# Patient Record
Sex: FEMALE | Race: WHITE | Employment: UNEMPLOYED | URBAN - METROPOLITAN AREA
[De-identification: names, ages, dates, MRNs, and addresses within clinical notes are randomized per-mention and may not be internally consistent; named-entity substitution may affect disease eponyms.]

---

## 2017-03-13 ENCOUNTER — OFFICE VISIT (OUTPATIENT)
Dept: PRIMARY CARE CLINIC | Age: 67
End: 2017-03-13
Payer: MEDICARE

## 2017-03-13 VITALS
HEIGHT: 63 IN | DIASTOLIC BLOOD PRESSURE: 76 MMHG | RESPIRATION RATE: 16 BRPM | WEIGHT: 185 LBS | SYSTOLIC BLOOD PRESSURE: 124 MMHG | BODY MASS INDEX: 32.78 KG/M2 | HEART RATE: 76 BPM

## 2017-03-13 DIAGNOSIS — I10 ESSENTIAL HYPERTENSION: ICD-10-CM

## 2017-03-13 DIAGNOSIS — K21.9 GASTROESOPHAGEAL REFLUX DISEASE WITHOUT ESOPHAGITIS: ICD-10-CM

## 2017-03-13 DIAGNOSIS — Z00.00 ENCOUNTER FOR GENERAL ADULT MEDICAL EXAMINATION W/O ABNORMAL FINDINGS: Primary | ICD-10-CM

## 2017-03-13 DIAGNOSIS — Z51.81 SUBTHERAPEUTIC ANTICOAGULATION: ICD-10-CM

## 2017-03-13 DIAGNOSIS — F41.9 ANXIETY: ICD-10-CM

## 2017-03-13 DIAGNOSIS — G25.81 RESTLESS LEG SYNDROME: ICD-10-CM

## 2017-03-13 DIAGNOSIS — E78.2 MIXED HYPERLIPIDEMIA: ICD-10-CM

## 2017-03-13 DIAGNOSIS — Z79.01 SUBTHERAPEUTIC ANTICOAGULATION: ICD-10-CM

## 2017-03-13 DIAGNOSIS — I26.99 OTHER PULMONARY EMBOLISM WITHOUT ACUTE COR PULMONALE, UNSPECIFIED CHRONICITY (HCC): ICD-10-CM

## 2017-03-13 LAB
INR BLD: 2.7
PROTIME: 31 SECONDS

## 2017-03-13 PROCEDURE — 99215 OFFICE O/P EST HI 40 MIN: CPT | Performed by: NURSE PRACTITIONER

## 2017-03-13 RX ORDER — WARFARIN SODIUM 1 MG/1
TABLET ORAL
COMMUNITY
Start: 2017-01-24 | End: 2017-06-20 | Stop reason: ALTCHOICE

## 2017-03-13 RX ORDER — LISINOPRIL AND HYDROCHLOROTHIAZIDE 25; 20 MG/1; MG/1
1 TABLET ORAL DAILY
COMMUNITY
Start: 2017-02-22 | End: 2017-06-20 | Stop reason: SDUPTHER

## 2017-03-13 RX ORDER — OMEPRAZOLE 20 MG/1
20 CAPSULE, DELAYED RELEASE ORAL DAILY
COMMUNITY
Start: 2016-12-26 | End: 2017-04-11 | Stop reason: SDUPTHER

## 2017-03-13 RX ORDER — LORAZEPAM 0.5 MG/1
0.5 TABLET ORAL EVERY 6 HOURS PRN
COMMUNITY
Start: 2017-02-22 | End: 2017-03-29 | Stop reason: SDUPTHER

## 2017-03-13 RX ORDER — WARFARIN SODIUM 3 MG/1
TABLET ORAL
COMMUNITY
Start: 2017-01-22 | End: 2017-07-17 | Stop reason: SDUPTHER

## 2017-03-13 RX ORDER — ACETAMINOPHEN 500 MG
1000 TABLET ORAL EVERY 6 HOURS PRN
Status: ON HOLD | COMMUNITY
End: 2018-02-19 | Stop reason: HOSPADM

## 2017-03-13 RX ORDER — ATORVASTATIN CALCIUM 20 MG/1
20 TABLET, FILM COATED ORAL DAILY
COMMUNITY
Start: 2017-02-27 | End: 2017-04-28 | Stop reason: SDUPTHER

## 2017-03-13 RX ORDER — ESOMEPRAZOLE MAGNESIUM 20 MG/1
20 FOR SUSPENSION ORAL 2 TIMES DAILY
COMMUNITY
End: 2017-06-20 | Stop reason: ALTCHOICE

## 2017-03-13 RX ORDER — DIPHENHYDRAMINE HCL 25 MG
25 TABLET ORAL EVERY 6 HOURS PRN
COMMUNITY
End: 2018-03-12 | Stop reason: ALTCHOICE

## 2017-03-13 ASSESSMENT — ENCOUNTER SYMPTOMS
BLURRED VISION: 0
COUGH: 0
ABDOMINAL PAIN: 0
SHORTNESS OF BREATH: 0
BACK PAIN: 0
ORTHOPNEA: 0

## 2017-03-13 ASSESSMENT — PATIENT HEALTH QUESTIONNAIRE - PHQ9
1. LITTLE INTEREST OR PLEASURE IN DOING THINGS: 0
SUM OF ALL RESPONSES TO PHQ9 QUESTIONS 1 & 2: 0
2. FEELING DOWN, DEPRESSED OR HOPELESS: 0
SUM OF ALL RESPONSES TO PHQ QUESTIONS 1-9: 0

## 2017-03-15 ENCOUNTER — TELEPHONE (OUTPATIENT)
Dept: PRIMARY CARE CLINIC | Age: 67
End: 2017-03-15

## 2017-03-15 ENCOUNTER — ANTI-COAG VISIT (OUTPATIENT)
Dept: PRIMARY CARE CLINIC | Age: 67
End: 2017-03-15

## 2017-03-15 DIAGNOSIS — I26.99 OTHER PULMONARY EMBOLISM WITHOUT ACUTE COR PULMONALE, UNSPECIFIED CHRONICITY (HCC): Primary | ICD-10-CM

## 2017-03-20 DIAGNOSIS — I26.99 OTHER PULMONARY EMBOLISM WITHOUT ACUTE COR PULMONALE, UNSPECIFIED CHRONICITY (HCC): Primary | ICD-10-CM

## 2017-03-21 ENCOUNTER — ANTI-COAG VISIT (OUTPATIENT)
Dept: PRIMARY CARE CLINIC | Age: 67
End: 2017-03-21

## 2017-03-29 RX ORDER — LORAZEPAM 0.5 MG/1
0.5 TABLET ORAL EVERY 12 HOURS PRN
Qty: 60 TABLET | Refills: 0 | Status: SHIPPED | OUTPATIENT
Start: 2017-03-29 | End: 2017-04-26 | Stop reason: SDUPTHER

## 2017-04-03 ENCOUNTER — TELEPHONE (OUTPATIENT)
Dept: PRIMARY CARE CLINIC | Age: 67
End: 2017-04-03

## 2017-04-03 LAB
INR BLD: 3.2
INR BLD: 3.2
PROTIME: 35 SECONDS

## 2017-04-10 ENCOUNTER — TELEPHONE (OUTPATIENT)
Dept: PRIMARY CARE CLINIC | Age: 67
End: 2017-04-10

## 2017-04-10 LAB
INR BLD: 3.4
PROTIME: 37 SECONDS

## 2017-04-11 ENCOUNTER — ANTI-COAG VISIT (OUTPATIENT)
Dept: PRIMARY CARE CLINIC | Age: 67
End: 2017-04-11

## 2017-04-12 RX ORDER — OMEPRAZOLE 20 MG/1
20 CAPSULE, DELAYED RELEASE ORAL DAILY
Qty: 90 CAPSULE | Refills: 3 | Status: SHIPPED | OUTPATIENT
Start: 2017-04-12 | End: 2018-03-12 | Stop reason: ALTCHOICE

## 2017-04-24 ENCOUNTER — TELEPHONE (OUTPATIENT)
Dept: PRIMARY CARE CLINIC | Age: 67
End: 2017-04-24

## 2017-04-24 LAB
INR BLD: 2.9
PROTIME: 31.8 SECONDS

## 2017-04-26 RX ORDER — LORAZEPAM 0.5 MG/1
TABLET ORAL
Qty: 60 TABLET | Refills: 2 | Status: SHIPPED | OUTPATIENT
Start: 2017-04-26 | End: 2017-07-21 | Stop reason: SDUPTHER

## 2017-04-27 ENCOUNTER — ANTI-COAG VISIT (OUTPATIENT)
Dept: PRIMARY CARE CLINIC | Age: 67
End: 2017-04-27

## 2017-05-01 RX ORDER — ATORVASTATIN CALCIUM 20 MG/1
20 TABLET, FILM COATED ORAL DAILY
Qty: 90 TABLET | Refills: 1 | Status: SHIPPED | OUTPATIENT
Start: 2017-05-01 | End: 2017-08-07 | Stop reason: SDUPTHER

## 2017-05-08 ENCOUNTER — ANTI-COAG VISIT (OUTPATIENT)
Dept: PRIMARY CARE CLINIC | Age: 67
End: 2017-05-08

## 2017-05-08 LAB
INR BLD: 3.6
PROTIME: 39 SECONDS

## 2017-05-15 LAB — INR BLD: 2.9

## 2017-05-16 ENCOUNTER — TELEPHONE (OUTPATIENT)
Dept: PRIMARY CARE CLINIC | Age: 67
End: 2017-05-16

## 2017-05-16 ENCOUNTER — ANTI-COAG VISIT (OUTPATIENT)
Dept: PRIMARY CARE CLINIC | Age: 67
End: 2017-05-16

## 2017-05-16 DIAGNOSIS — I26.99 OTHER PULMONARY EMBOLISM WITHOUT ACUTE COR PULMONALE, UNSPECIFIED CHRONICITY (HCC): ICD-10-CM

## 2017-05-30 LAB
INR BLD: 2.9
PROTIME: 32 SECONDS

## 2017-05-31 ENCOUNTER — ANTI-COAG VISIT (OUTPATIENT)
Dept: PRIMARY CARE CLINIC | Age: 67
End: 2017-05-31

## 2017-06-01 ENCOUNTER — ANTI-COAG VISIT (OUTPATIENT)
Dept: PRIMARY CARE CLINIC | Age: 67
End: 2017-06-01

## 2017-06-20 ENCOUNTER — HOSPITAL ENCOUNTER (OUTPATIENT)
Age: 67
Setting detail: SPECIMEN
Discharge: HOME | End: 2017-06-20
Payer: MEDICARE

## 2017-06-20 ENCOUNTER — TELEPHONE (OUTPATIENT)
Dept: PRIMARY CARE CLINIC | Age: 67
End: 2017-06-20

## 2017-06-20 ENCOUNTER — OFFICE VISIT (OUTPATIENT)
Dept: PRIMARY CARE CLINIC | Age: 67
End: 2017-06-20
Payer: MEDICARE

## 2017-06-20 VITALS
WEIGHT: 173 LBS | BODY MASS INDEX: 30.65 KG/M2 | HEART RATE: 100 BPM | HEIGHT: 63 IN | DIASTOLIC BLOOD PRESSURE: 78 MMHG | SYSTOLIC BLOOD PRESSURE: 122 MMHG | RESPIRATION RATE: 18 BRPM

## 2017-06-20 DIAGNOSIS — I10 ESSENTIAL HYPERTENSION: ICD-10-CM

## 2017-06-20 DIAGNOSIS — Z79.01 SUBTHERAPEUTIC ANTICOAGULATION: ICD-10-CM

## 2017-06-20 DIAGNOSIS — E11.65 TYPE 2 DIABETES MELLITUS WITH HYPERGLYCEMIA, WITHOUT LONG-TERM CURRENT USE OF INSULIN (HCC): ICD-10-CM

## 2017-06-20 DIAGNOSIS — R73.9 HYPERGLYCEMIA: ICD-10-CM

## 2017-06-20 DIAGNOSIS — E11.22 CKD STAGE 3 DUE TO TYPE 2 DIABETES MELLITUS (HCC): Primary | ICD-10-CM

## 2017-06-20 DIAGNOSIS — N18.30 CKD STAGE 3 DUE TO TYPE 2 DIABETES MELLITUS (HCC): Primary | ICD-10-CM

## 2017-06-20 DIAGNOSIS — Z51.81 SUBTHERAPEUTIC ANTICOAGULATION: ICD-10-CM

## 2017-06-20 DIAGNOSIS — R30.0 DYSURIA: Primary | ICD-10-CM

## 2017-06-20 LAB
-: ABNORMAL
ALBUMIN SERPL-MCNC: 4.8 G/DL
ALP BLD-CCNC: 75 U/L
ALT SERPL-CCNC: 35 U/L
AMORPHOUS: ABNORMAL
AST SERPL-CCNC: 23 U/L
BACTERIA: ABNORMAL
BASOPHILS ABSOLUTE: NORMAL /ΜL
BASOPHILS RELATIVE PERCENT: NORMAL %
BILIRUB SERPL-MCNC: 0.6 MG/DL (ref 0.1–1.4)
BILIRUBIN URINE: NEGATIVE
BILIRUBIN, POC: ABNORMAL
BLOOD URINE, POC: ABNORMAL
BUN BLDV-MCNC: 30 MG/DL
CALCIUM SERPL-MCNC: 10.4 MG/DL
CASTS UA: ABNORMAL /LPF (ref 0–2)
CHLORIDE BLD-SCNC: 93 MMOL/L
CHOLESTEROL, TOTAL: 264 MG/DL
CHOLESTEROL/HDL RATIO: 4.6
CLARITY, POC: ABNORMAL
CO2: 26 MMOL/L
COLOR, POC: YELLOW
COLOR: ABNORMAL
COMMENT UA: ABNORMAL
CREAT SERPL-MCNC: 1.45 MG/DL
CRYSTALS, UA: ABNORMAL /HPF
EOSINOPHILS ABSOLUTE: NORMAL /ΜL
EOSINOPHILS RELATIVE PERCENT: NORMAL %
EPITHELIAL CELLS UA: ABNORMAL /HPF (ref 0–5)
GFR CALCULATED: NORMAL
GLUCOSE BLD-MCNC: 572 MG/DL
GLUCOSE URINE, POC: ABNORMAL
GLUCOSE URINE: ABNORMAL
HBA1C MFR BLD: 14 %
HCT VFR BLD CALC: 45.8 % (ref 36–46)
HDLC SERPL-MCNC: 57 MG/DL (ref 35–70)
HEMOGLOBIN: 15.3 G/DL (ref 12–16)
INR BLD: 3.6
KETONES, POC: ABNORMAL
KETONES, URINE: NEGATIVE
LDL CHOLESTEROL CALCULATED: NORMAL MG/DL (ref 0–160)
LEUKOCYTE EST, POC: ABNORMAL
LEUKOCYTE ESTERASE, URINE: ABNORMAL
LYMPHOCYTES ABSOLUTE: NORMAL /ΜL
LYMPHOCYTES RELATIVE PERCENT: NORMAL %
MCH RBC QN AUTO: NORMAL PG
MCHC RBC AUTO-ENTMCNC: NORMAL G/DL
MCV RBC AUTO: NORMAL FL
MONOCYTES ABSOLUTE: NORMAL /ΜL
MONOCYTES RELATIVE PERCENT: NORMAL %
MUCUS: ABNORMAL
NEUTROPHILS ABSOLUTE: NORMAL /ΜL
NEUTROPHILS RELATIVE PERCENT: NORMAL %
NITRITE, POC: ABNORMAL
NITRITE, URINE: NEGATIVE
OTHER OBSERVATIONS UA: ABNORMAL
PDW BLD-RTO: NORMAL %
PH UA: 6.5 (ref 5–8)
PH, POC: 6
PLATELET # BLD: 297 K/ΜL
PMV BLD AUTO: NORMAL FL
POTASSIUM SERPL-SCNC: 4.5 MMOL/L
PROTEIN UA: NEGATIVE
PROTEIN, POC: ABNORMAL
PROTIME: 39.1 SECONDS
PROTIME: NORMAL SECONDS
RBC # BLD: 5.01 10^6/ΜL
RBC UA: ABNORMAL /HPF (ref 0–2)
RENAL EPITHELIAL, UA: ABNORMAL /HPF
SODIUM BLD-SCNC: 135 MMOL/L
SPECIFIC GRAVITY UA: 1.01 (ref 1–1.03)
SPECIFIC GRAVITY, POC: 1
TOTAL PROTEIN: 8.2
TRICHOMONAS: ABNORMAL
TRIGL SERPL-MCNC: 562 MG/DL
TURBIDITY: CLEAR
URINE HGB: ABNORMAL
UROBILINOGEN, POC: ABNORMAL
UROBILINOGEN, URINE: NORMAL
VLDLC SERPL CALC-MCNC: 112 MG/DL
WBC # BLD: 12.3 10^3/ML
WBC UA: ABNORMAL /HPF (ref 0–5)
YEAST: ABNORMAL

## 2017-06-20 PROCEDURE — G8400 PT W/DXA NO RESULTS DOC: HCPCS | Performed by: INTERNAL MEDICINE

## 2017-06-20 PROCEDURE — 1090F PRES/ABSN URINE INCON ASSESS: CPT | Performed by: INTERNAL MEDICINE

## 2017-06-20 PROCEDURE — 4040F PNEUMOC VAC/ADMIN/RCVD: CPT | Performed by: INTERNAL MEDICINE

## 2017-06-20 PROCEDURE — 99214 OFFICE O/P EST MOD 30 MIN: CPT | Performed by: INTERNAL MEDICINE

## 2017-06-20 PROCEDURE — 1123F ACP DISCUSS/DSCN MKR DOCD: CPT | Performed by: INTERNAL MEDICINE

## 2017-06-20 PROCEDURE — 3017F COLORECTAL CA SCREEN DOC REV: CPT | Performed by: INTERNAL MEDICINE

## 2017-06-20 PROCEDURE — 3046F HEMOGLOBIN A1C LEVEL >9.0%: CPT | Performed by: INTERNAL MEDICINE

## 2017-06-20 PROCEDURE — 83036 HEMOGLOBIN GLYCOSYLATED A1C: CPT | Performed by: INTERNAL MEDICINE

## 2017-06-20 PROCEDURE — G8427 DOCREV CUR MEDS BY ELIG CLIN: HCPCS | Performed by: INTERNAL MEDICINE

## 2017-06-20 PROCEDURE — 1036F TOBACCO NON-USER: CPT | Performed by: INTERNAL MEDICINE

## 2017-06-20 PROCEDURE — 81002 URINALYSIS NONAUTO W/O SCOPE: CPT | Performed by: INTERNAL MEDICINE

## 2017-06-20 PROCEDURE — 3014F SCREEN MAMMO DOC REV: CPT | Performed by: INTERNAL MEDICINE

## 2017-06-20 PROCEDURE — G8417 CALC BMI ABV UP PARAM F/U: HCPCS | Performed by: INTERNAL MEDICINE

## 2017-06-20 RX ORDER — GLIMEPIRIDE 4 MG/1
4 TABLET ORAL 2 TIMES DAILY
Qty: 60 TABLET | Refills: 3 | Status: SHIPPED | OUTPATIENT
Start: 2017-06-20 | End: 2017-08-14 | Stop reason: SDUPTHER

## 2017-06-20 RX ORDER — LISINOPRIL AND HYDROCHLOROTHIAZIDE 25; 20 MG/1; MG/1
1 TABLET ORAL DAILY
Qty: 30 TABLET | Refills: 5 | Status: SHIPPED | OUTPATIENT
Start: 2017-06-20 | End: 2017-08-07 | Stop reason: SDUPTHER

## 2017-06-20 RX ORDER — CIPROFLOXACIN 500 MG/1
500 TABLET, FILM COATED ORAL 2 TIMES DAILY
Qty: 10 TABLET | Refills: 0 | Status: SHIPPED | OUTPATIENT
Start: 2017-06-20 | End: 2017-06-20 | Stop reason: CLARIF

## 2017-06-20 ASSESSMENT — ENCOUNTER SYMPTOMS
EYE REDNESS: 0
VOMITING: 0
BACK PAIN: 0
ABDOMINAL PAIN: 0
SORE THROAT: 0
EYE DISCHARGE: 0
SHORTNESS OF BREATH: 0
TROUBLE SWALLOWING: 0
COUGH: 0
NAUSEA: 1

## 2017-06-21 ENCOUNTER — ANTI-COAG VISIT (OUTPATIENT)
Dept: PRIMARY CARE CLINIC | Age: 67
End: 2017-06-21

## 2017-06-21 ENCOUNTER — TELEPHONE (OUTPATIENT)
Dept: PRIMARY CARE CLINIC | Age: 67
End: 2017-06-21

## 2017-06-21 DIAGNOSIS — N18.30 TYPE 2 DIABETES MELLITUS WITH STAGE 3 CHRONIC KIDNEY DISEASE, WITHOUT LONG-TERM CURRENT USE OF INSULIN (HCC): Primary | ICD-10-CM

## 2017-06-21 DIAGNOSIS — E11.22 TYPE 2 DIABETES MELLITUS WITH STAGE 3 CHRONIC KIDNEY DISEASE, WITHOUT LONG-TERM CURRENT USE OF INSULIN (HCC): Primary | ICD-10-CM

## 2017-06-21 DIAGNOSIS — E11.65 UNCONTROLLED TYPE 2 DIABETES MELLITUS WITH HYPERGLYCEMIA, WITHOUT LONG-TERM CURRENT USE OF INSULIN (HCC): Primary | ICD-10-CM

## 2017-06-21 LAB
CULTURE: NORMAL
CULTURE: NORMAL
Lab: NORMAL
SPECIMEN DESCRIPTION: NORMAL
STATUS: NORMAL

## 2017-06-21 RX ORDER — SYRING-NEEDL,DISP,INSUL,0.3 ML 30 GX5/16"
1 SYRINGE, EMPTY DISPOSABLE MISCELLANEOUS 2 TIMES DAILY
Qty: 1 DEVICE | Refills: 0 | OUTPATIENT
Start: 2017-06-21

## 2017-06-21 RX ORDER — LANCETS 30 GAUGE
EACH MISCELLANEOUS
Qty: 200 EACH | Refills: 3 | OUTPATIENT
Start: 2017-06-21

## 2017-06-21 RX ORDER — GLUCOSAMINE HCL/CHONDROITIN SU 500-400 MG
CAPSULE ORAL
Qty: 100 STRIP | Refills: 11 | Status: SHIPPED | OUTPATIENT
Start: 2017-06-21 | End: 2018-07-21 | Stop reason: SDUPTHER

## 2017-06-21 RX ORDER — GLUCOSAMINE HCL/CHONDROITIN SU 500-400 MG
CAPSULE ORAL
Qty: 200 STRIP | Refills: 3 | OUTPATIENT
Start: 2017-06-21

## 2017-06-21 RX ORDER — BLOOD PRESSURE TEST KIT
KIT MISCELLANEOUS
Qty: 100 EACH | Refills: 3 | Status: SHIPPED | OUTPATIENT
Start: 2017-06-21

## 2017-06-21 RX ORDER — LANCETS
EACH MISCELLANEOUS
Qty: 100 EACH | Refills: 3 | Status: SHIPPED | OUTPATIENT
Start: 2017-06-21 | End: 2017-06-22 | Stop reason: CLARIF

## 2017-06-22 RX ORDER — LANCETS
EACH MISCELLANEOUS
Qty: 200 EACH | Refills: 3 | Status: SHIPPED | OUTPATIENT
Start: 2017-06-22 | End: 2018-07-21 | Stop reason: SDUPTHER

## 2017-06-22 RX ORDER — LANCETS
EACH MISCELLANEOUS 2 TIMES DAILY
COMMUNITY
End: 2017-06-22 | Stop reason: SDUPTHER

## 2017-06-26 ENCOUNTER — TELEPHONE (OUTPATIENT)
Dept: PRIMARY CARE CLINIC | Age: 67
End: 2017-06-26

## 2017-06-26 DIAGNOSIS — E11.22 CKD STAGE 3 DUE TO TYPE 2 DIABETES MELLITUS (HCC): ICD-10-CM

## 2017-06-26 DIAGNOSIS — N18.30 CKD STAGE 3 DUE TO TYPE 2 DIABETES MELLITUS (HCC): ICD-10-CM

## 2017-06-26 LAB — INR BLD: 3.2

## 2017-06-27 ENCOUNTER — TELEPHONE (OUTPATIENT)
Dept: PRIMARY CARE CLINIC | Age: 67
End: 2017-06-27

## 2017-06-27 ENCOUNTER — ANTI-COAG VISIT (OUTPATIENT)
Dept: PRIMARY CARE CLINIC | Age: 67
End: 2017-06-27

## 2017-06-27 DIAGNOSIS — E11.21 TYPE 2 DIABETES MELLITUS WITH DIABETIC NEPHROPATHY, UNSPECIFIED LONG TERM INSULIN USE STATUS: Primary | ICD-10-CM

## 2017-07-03 ENCOUNTER — ANTI-COAG VISIT (OUTPATIENT)
Dept: PRIMARY CARE CLINIC | Age: 67
End: 2017-07-03

## 2017-07-03 LAB
INR BLD: 3.4
INR BLD: 3.4
PROTIME: 37.4 SECONDS

## 2017-07-10 LAB — INR BLD: 2

## 2017-07-11 ENCOUNTER — ANTI-COAG VISIT (OUTPATIENT)
Dept: PRIMARY CARE CLINIC | Age: 67
End: 2017-07-11

## 2017-07-18 ENCOUNTER — ANTI-COAG VISIT (OUTPATIENT)
Dept: PRIMARY CARE CLINIC | Age: 67
End: 2017-07-18

## 2017-07-18 LAB — INR BLD: 1.7

## 2017-07-18 RX ORDER — WARFARIN SODIUM 3 MG/1
3 TABLET ORAL DAILY
Qty: 30 TABLET | Refills: 5 | Status: SHIPPED | OUTPATIENT
Start: 2017-07-18 | End: 2017-08-14 | Stop reason: SDUPTHER

## 2017-07-21 RX ORDER — LORAZEPAM 0.5 MG/1
TABLET ORAL
Qty: 60 TABLET | Refills: 0 | Status: SHIPPED | OUTPATIENT
Start: 2017-07-21 | End: 2017-08-21 | Stop reason: SDUPTHER

## 2017-07-25 LAB — INR BLD: 2.6

## 2017-07-27 ENCOUNTER — ANTI-COAG VISIT (OUTPATIENT)
Dept: PRIMARY CARE CLINIC | Age: 67
End: 2017-07-27

## 2017-08-07 ENCOUNTER — OFFICE VISIT (OUTPATIENT)
Dept: PRIMARY CARE CLINIC | Age: 67
End: 2017-08-07
Payer: MEDICARE

## 2017-08-07 VITALS
BODY MASS INDEX: 31.71 KG/M2 | SYSTOLIC BLOOD PRESSURE: 100 MMHG | DIASTOLIC BLOOD PRESSURE: 70 MMHG | WEIGHT: 179 LBS | HEIGHT: 63 IN | RESPIRATION RATE: 16 BRPM

## 2017-08-07 DIAGNOSIS — Z12.39 SCREENING FOR BREAST CANCER: ICD-10-CM

## 2017-08-07 DIAGNOSIS — R31.9 URINARY TRACT INFECTION WITH HEMATURIA, SITE UNSPECIFIED: ICD-10-CM

## 2017-08-07 DIAGNOSIS — Z12.31 ENCOUNTER FOR SCREENING MAMMOGRAM FOR MALIGNANT NEOPLASM OF BREAST: ICD-10-CM

## 2017-08-07 DIAGNOSIS — E11.65 TYPE 2 DIABETES MELLITUS WITH HYPERGLYCEMIA, WITHOUT LONG-TERM CURRENT USE OF INSULIN (HCC): Primary | ICD-10-CM

## 2017-08-07 DIAGNOSIS — N39.0 URINARY TRACT INFECTION WITH HEMATURIA, SITE UNSPECIFIED: ICD-10-CM

## 2017-08-07 LAB
BILIRUBIN, POC: ABNORMAL
BLOOD URINE, POC: ABNORMAL
CLARITY, POC: CLEAR
COLOR, POC: YELLOW
GLUCOSE URINE, POC: ABNORMAL
KETONES, POC: ABNORMAL
LEUKOCYTE EST, POC: ABNORMAL
NITRITE, POC: ABNORMAL
PH, POC: 6
PROTEIN, POC: ABNORMAL
SPECIFIC GRAVITY, POC: 1.02
UROBILINOGEN, POC: ABNORMAL

## 2017-08-07 PROCEDURE — G8427 DOCREV CUR MEDS BY ELIG CLIN: HCPCS | Performed by: NURSE PRACTITIONER

## 2017-08-07 PROCEDURE — 1036F TOBACCO NON-USER: CPT | Performed by: NURSE PRACTITIONER

## 2017-08-07 PROCEDURE — 81003 URINALYSIS AUTO W/O SCOPE: CPT | Performed by: NURSE PRACTITIONER

## 2017-08-07 PROCEDURE — 3017F COLORECTAL CA SCREEN DOC REV: CPT | Performed by: NURSE PRACTITIONER

## 2017-08-07 PROCEDURE — 3046F HEMOGLOBIN A1C LEVEL >9.0%: CPT | Performed by: NURSE PRACTITIONER

## 2017-08-07 PROCEDURE — G8417 CALC BMI ABV UP PARAM F/U: HCPCS | Performed by: NURSE PRACTITIONER

## 2017-08-07 PROCEDURE — 99214 OFFICE O/P EST MOD 30 MIN: CPT | Performed by: NURSE PRACTITIONER

## 2017-08-07 PROCEDURE — 1090F PRES/ABSN URINE INCON ASSESS: CPT | Performed by: NURSE PRACTITIONER

## 2017-08-07 PROCEDURE — G8400 PT W/DXA NO RESULTS DOC: HCPCS | Performed by: NURSE PRACTITIONER

## 2017-08-07 PROCEDURE — 1123F ACP DISCUSS/DSCN MKR DOCD: CPT | Performed by: NURSE PRACTITIONER

## 2017-08-07 PROCEDURE — 83036 HEMOGLOBIN GLYCOSYLATED A1C: CPT | Performed by: NURSE PRACTITIONER

## 2017-08-07 PROCEDURE — 4040F PNEUMOC VAC/ADMIN/RCVD: CPT | Performed by: NURSE PRACTITIONER

## 2017-08-07 PROCEDURE — 3014F SCREEN MAMMO DOC REV: CPT | Performed by: NURSE PRACTITIONER

## 2017-08-07 RX ORDER — ATORVASTATIN CALCIUM 20 MG/1
20 TABLET, FILM COATED ORAL DAILY
Qty: 90 TABLET | Refills: 3 | Status: SHIPPED | OUTPATIENT
Start: 2017-08-07 | End: 2018-08-14 | Stop reason: SDUPTHER

## 2017-08-07 RX ORDER — LISINOPRIL AND HYDROCHLOROTHIAZIDE 25; 20 MG/1; MG/1
1 TABLET ORAL DAILY
Qty: 90 TABLET | Refills: 3 | Status: ON HOLD | OUTPATIENT
Start: 2017-08-07 | End: 2018-03-02 | Stop reason: HOSPADM

## 2017-08-07 RX ORDER — NITROFURANTOIN 25; 75 MG/1; MG/1
100 CAPSULE ORAL 2 TIMES DAILY
Qty: 20 CAPSULE | Refills: 0 | Status: SHIPPED | OUTPATIENT
Start: 2017-08-07 | End: 2017-08-17

## 2017-08-07 ASSESSMENT — ENCOUNTER SYMPTOMS
VISUAL CHANGE: 1
ABDOMINAL PAIN: 0
BLURRED VISION: 1
COUGH: 0
BACK PAIN: 0
SHORTNESS OF BREATH: 0

## 2017-08-09 ENCOUNTER — TELEPHONE (OUTPATIENT)
Dept: PRIMARY CARE CLINIC | Age: 67
End: 2017-08-09

## 2017-08-09 DIAGNOSIS — E11.65 TYPE 2 DIABETES MELLITUS WITH HYPERGLYCEMIA, WITHOUT LONG-TERM CURRENT USE OF INSULIN (HCC): ICD-10-CM

## 2017-08-14 DIAGNOSIS — E11.65 TYPE 2 DIABETES MELLITUS WITH HYPERGLYCEMIA, WITHOUT LONG-TERM CURRENT USE OF INSULIN (HCC): ICD-10-CM

## 2017-08-14 RX ORDER — GLIMEPIRIDE 4 MG/1
4 TABLET ORAL 2 TIMES DAILY
Qty: 180 TABLET | Refills: 1 | Status: SHIPPED | OUTPATIENT
Start: 2017-08-14 | End: 2018-02-12 | Stop reason: SDUPTHER

## 2017-08-14 RX ORDER — WARFARIN SODIUM 3 MG/1
3 TABLET ORAL DAILY
Qty: 90 TABLET | Refills: 1 | Status: ON HOLD | OUTPATIENT
Start: 2017-08-14 | End: 2018-02-19

## 2017-08-21 ENCOUNTER — ANTI-COAG VISIT (OUTPATIENT)
Dept: PRIMARY CARE CLINIC | Age: 67
End: 2017-08-21

## 2017-08-21 LAB — INR BLD: 2

## 2017-08-21 RX ORDER — LORAZEPAM 0.5 MG/1
TABLET ORAL
Qty: 60 TABLET | Refills: 0 | Status: SHIPPED | OUTPATIENT
Start: 2017-08-21 | End: 2017-09-18 | Stop reason: SDUPTHER

## 2017-08-24 ENCOUNTER — TELEPHONE (OUTPATIENT)
Dept: PRIMARY CARE CLINIC | Age: 67
End: 2017-08-24

## 2017-08-29 ENCOUNTER — HOSPITAL ENCOUNTER (OUTPATIENT)
Dept: DIABETES SERVICES | Age: 67
Setting detail: THERAPIES SERIES
Discharge: HOME | End: 2017-08-29
Payer: MEDICARE

## 2017-08-29 PROCEDURE — G0108 DIAB MANAGE TRN  PER INDIV: HCPCS

## 2017-08-30 ENCOUNTER — TELEPHONE (OUTPATIENT)
Dept: PRIMARY CARE CLINIC | Age: 67
End: 2017-08-30

## 2017-08-30 RX ORDER — GUAIFENESIN AND CODEINE PHOSPHATE 100; 10 MG/5ML; MG/5ML
5 SOLUTION ORAL EVERY 4 HOURS PRN
Qty: 180 ML | Refills: 1 | Status: SHIPPED | OUTPATIENT
Start: 2017-08-30 | End: 2017-09-06

## 2017-09-11 ENCOUNTER — OFFICE VISIT (OUTPATIENT)
Dept: PODIATRY | Age: 67
End: 2017-09-11
Payer: MEDICARE

## 2017-09-11 VITALS
WEIGHT: 184 LBS | BODY MASS INDEX: 32.6 KG/M2 | DIASTOLIC BLOOD PRESSURE: 74 MMHG | HEART RATE: 96 BPM | HEIGHT: 63 IN | SYSTOLIC BLOOD PRESSURE: 120 MMHG

## 2017-09-11 DIAGNOSIS — B35.1 DERMATOPHYTOSIS OF NAIL: ICD-10-CM

## 2017-09-11 DIAGNOSIS — I73.9 PERIPHERAL VASCULAR DISEASE (HCC): ICD-10-CM

## 2017-09-11 DIAGNOSIS — E11.51 TYPE II DIABETES MELLITUS WITH PERIPHERAL CIRCULATORY DISORDER (HCC): ICD-10-CM

## 2017-09-11 DIAGNOSIS — E11.42 DIABETIC POLYNEUROPATHY ASSOCIATED WITH TYPE 2 DIABETES MELLITUS (HCC): Primary | ICD-10-CM

## 2017-09-11 PROCEDURE — G8400 PT W/DXA NO RESULTS DOC: HCPCS | Performed by: PODIATRIST

## 2017-09-11 PROCEDURE — 4040F PNEUMOC VAC/ADMIN/RCVD: CPT | Performed by: PODIATRIST

## 2017-09-11 PROCEDURE — 11721 DEBRIDE NAIL 6 OR MORE: CPT | Performed by: PODIATRIST

## 2017-09-11 PROCEDURE — 3046F HEMOGLOBIN A1C LEVEL >9.0%: CPT | Performed by: PODIATRIST

## 2017-09-11 PROCEDURE — 1090F PRES/ABSN URINE INCON ASSESS: CPT | Performed by: PODIATRIST

## 2017-09-11 PROCEDURE — G8417 CALC BMI ABV UP PARAM F/U: HCPCS | Performed by: PODIATRIST

## 2017-09-11 PROCEDURE — 99203 OFFICE O/P NEW LOW 30 MIN: CPT | Performed by: PODIATRIST

## 2017-09-11 PROCEDURE — 1123F ACP DISCUSS/DSCN MKR DOCD: CPT | Performed by: PODIATRIST

## 2017-09-11 PROCEDURE — 3017F COLORECTAL CA SCREEN DOC REV: CPT | Performed by: PODIATRIST

## 2017-09-11 PROCEDURE — G8427 DOCREV CUR MEDS BY ELIG CLIN: HCPCS | Performed by: PODIATRIST

## 2017-09-11 PROCEDURE — 1036F TOBACCO NON-USER: CPT | Performed by: PODIATRIST

## 2017-09-11 PROCEDURE — 3014F SCREEN MAMMO DOC REV: CPT | Performed by: PODIATRIST

## 2017-09-18 ENCOUNTER — PROCEDURE VISIT (OUTPATIENT)
Dept: PODIATRY | Age: 67
End: 2017-09-18

## 2017-09-18 ENCOUNTER — ANTI-COAG VISIT (OUTPATIENT)
Dept: PRIMARY CARE CLINIC | Age: 67
End: 2017-09-18

## 2017-09-18 DIAGNOSIS — I73.9 PERIPHERAL VASCULAR DISEASE (HCC): Primary | ICD-10-CM

## 2017-09-18 LAB — INR BLD: 2.3

## 2017-09-18 RX ORDER — LORAZEPAM 0.5 MG/1
TABLET ORAL
Qty: 60 TABLET | Refills: 0 | Status: SHIPPED | OUTPATIENT
Start: 2017-09-18 | End: 2017-10-22 | Stop reason: SDUPTHER

## 2017-09-25 ENCOUNTER — NURSE ONLY (OUTPATIENT)
Dept: PRIMARY CARE CLINIC | Age: 67
End: 2017-09-25
Payer: MEDICARE

## 2017-09-25 ENCOUNTER — HOSPITAL ENCOUNTER (OUTPATIENT)
Dept: DIABETES SERVICES | Age: 67
Setting detail: THERAPIES SERIES
Discharge: HOME | End: 2017-09-25
Payer: MEDICARE

## 2017-09-25 DIAGNOSIS — J06.9 UPPER RESPIRATORY TRACT INFECTION, UNSPECIFIED TYPE: ICD-10-CM

## 2017-09-25 DIAGNOSIS — Z23 NEED FOR INFLUENZA VACCINATION: Primary | ICD-10-CM

## 2017-09-25 DIAGNOSIS — E11.9 TYPE 2 DIABETES MELLITUS WITHOUT COMPLICATION, WITHOUT LONG-TERM CURRENT USE OF INSULIN (HCC): Primary | ICD-10-CM

## 2017-09-25 PROCEDURE — G0008 ADMIN INFLUENZA VIRUS VAC: HCPCS | Performed by: NURSE PRACTITIONER

## 2017-09-25 PROCEDURE — 90688 IIV4 VACCINE SPLT 0.5 ML IM: CPT | Performed by: NURSE PRACTITIONER

## 2017-09-25 PROCEDURE — G0108 DIAB MANAGE TRN  PER INDIV: HCPCS

## 2017-09-25 PROCEDURE — 97802 MEDICAL NUTRITION INDIV IN: CPT

## 2017-09-25 RX ORDER — GUAIFENESIN AND CODEINE PHOSPHATE 100; 10 MG/5ML; MG/5ML
5 SOLUTION ORAL EVERY 4 HOURS PRN
Qty: 118 ML | Refills: 2 | Status: SHIPPED | OUTPATIENT
Start: 2017-09-25 | End: 2017-10-02

## 2017-09-25 RX ORDER — METFORMIN HYDROCHLORIDE 500 MG/1
500 TABLET, EXTENDED RELEASE ORAL
Qty: 60 TABLET | Refills: 11 | Status: SHIPPED | OUTPATIENT
Start: 2017-09-25 | End: 2018-02-26 | Stop reason: SDUPTHER

## 2017-09-25 RX ORDER — DOXYCYCLINE 100 MG/1
100 TABLET ORAL 2 TIMES DAILY
Qty: 20 TABLET | Refills: 0 | Status: SHIPPED | OUTPATIENT
Start: 2017-09-25 | End: 2017-10-05

## 2017-10-16 LAB — INR BLD: 2.5

## 2017-10-17 ENCOUNTER — ANTI-COAG VISIT (OUTPATIENT)
Dept: PRIMARY CARE CLINIC | Age: 67
End: 2017-10-17

## 2017-10-17 NOTE — PROGRESS NOTES
Please advise of any new dosing and when to check, please complete anti coag track. INR 2.5. No change in dose. Repeat INR in one month.  thanks

## 2017-10-23 RX ORDER — LORAZEPAM 0.5 MG/1
TABLET ORAL
Qty: 60 TABLET | Refills: 0 | Status: SHIPPED | OUTPATIENT
Start: 2017-10-23 | End: 2017-11-18 | Stop reason: SDUPTHER

## 2017-10-23 NOTE — TELEPHONE ENCOUNTER
Last seen 08/07/17  Last fill 09/18/17 # 60    Health Maintenance   Topic Date Due    Hepatitis C screen  1950    Diabetic microalbuminuria test  09/28/1967    DTaP/Tdap/Td vaccine (1 - Tdap) 09/28/1968    Breast cancer screen  09/28/1999    Colon cancer screen colonoscopy  09/28/1999    Zostavax vaccine  09/28/2009    DEXA (modify frequency per FRAX score)  09/28/2014    Pneumococcal low/med risk (1 of 2 - PCV13) 09/28/2014    Diabetic hemoglobin A1C test  09/20/2017    Lipid screen  06/20/2018    Diabetic foot exam  09/11/2018    Diabetic retinal exam  09/28/2018    Flu vaccine  Completed             (applicable per patient's age: Cancer Screenings, Depression Screening, Fall Risk Screening, Immunizations)    Hemoglobin A1C (%)   Date Value   06/20/2017 14   03/23/2014 5.5   03/22/2014 5.4     AST (U/L)   Date Value   06/20/2017 23     ALT (U/L)   Date Value   06/20/2017 35     BUN (mg/dL)   Date Value   06/20/2017 30      (goal A1C is < 7)   (goal LDL is <100) need 30-50% reduction from baseline     BP Readings from Last 3 Encounters:   09/25/17 110/80   09/11/17 120/74   08/14/17 120/78    (goal /80)      All Future Testing planned in [facility]:  Lab Frequency Next Occurrence   Hemoglobin A1C Once 09/06/2017   RAUL DIGITAL SCREEN BILATERAL Once 24/14/2366   Basic Metabolic Panel Once 52/28/1118   CBC Auto Differential Once 08/14/2017   Vitamin D 25 Hydroxy Once 11/12/2017   Electrophoresis Protein, Serum without Reflex to Immunofixation Once 08/14/2017   C3 Complement Once 08/14/2017   C4 Complement Once 08/14/2017   ANNABELLA Once 08/14/2017   Anti-Neutrophilic Cytoplasmic Antibody Once 08/14/2017   Rheumatoid Factor Once 08/14/2017   Briny Breezes/Lambda Free Lt Chains, Serum Quant Once 08/14/2017   Creatinine, Random Urine Once 08/14/2017   Urinalysis Once 08/14/2017   Protein Electrophoresis, Urine Once 08/14/2017   Protein, urine, random Once 08/14/2017   US Renal Complete Once 08/14/2017   Bladder

## 2017-11-01 ENCOUNTER — HOSPITAL ENCOUNTER (OUTPATIENT)
Dept: DIABETES SERVICES | Age: 67
Setting detail: THERAPIES SERIES
Discharge: HOME | End: 2017-11-01
Payer: MEDICARE

## 2017-11-01 PROCEDURE — G0109 DIAB MANAGE TRN IND/GROUP: HCPCS

## 2017-11-02 NOTE — PROGRESS NOTES
Diabetes Self- Management Education Program     Diet History :  Diet Questionnaire and typical meal /portion sheet completed  [x]Yes  [] NO    Goals setting:  Goal work sheet completed  [x]Yes  [] NO  (SEE  EDUCATION AND GOALS FLOWSHEET)    MEDICAL HISTORY:  Past Medical History:   Diagnosis Date    Chronic kidney disease     [facility]     Hyperlipidemia     Hypertension     [facility]     Osteoarthritis     Pulmonary embolism ([facility]     2014    Type 2 diabetes mellitus without complication ([facility]      Family History   Problem Relation Age of Onset    Kidney Disease Mother     Cancer Father     Arthritis Brother      Review of patient's allergies indicates no known allergies. Immunization History   Administered Date(s) Administered    Influenza, Quadv, 3 Years and older, IM 09/25/2017     Current Medications  Current Outpatient Prescriptions   Medication Sig Dispense Refill    LORazepam (ATIVAN) 0.5 MG tablet TAKE ONE TABLET BY MOUTH EVERY 12 HOURS AS NEEDED FOR ANXIETY 60 tablet 0    metFORMIN (GLUCOPHAGE-XR) 500 MG extended release tablet Take 1 tablet by mouth 2 times daily (before meals) Take 500 mg a day for 2 weeks, then increase the dose to twice a day. 60 tablet 11    glimepiride (AMARYL) 4 MG tablet Take 1 tablet by mouth 2 times daily 180 tablet 1    warfarin (COUMADIN) 3 MG tablet Take 1 tablet by mouth daily 90 tablet 1    lisinopril-hydrochlorothiazide (PRINZIDE;ZESTORETIC) 20-25 MG per tablet Take 1 tablet by mouth daily 90 tablet 3    atorvastatin (LIPITOR) 20 MG tablet Take 1 tablet by mouth daily 90 tablet 3    Accu-Chek Softclix Lancets MISC Use 2 TIMES DAILY. Dx: E11.22, N18.3. Type II  each 3    Glucose Blood (BLOOD GLUCOSE TEST STRIPS) STRP Test_2__times daily    Diagnosis: 250.0   Diabetes Mellitus______Non-Insulin Dependent 100 strip 11    Alcohol Swabs PADS Need to use  twice a day 100 each 3    Blood Glucose Monitoring Suppl FLAVIA Glucometer Accu check. sugar intake - stopped all pop and \"sweets\" years ago. Incorporating nutritional management into lifestyle: Describe effect of type, amount & timing of food on blood glucose; Describe basic meal planning techniques & current nutrition guideline   1- generally healthy - reads food labes - tries to apply info - also watches nehemias K 9/25/17 JW   What to eat - Food groups, When to eat - timing of meals and snacks, and How much to eat - portions control. 1200 calories/ day   CHO choices/ meal  2,1,2,1,3,1   CHO choices/  day   grams of protein /day   gram of fat /day     Correctly read food labels & demonstrate CHO counting & portion control with personalized meal plan. Identify dining out strategies, & dietary sick day guidelines. 1 9/25/17 JW      Incorporating physical activity into lifestyle:   Verbalize effect of exercise on blood glucose levels; benefits of regular exercise; safety considerations; contraindications; maintenance of activity. 1 11/2/17 RS   Limited due to her arthrisits - has has both hips replaced and past lung clot - decreased lung capacity   Using medications safely:  Identify effects of diabetes medicines on blood glucose levels; List diabetes medication taken, action & side effects;    1    Now on 2 orals - januvia and glip   Insulin / Injectable - Appropriate injection sites; proper storage; supplies needed; proper technique; safe needle disposal guidelines. 1 11/2/17 RS   Did take  lanuts for few weeks when her BG was up in July - then stopped as BG lowered back down - 8/29/17   Monitoring blood glucose, interpreting and using results:  Identify recommended & personal blood glucose targets; importance of testing; testing supplies; HgbA1C target levels; Factors affecting blood glucose;  Importance of logging blood glucose levels for pattern recognition; ketone testing; safe lancet disposal.   1 11/2/17 RS   Lab Results   Component Value Date    LABA1C 14 06/20/2017    LABA1C 5.5 03/23/2014    LABA1C 5.4 03/22/2014   per chart review - A1C at 8.6% at follow -up in office in Aug 2017      Prevention, detection & treatment of acute complications:  Identify symptoms of hyper & hypoglycemia, and prevention & treatment strategies. 1 11/2/17 RS   No lows - had very high BG in summer - aware of symptoms of high and now is checking her BG regularly    Describe sick day guidelines & indications for  physician notification. Identify short term consequences of poor control. 1       Prevention, detection & treatment of chronic complications:  Define the natural course of diabetes & describe the relationship of blood glucose levels to long term complications of diabetes. Identify preventative measures & standards of care. 1    Needs to see eye MD - stated will call   needs to see foot MD - has an appt 9/2017  Has followed with kidney MD - also fears kidney damage - her mom was on dialysis  Hx of lung clots - on coumadin  On b/p and cholesterol meds    New symptom is foot pain ? Nerve damage    Developing strategies to address psychosocial issues:  Describe feelings about living with diabetes; Describe how stress, depression & anxiety affect blood glucose; Identify coping strategies; Identify support needed & support network available. 1 11/2/17 RS   Patient has anxiety - takes medications for this - has been coping better recently and now getting out more mobility scooter. Stated in may and June was not feeling good - more depressed and was eating lots of sugar and not caring about health as much    Developing strategies to promote health/change behavior: Identify 7 self-care behaviors; Personal health risk factors; Benefits, challenges & strategies for behavioral change;    1 11/2/17 RS     Wants to go to group class for both education and social support  11/2/17 - connected well with other group member    Individualized goal selection.     Desires weight loss - goal about 10 lbs ( start wt 183 sheet       []Self-Management  Gestational  RN class -Resource materials provided today include educational self care booklet - \" Gestational Diabetes - A Healthy pregnancy and beyond\"  with SMGB and 3 small meals and 3 small snacks diet plan. SMBG sheets to fax back to MFM weekly.  [facility] guidelines for SMGB and blood glucose log sheets, Never too early to prevent diabetes handout from [facility]      []Self-Management Gestational  RD class  My Food Plan for Gestational diabetes    []Glucose Meter     []Insulin Kit     []Other      Encounter Type Date Start Time End Time Comments No Show Dates   Assessment   8/29/17RS  11 30   12 30   [x]In Person  []Telephone  With  dave    Class 1 - Understanding diabetes 11/2/17 RS  1 30   3 30  x - at PB     Class 2- Nutrition and diabetes   9/25/17 JW 1:30 3:00 With spouse    Class 3  Preventing Complications         Class 4 -  In depth Nutrition and sick day care        Class 5  3 month follow up / goal reassessment        Gestational  RN         Gestational - RD        Individual MNT         Shared Med Appt         Yearly Follow-up        Meter Instrx        Insulin Instrx      []Pen  []Vial & Syringe      DSMS Support Plan:  Follow-up plan:     [] MNT referral request / Appointment     [] Annual update referral request and appointment  after      []ADA  Where do I Begin, Living with Type 2 diabetes ADA home support program     [] Healthy U - Diabetes workshops via [address] [place] [address]       [] Starting [employer] program /  [employer] [phone]    []  Support Group / Brigid Murcia [address] 8 week diabetes education support   classes [place] Sharma [phone]      []   [email or web address] application  / scholarship program     []ADA care 4 life denita      []  Internet web sites - ADA and D- life    Post Education Referrals:      [] [address] [place] [address] information sheet and [address] [facility] , 21       [] Dental care - Dental care of [place] or corner dental clinics    [] [facility]) link  phone number - for information and referral to [facility]  [facility]inically  [address] Reyes, WEIGHT MANAGEMENT        []Other  Emily Cortez, RN CDE

## 2017-11-08 ENCOUNTER — HOSPITAL ENCOUNTER (OUTPATIENT)
Dept: DIABETES SERVICES | Age: 67
Setting detail: THERAPIES SERIES
Discharge: HOME | End: 2017-11-08
Payer: MEDICARE

## 2017-11-08 PROCEDURE — G0108 DIAB MANAGE TRN  PER INDIV: HCPCS

## 2017-11-08 PROCEDURE — G0109 DIAB MANAGE TRN IND/GROUP: HCPCS

## 2017-11-09 NOTE — PROGRESS NOTES
Diabetes Self- Management Education Program     Diet History :  Diet Questionnaire and typical meal /portion sheet completed  [x]Yes  [] NO    Goals setting:  Goal work sheet completed  [x]Yes  [] NO  (SEE  EDUCATION AND GOALS FLOWSHEET)    MEDICAL HISTORY:  Past Medical History:   Diagnosis Date    Chronic kidney disease     Diabetes ([facility]     Hyperlipidemia     Hypertension     [facility]     Osteoarthritis     [facility]monary [facility]     2014    Type 2 diabetes mellitus without complication ([facility]      Family History   Problem Relation Age of Onset    Kidney Disease Mother     Cancer Father     Arthritis Brother      Review of patient's allergies indicates no known allergies. Immunization History   Administered Date(s) Administered    Influenza, Quadv, 3 Years and older, IM 09/25/2017     Current Medications  Current Outpatient Prescriptions   Medication Sig Dispense Refill    LORazepam (ATIVAN) 0.5 MG tablet TAKE ONE TABLET BY MOUTH EVERY 12 HOURS AS NEEDED FOR ANXIETY 60 tablet 0    metFORMIN (GLUCOPHAGE-XR) 500 MG extended release tablet Take 1 tablet by mouth 2 times daily (before meals) Take 500 mg a day for 2 weeks, then increase the dose to twice a day. 60 tablet 11    glimepiride (AMARYL) 4 MG tablet Take 1 tablet by mouth 2 times daily 180 tablet 1    warfarin (COUMADIN) 3 MG tablet Take 1 tablet by mouth daily 90 tablet 1    lisinopril-hydrochlorothiazide (PRINZIDE;ZESTORETIC) 20-25 MG per tablet Take 1 tablet by mouth daily 90 tablet 3    atorvastatin (LIPITOR) 20 MG tablet Take 1 tablet by mouth daily 90 tablet 3    Accu-Chek Softclix Lancets MISC Use 2 TIMES DAILY. Dx: E11.22, N18.3. Type II  each 3    Glucose Blood (BLOOD GLUCOSE TEST STRIPS) STRP Test_2__times daily    Diagnosis: 250.0   Diabetes Mellitus______Non-Insulin Dependent 100 strip 11    Alcohol Swabs PADS Need to use  twice a day 100 each 3    Blood Glucose Monitoring Suppl FLAVIA Glucometer Accu check. Test Blood Sugars 2 Times Daily. Dx: E 11.22, N 18.3 type II DM 1 Device 0    omeprazole (PRILOSEC) 20 MG delayed release capsule Take 1 capsule by mouth Daily 90 capsule 3    acetaminophen (TYLENOL) 500 MG tablet Take 1,000 mg by mouth every 6 hours as needed for Pain      diphenhydrAMINE (ALLERGY RELIEF) 25 MG tablet Take 25 mg by mouth every 6 hours as needed for Itching       No current facility-administered medications for this encounter.    :     Comments:  Allergies:  No Known Allergies  Diabetes 5  / Health Status    A1C blood level - at goal < 7%   Lab Results   Component Value Date    LABA1C 14 06/20/2017    LABA1C 5.5 03/23/2014    LABA1C 5.4 03/22/2014     Lab Results   Component Value Date    CREATININE 1.45 06/20/2017       Blood pressure ( 130/ 80)  Or less  BP Readings from Last 3 Encounters:   09/25/17 110/80   09/11/17 120/74   08/14/17 120/78        Cholesterol ( LDL under  100)   No results found for: LDLCALC, LDLCHOLESTEROL, LDLDIRECT    4 . Smoking ? []Yes   [x]No    5. Taking an Asprin daily? []Yes   [x]No          Diabetes Self- Management Education Record    Participant Name: Brandon Mena  Referring Provider: Ramon Duran CNP   Assessment/Evaluation Ratings:  1=Needs Instruction   4=Demonstrates Understanding/Competency  2=Needs Review   NC=Not Covered    3=Comprehends Key Points  N/A=Not Applicable  Topics/Learning Objectives Pre-session Assess Date:  8/29/17RS Instr. Date Reinforce Date Post- session Eval Comments   Diabetes disease process & Treatment process: Define diabetes & pre-diabetes; Identify own type of diabetes; role of the pancreas; signs/symptoms; diagnostic criteria; prevention & treatment options; contributing factors.  2 11/2/17 RS   Stated new DX with elevated BG - of over 500 in June 2017 ( she was very thirsty and was eating a lot of italian ice and did not think that was full of sugar)    but had known about pre DM and worked hard to lower her daily CHO / about 10 lbs ( start wt 183 lbs  - 8/28/17     Wt Readings from Last 3 Encounters:   09/25/17 186 lb 6.4 oz (84.6 kg)   09/11/17 184 lb (83.5 kg)   08/14/17 183 lb 3.2 oz (83.1 kg)                 My goal , to help me improve my health, I will: Healthy eating  1. Be consistent with meal planning and CHO counting      2. Plan  Follow-up Appointments planned in individual and group setting. Will start with DANTE tan with RD - then go through the group class sessions at [facility] in Nov 2017    Next Appointment in 1 weeks. Instruction Method: [x]Lecture/Discussion  []Power Point Presentation  [x]Handouts  []Return Demonstration      Education Materials/Equipment Provided:    [x]Self-Management  Initial assessment  Enrolment in to ADA  Where do I Begin, Living with Type 2 diabetes ADA home support program and handout for no concentrated sweets and diet meal planning basics, handout on diabetes education classes.  8/29/17RS    [x]Self-Management  Class 1  Ellie Living Well with Diabetes Booklet and Healthy i On the Road to better managing your diabetes map handouts--11/2/17 RS  [x] Self-Management  Class 2 - Meal Plan and handout for serving sizes, smarter snacking, Ready Set Carb Counting / Plate Method, Nutrient Conversion and International Diabetes [address] Eating for People with Diabetes and Nutrition in the [employer]  fast facts about fast food9/25/17 JW11?8/17 JW  [] Self-Management  Class 3   Diabetes ID card,  foot care tips sheet, Healthy I  Continuing Your Journey with Diabetes map handout, Individualized Diabetes report card    [] Self-Management Class 4  BD Booklet  Sick Day Rules and  Dinning Out Guide , recipe hand outs and tips, diabetes Cookbooks  ( when available)     []Self-Management  3 month follow  up  AADE booklet Side by Side a partnership approach to diabetes self- care, [place] Tobacco Quit line, [facility] Diabetes support program information sheet, [facility] management Center information sheet       []Self-Management  Gestational  RN class -Resource materials provided today include educational self care booklet - \" Gestational Diabetes - A Healthy pregnancy and beyond\"  with SMGB and 3 small meals and 3 small snacks diet plan. SMBG sheets to fax back to M weekly.  [facility] guidelines for SMGB and blood glucose log sheets, Never too early to prevent diabetes handout from [facility]      []Self-Management Gestational  RD class  My Food Plan for Gestational diabetes    []Glucose Meter     []Insulin Kit     []Other      Encounter Type Date Start Time End Time Comments No Show Dates   Assessment   8/29/17RS  11 30   12 30   [x]In Person  []Telephone  With  dave    Class 1 - Understanding diabetes 11/2/17 RS  1 30   3 30  x - at PB     Class 2- Nutrition and diabetes     11/8/17 JW   1:30   3:00   Alone PB    Class 3  Preventing Complications         Class 4 -  In depth Nutrition and sick day care        Class 5  3 month follow up / goal reassessment        Gestational  RN         Gestational - RD        Individual MNT 9/25/17 JW 1:30 3:00  w spouse    Shared Med Appt         Yearly Follow-up        Meter Instrx        Insulin Instrx      []Pen  []Vial & Syringe      DSMS Support Plan:  Follow-up plan:     [] MNT referral request / Appointment     [] Annual update referral request and appointment  after      []ADA  Where do I Begin, Living with Type 2 diabetes ADA home support program     [] Healthy U - Diabetes workshops via [address] [place] [address]       [] Starting [employer] program /  [employer] [phone]    []  Support Group / Agustín [address] 8 week diabetes education support   classes [place] [phone]      []   [employer] application  / scholarship program     []ADA care 4 life denita      []  Internet web sites - ADA and D- life    Post Education Referrals:      [] [address] information sheet and [address] [facility] , 21       [] Dental care - [facility] care of [facility] or [facility] dental clinics    [] [facility] link  phone number - for information and referral to [facility], WE[facility]NAGEMENT        []Other  Nikia Cowan RD, LD CDE

## 2017-11-13 ENCOUNTER — OFFICE VISIT (OUTPATIENT)
Dept: PODIATRY | Age: 67
End: 2017-11-13
Payer: MEDICARE

## 2017-11-13 ENCOUNTER — OFFICE VISIT (OUTPATIENT)
Dept: PRIMARY CARE CLINIC | Age: 67
End: 2017-11-13
Payer: MEDICARE

## 2017-11-13 VITALS
HEIGHT: 63 IN | WEIGHT: 183 LBS | HEART RATE: 89 BPM | BODY MASS INDEX: 32.43 KG/M2 | DIASTOLIC BLOOD PRESSURE: 62 MMHG | SYSTOLIC BLOOD PRESSURE: 94 MMHG | OXYGEN SATURATION: 95 %

## 2017-11-13 VITALS — BODY MASS INDEX: 32.6 KG/M2 | HEIGHT: 63 IN | WEIGHT: 184 LBS

## 2017-11-13 DIAGNOSIS — E78.2 MIXED HYPERLIPIDEMIA: ICD-10-CM

## 2017-11-13 DIAGNOSIS — D23.72 BENIGN NEOPLASM OF SKIN OF LOWER LIMB, INCLUDING HIP, LEFT: ICD-10-CM

## 2017-11-13 DIAGNOSIS — E11.42 DIABETIC POLYNEUROPATHY ASSOCIATED WITH TYPE 2 DIABETES MELLITUS (HCC): Primary | ICD-10-CM

## 2017-11-13 DIAGNOSIS — D64.9 ANEMIA, UNSPECIFIED TYPE: ICD-10-CM

## 2017-11-13 DIAGNOSIS — E11.51 TYPE II DIABETES MELLITUS WITH PERIPHERAL CIRCULATORY DISORDER (HCC): ICD-10-CM

## 2017-11-13 DIAGNOSIS — S91.311A LACERATION WITHOUT FOREIGN BODY, RIGHT FOOT, INITIAL ENCOUNTER: ICD-10-CM

## 2017-11-13 DIAGNOSIS — E11.9 TYPE 2 DIABETES MELLITUS WITHOUT COMPLICATION, WITHOUT LONG-TERM CURRENT USE OF INSULIN (HCC): Primary | ICD-10-CM

## 2017-11-13 LAB
BASOPHILS ABSOLUTE: NORMAL /ΜL
BASOPHILS RELATIVE PERCENT: NORMAL %
CHOLESTEROL, TOTAL: 173 MG/DL
CHOLESTEROL/HDL RATIO: 3
EOSINOPHILS ABSOLUTE: NORMAL /ΜL
EOSINOPHILS RELATIVE PERCENT: NORMAL %
HBA1C MFR BLD: 6.3 %
HCT VFR BLD CALC: 36.9 % (ref 36–46)
HDLC SERPL-MCNC: 57 MG/DL (ref 35–70)
HEMOGLOBIN: 12.1 G/DL (ref 12–16)
INR BLD: 3
LDL CHOLESTEROL CALCULATED: 74 MG/DL (ref 0–160)
LYMPHOCYTES ABSOLUTE: NORMAL /ΜL
LYMPHOCYTES RELATIVE PERCENT: NORMAL %
MCH RBC QN AUTO: 28.9 PG
MCHC RBC AUTO-ENTMCNC: 32.9 G/DL
MCV RBC AUTO: 88 FL
MONOCYTES ABSOLUTE: NORMAL /ΜL
MONOCYTES RELATIVE PERCENT: NORMAL %
NEUTROPHILS ABSOLUTE: NORMAL /ΜL
NEUTROPHILS RELATIVE PERCENT: NORMAL %
PLATELET # BLD: 324 K/ΜL
PMV BLD AUTO: 9.2 FL
RBC # BLD: 4.2 10^6/ΜL
TRIGL SERPL-MCNC: 208 MG/DL
VLDLC SERPL CALC-MCNC: 42 MG/DL
WBC # BLD: 10.9 10^3/ML

## 2017-11-13 PROCEDURE — 3017F COLORECTAL CA SCREEN DOC REV: CPT | Performed by: PODIATRIST

## 2017-11-13 PROCEDURE — 4040F PNEUMOC VAC/ADMIN/RCVD: CPT | Performed by: PODIATRIST

## 2017-11-13 PROCEDURE — G8484 FLU IMMUNIZE NO ADMIN: HCPCS | Performed by: PODIATRIST

## 2017-11-13 PROCEDURE — 1090F PRES/ABSN URINE INCON ASSESS: CPT | Performed by: NURSE PRACTITIONER

## 2017-11-13 PROCEDURE — G8427 DOCREV CUR MEDS BY ELIG CLIN: HCPCS | Performed by: PODIATRIST

## 2017-11-13 PROCEDURE — 99214 OFFICE O/P EST MOD 30 MIN: CPT | Performed by: NURSE PRACTITIONER

## 2017-11-13 PROCEDURE — 1090F PRES/ABSN URINE INCON ASSESS: CPT | Performed by: PODIATRIST

## 2017-11-13 PROCEDURE — 1123F ACP DISCUSS/DSCN MKR DOCD: CPT | Performed by: PODIATRIST

## 2017-11-13 PROCEDURE — 17110 DESTRUCTION B9 LES UP TO 14: CPT | Performed by: PODIATRIST

## 2017-11-13 PROCEDURE — 3017F COLORECTAL CA SCREEN DOC REV: CPT | Performed by: NURSE PRACTITIONER

## 2017-11-13 PROCEDURE — 3014F SCREEN MAMMO DOC REV: CPT | Performed by: PODIATRIST

## 2017-11-13 PROCEDURE — 3044F HG A1C LEVEL LT 7.0%: CPT | Performed by: PODIATRIST

## 2017-11-13 PROCEDURE — G8417 CALC BMI ABV UP PARAM F/U: HCPCS | Performed by: NURSE PRACTITIONER

## 2017-11-13 PROCEDURE — 3014F SCREEN MAMMO DOC REV: CPT | Performed by: NURSE PRACTITIONER

## 2017-11-13 PROCEDURE — 83036 HEMOGLOBIN GLYCOSYLATED A1C: CPT | Performed by: NURSE PRACTITIONER

## 2017-11-13 PROCEDURE — 1036F TOBACCO NON-USER: CPT | Performed by: NURSE PRACTITIONER

## 2017-11-13 PROCEDURE — G8427 DOCREV CUR MEDS BY ELIG CLIN: HCPCS | Performed by: NURSE PRACTITIONER

## 2017-11-13 PROCEDURE — 1036F TOBACCO NON-USER: CPT | Performed by: PODIATRIST

## 2017-11-13 PROCEDURE — G8484 FLU IMMUNIZE NO ADMIN: HCPCS | Performed by: NURSE PRACTITIONER

## 2017-11-13 PROCEDURE — 3044F HG A1C LEVEL LT 7.0%: CPT | Performed by: NURSE PRACTITIONER

## 2017-11-13 PROCEDURE — G8400 PT W/DXA NO RESULTS DOC: HCPCS | Performed by: NURSE PRACTITIONER

## 2017-11-13 PROCEDURE — G8417 CALC BMI ABV UP PARAM F/U: HCPCS | Performed by: PODIATRIST

## 2017-11-13 PROCEDURE — 4040F PNEUMOC VAC/ADMIN/RCVD: CPT | Performed by: NURSE PRACTITIONER

## 2017-11-13 PROCEDURE — 1123F ACP DISCUSS/DSCN MKR DOCD: CPT | Performed by: NURSE PRACTITIONER

## 2017-11-13 PROCEDURE — G8400 PT W/DXA NO RESULTS DOC: HCPCS | Performed by: PODIATRIST

## 2017-11-13 ASSESSMENT — ENCOUNTER SYMPTOMS
BLURRED VISION: 0
VISUAL CHANGE: 0
ORTHOPNEA: 0
COUGH: 0
ABDOMINAL PAIN: 0
BACK PAIN: 0
SHORTNESS OF BREATH: 0

## 2017-11-13 NOTE — PROGRESS NOTES
[facility]  Return Patient    Chief Complaint   Patient presents with    Diabetes     [facility] & [place]    Peripheral Neuropathy       Subjective: Brandon Mena comes to clinic for Diabetes [facility] & [place]) and Peripheral Neuropathy    she is a diabetic and states that she checks her sugars daily. Pt currently has complaint of thickened, painful spot between her 4th and 5th toes left. States she dropped a plate to the top of her right foot and had pain and bleeding immediately. This happened this moring   Pt's primary care physician is Ramon Duran CNP   Pt's last blood sugar was 125 . Lab Results   Component Value Date    LABA1C 6.3 11/13/2017      Complains of numbness in the feet bilat. Past Medical History:   Diagnosis Date    Chronic kidney disease     Diabetes ([address]     Hyperlipidemia     Hypertension     [facility]     Osteoarthritis     Pulmonary embolism ([facility]     2014    Type 2 diabetes mellitus without complication (HCC)        No Known Allergies  Current Outpatient Prescriptions on File Prior to Visit   Medication Sig Dispense Refill    LORazepam (ATIVAN) 0.5 MG tablet TAKE ONE TABLET BY MOUTH EVERY 12 HOURS AS NEEDED FOR ANXIETY 60 tablet 0    metFORMIN (GLUCOPHAGE-XR) 500 MG extended release tablet Take 1 tablet by mouth 2 times daily (before meals) Take 500 mg a day for 2 weeks, then increase the dose to twice a day. 60 tablet 11    glimepiride (AMARYL) 4 MG tablet Take 1 tablet by mouth 2 times daily 180 tablet 1    warfarin (COUMADIN) 3 MG tablet Take 1 tablet by mouth daily 90 tablet 1    lisinopril-hydrochlorothiazide (PRINZIDE;ZESTORETIC) 20-25 MG per tablet Take 1 tablet by mouth daily 90 tablet 3    atorvastatin (LIPITOR) 20 MG tablet Take 1 tablet by mouth daily 90 tablet 3    Accu-Chek Softclix Lancets MISC Use 2 TIMES DAILY. Dx: E11.22, N18.3.  Type II  each 3    Glucose Blood (BLOOD GLUCOSE TEST STRIPS) STRP Test_2__times daily    Diagnosis: Musculoskeletal:     1st MPJ ROM decreased, Bilateral.  Muscle strength 5/5, Bilateral.  Pain present upon palpation of toenails 1-5, Bilateral. decreased medial longitudinal arch, Bilateral.  Ankle ROM decreased,Bilateral.    Dorsally contracted digits present digits 2, Bilateral.     Vascular: DP and PT pulses palpable 1/4, Bilateral.  CFT <5 seconds, Bilateral.  Hair growth absent to the level of the digits, Bilateral.  Edema present, Bilateral.  Varicosities absent, Bilateral. Erythema absent, Bilateral    Neurological: Sensation diminshed to light touch to level of digits, Bilateral.  Protective sensation intact 6/10 sites via 5.07/10g [place] Monofilament, Bilateral.  negative Tinel's, Bilateral.  negative Valleix sign, Bilateral.      Integument: Warm, dry, supple, Bilateral.  Open lasceration to the right dorsal foot but no subq tissue or tendons exposed. Open lesion absent, Bilateral.  Interdigital maceration absent to web spaces 4, Bilateral.  Nails 1-5 left and 1-5 right thickened > 3.0 mm, dystrophic and crumbly, discolored with subungual debris. Fissures absent, Bilateral.       Assessment:  75 y.o. female with:  1. Diabetic polyneuropathy associated with type 2 diabetes mellitus (HCC)   DIABETES FOOT EXAM    51467 - OR DESTRUCTION BENIGN LESIONS UP TO 14   2. Type II diabetes mellitus with peripheral circulatory disorder (HCC)   DIABETES FOOT EXAM    70412 - OR DESTRUCTION BENIGN LESIONS UP TO 14   3. Laceration without foreign body, right foot, initial encounter  58566 - OR DESTRUCTION BENIGN LESIONS UP TO 14   4. Benign neoplasm of skin of lower limb, including hip, left  99064 - OR DESTRUCTION BENIGN LESIONS UP TO 14           Q7   []Yes    []No                Q8   [x]Yes    []No                     Q9   []Yes    []No    Plan:   Pt was evaluated and examined. Patient was given personalized discharge instructions.    The lesion was partially excised and Pyrogallic acid was applied under occlusion. The patient will leave in place for 24-48 hours and than remove. The patient tolerated the procedure well and without complication. . Pt will follow up in 2 weeks or sooner if any problems arise. Steristrips applied to the lasteration and tinture of benzoin and voereed in DSD. Pt to keep her right foot out of shower for 4 days to allow the laceration to heal.  Diagnosis was discussed with the pt and all of their questions were answered in detail. Proper foot hygiene and care was discussed with the pt. Informed patient on proper diabetic foot care and importance of tight glycemic control. Patient to check feet daily and contact the office with any questions/problems/concerns.    Other comorbidity noted and will be managed by PCP.  11/13/2017    Electronically signed by Dr. Melly Rosales DPM on 11/13/2017 at 2:09 PM

## 2017-11-13 NOTE — PROGRESS NOTES
[facility][1] & [facility] PHYSICIANS  [facility] INTERNAL MEDICINE  [address]  [address]  [place] [address] [address]  Dept: [phone]  Dept Fax: [phone]    Cata Hilario is a 75 y.o. female who presents today for her medical conditions/complaints as noted below. Cata Hilario is c/o of Hypertension (6 month recheck ); Diabetes (6 month recheck ); and Laceration (right foot dropped glass bowl on it this morning )        HPI:     Presents with  Sara Atkinson with podiatry today     Dropped a glass bowl on her right foot this am with laceration  Bleeding stopped per   Not currently bleeding      Hypertension   This is a chronic problem. The current episode started more than 1 year ago. The problem is unchanged. The problem is controlled. Pertinent negatives include no blurred vision, chest pain, headaches, malaise/fatigue, neck pain, orthopnea, palpitations, peripheral edema, PND, shortness of breath or sweats. There are no associated agents to hypertension. Risk factors for coronary artery disease include diabetes mellitus, obesity and post-menopausal state. Past treatments include ACE inhibitors and diuretics. The current treatment provides significant improvement. There are no compliance problems. Diabetes   She presents for her follow-up diabetic visit. She has type 2 diabetes mellitus. Her disease course has been stable. There are no hypoglycemic associated symptoms. Pertinent negatives for hypoglycemia include no confusion, dizziness, headaches, hunger, nervousness/anxiousness or sweats. Pertinent negatives for diabetes include no blurred vision, no chest pain, no fatigue, no foot paresthesias, no foot ulcerations, no polydipsia, no polyphagia, no polyuria, no visual change, no weakness and no weight loss. There are no hypoglycemic complications. Symptoms are stable. There are no diabetic complications. She is compliant with treatment all of the time. Her weight is stable.  She is following a diabetic diet. When asked about meal planning, she reported none. She has not had a previous visit with a dietitian. She rarely participates in exercise. There is no change in her home blood glucose trend. An ACE inhibitor/angiotensin II receptor blocker is being taken. She sees a podiatrist.Eye exam is current. Laceration          Past Medical History:   Diagnosis Date    Chronic kidney disease     [facility]     Hyperlipidemia     Hypertension     Neuropathy ([facility]     Osteoarthritis     [facility]     2014    Type 2 diabetes mellitus without complication (HCC)       Past Surgical History:   Procedure Laterality Date    BREAST SURGERY      COLONOSCOPY      FOOT SURGERY      JOINT REPLACEMENT         Family History   Problem Relation Age of Onset    Kidney Disease Mother     Cancer Father     Arthritis Brother        Social History   Substance Use Topics    Smoking status: Never Smoker    Smokeless tobacco: Never Used    Alcohol use No      Current Outpatient Prescriptions   Medication Sig Dispense Refill    LORazepam (ATIVAN) 0.5 MG tablet TAKE ONE TABLET BY MOUTH EVERY 12 HOURS AS NEEDED FOR ANXIETY 60 tablet 0    metFORMIN (GLUCOPHAGE-XR) 500 MG extended release tablet Take 1 tablet by mouth 2 times daily (before meals) Take 500 mg a day for 2 weeks, then increase the dose to twice a day. 60 tablet 11    glimepiride (AMARYL) 4 MG tablet Take 1 tablet by mouth 2 times daily 180 tablet 1    warfarin (COUMADIN) 3 MG tablet Take 1 tablet by mouth daily 90 tablet 1    lisinopril-hydrochlorothiazide (PRINZIDE;ZESTORETIC) 20-25 MG per tablet Take 1 tablet by mouth daily 90 tablet 3    atorvastatin (LIPITOR) 20 MG tablet Take 1 tablet by mouth daily 90 tablet 3    Accu-Chek Softclix Lancets MISC Use 2 TIMES DAILY. Dx: E11.22, N18.3.  Type II  each 3    Glucose Blood (BLOOD GLUCOSE TEST STRIPS) STRP Test_2__times daily    Diagnosis: 250.0   Diabetes Mellitus______Non-Insulin Dependent 100 strip 11    Alcohol Swabs PADS Need to use  twice a day 100 each 3    Blood Glucose Monitoring Suppl FLAVIA Glucometer Accu check. Test Blood Sugars 2 Times Daily. Dx: E 11.22, N 18.3 type II DM 1 Device 0    omeprazole (PRILOSEC) 20 MG delayed release capsule Take 1 capsule by mouth Daily 90 capsule 3    acetaminophen (TYLENOL) 500 MG tablet Take 1,000 mg by mouth every 6 hours as needed for Pain      diphenhydrAMINE (ALLERGY RELIEF) 25 MG tablet Take 25 mg by mouth every 6 hours as needed for Itching       No current facility-administered medications for this visit. No Known Allergies    Health Maintenance   Topic Date Due    Hepatitis C screen  1950    Diabetic microalbuminuria test  09/28/1967    DTaP/Tdap/Td vaccine (1 - Tdap) 09/28/1968    Breast cancer screen  09/28/1999    Colon cancer screen colonoscopy  09/28/1999    Zostavax vaccine  09/28/2009    DEXA (modify frequency per FRAX score)  09/28/2014    Pneumococcal low/med risk (1 of 2 - PCV13) 09/28/2014    Diabetic hemoglobin A1C test  06/20/2018    Lipid screen  06/20/2018    Diabetic foot exam  09/11/2018    Diabetic retinal exam  09/28/2018    Flu vaccine  Completed       Subjective:      Review of Systems   Constitutional: Negative for chills, fatigue, fever, malaise/fatigue and weight loss. HENT: Negative for congestion. Eyes: Negative for blurred vision. Respiratory: Negative for cough and shortness of breath. Cardiovascular: Negative for chest pain, palpitations, orthopnea and PND. Gastrointestinal: Negative for abdominal pain. Endocrine: Negative for polydipsia, polyphagia and polyuria. Genitourinary: Negative for dysuria. Musculoskeletal: Negative for back pain and neck pain. Skin:        Laceration to right foot   Neurological: Negative for dizziness, weakness, numbness and headaches.    Psychiatric/Behavioral: Negative for confusion, self-injury, sleep disturbance and suicidal ideas. The patient is not nervous/anxious. Objective:     Physical Exam   Constitutional: She is oriented to person, place, and time. She appears well-developed and well-nourished. HENT:   Head: Normocephalic and atraumatic. Eyes: Pupils are equal, round, and reactive to light. Neck: Normal range of motion. Neck supple. Cardiovascular: Normal rate, regular rhythm and normal heart sounds. Pulmonary/Chest: Effort normal and breath sounds normal.   Abdominal: Soft. Bowel sounds are normal. There is no tenderness. Musculoskeletal: Normal range of motion. Neurological: She is alert and oriented to person, place, and time. Skin: Skin is warm and dry. Approx 3cm laceration. Cleansed wound with normal saline  No bleeding noted   Psychiatric: She has a normal mood and affect. Her behavior is normal. Judgment and thought content normal.   Nursing note and vitals reviewed. BP 94/62   Pulse 89   Ht 5' 3\" (1.6 m)   Wt 183 lb (83 kg)   SpO2 95%   BMI 32.42 kg/m²     Assessment:      1. Type 2 diabetes mellitus without complication, without long-term current use of insulin ([facility])  POCT glycosylated hemoglobin (Hb A1C)   2. Anemia, unspecified type  CBC   3. Mixed hyperlipidemia  Lipid Panel       Plan:      Return in about 3 months (around 2/13/2018) for Diabetes. 1. DM- Hga1c 6.3. Very well controlled with current meds. Has been going to diabetic education. States that she is following diet. Will try to increase exercise with her Wii. Follow up in three months for recheck. 2. Anemia- Rx given for repeat CBC, follow up pending results. 3. Hyperlipidemia- Rx given for repeat lipids, follow up pending results. 4. Laceration, right foot- Patient declines sutures.  will obtain glue and close wound at home. Notify office for any problems/concerns. Follow with podiatry as scheduled.     Of the 25 minute duration appointment visit, Eugenio MOYA spent at least 50% of the face-to-face time in counseling, explanation of diagnosis, planning of further management, and answering all questions. Orders Placed This Encounter   Procedures    CBC     Standing Status:   Future     Standing Expiration Date:   11/13/2018    Lipid Panel     Standing Status:   Future     Standing Expiration Date:   11/13/2018     Order Specific Question:   Is Patient Fasting?/# of Hours     Answer:   12    POCT glycosylated hemoglobin (Hb A1C)         Patient given educational materials - see patient instructions. Discussed use, benefit, and side effects of prescribed medications. All patient questions answered. Pt voiced understanding. Reviewed health maintenance. Instructed to continue current medications, diet and exercise. Patient agreed with treatment plan. Follow up as directed.       Electronically signed by Adelaida Mahajan CNP on 11/13/2017 at 9:37 AM

## 2017-11-14 ENCOUNTER — ANTI-COAG VISIT (OUTPATIENT)
Dept: PRIMARY CARE CLINIC | Age: 67
End: 2017-11-14

## 2017-11-14 DIAGNOSIS — D64.9 ANEMIA, UNSPECIFIED TYPE: ICD-10-CM

## 2017-11-14 DIAGNOSIS — E78.2 MIXED HYPERLIPIDEMIA: ICD-10-CM

## 2017-11-15 ENCOUNTER — TELEPHONE (OUTPATIENT)
Dept: PRIMARY CARE CLINIC | Age: 67
End: 2017-11-15

## 2017-11-15 ENCOUNTER — HOSPITAL ENCOUNTER (OUTPATIENT)
Dept: DIABETES SERVICES | Age: 67
Setting detail: THERAPIES SERIES
Discharge: HOME | End: 2017-11-15
Payer: MEDICARE

## 2017-11-15 PROCEDURE — G0108 DIAB MANAGE TRN  PER INDIV: HCPCS

## 2017-11-15 NOTE — TELEPHONE ENCOUNTER
Spoke with patient's  regarding lab results, PT/INR results and to continue with current coumadin regimen.

## 2017-11-15 NOTE — LETTER
[facility]  [address] [address]  Phone: [phone]         November 16, 2017    To Lloyd Davis, CNP  :  From: Thomas Anand RN    Patient: Yokasta Saxena   YOB: 1950   Date of Visit:      The following content has been reviewed since the last assessment: Managing Diabetes (Carb counting, monitoring, medications, physical activity)  Balancing Diabetes (Meal planning, using BG results, identifying BG targets)  Reducing Risks  Problem Solving  Follow up assessment- Patient is working to maintain healthy eating habits and follow CHO controlled meal plan. An ongoing plan was created to include:follow up and Post Program Support (see below) DMED is planning phone follow up in 3 months. Post Program Self Management Support Plans include:  Diabetes Care Appointments with MD, Community Programs/Support Groups and Annual Review    Thank you for the opportunity to provide Diabetes Self Management Education to your patient.

## 2017-11-16 NOTE — PROGRESS NOTES
Diabetes Self- Management Education Program Assessment -   Also see Diabetic Screening  Patient, Aixa Strickland,  here for diabetes self-management education  visit/ assessment. Today's visit was in an individual setting. Diet History :  Diet Questionnaire and typical meal /portion sheet completed  [x]Yes  [] NO    Goals setting:  Goal work sheet completed  [x]Yes  [] NO  (SEE  EDUCATION AND GOALS FLOWSHEET)    MEDICAL HISTORY:  Past Medical History:   Diagnosis Date    Chronic kidney disease     [facility] ([facility]     Hyperlipidemia     Hypertension     Neuropathy ([facility]     Osteoarthritis     Pulmonary embolism ([facility]     2014    Type 2 diabetes mellitus without complication ([facility]      Family History   Problem Relation Age of Onset    Kidney Disease Mother     Cancer Father     Arthritis Brother      Review of patient's allergies indicates no known allergies. Immunization History   Administered Date(s) Administered    Influenza, Quadv, 3 Years and older, IM 09/25/2017     Current Medications  Current Outpatient Prescriptions   Medication Sig Dispense Refill    LORazepam (ATIVAN) 0.5 MG tablet TAKE ONE TABLET BY MOUTH EVERY 12 HOURS AS NEEDED FOR ANXIETY 60 tablet 0    metFORMIN (GLUCOPHAGE-XR) 500 MG extended release tablet Take 1 tablet by mouth 2 times daily (before meals) Take 500 mg a day for 2 weeks, then increase the dose to twice a day. 60 tablet 11    glimepiride (AMARYL) 4 MG tablet Take 1 tablet by mouth 2 times daily 180 tablet 1    warfarin (COUMADIN) 3 MG tablet Take 1 tablet by mouth daily 90 tablet 1    lisinopril-hydrochlorothiazide (PRINZIDE;ZESTORETIC) 20-25 MG per tablet Take 1 tablet by mouth daily 90 tablet 3    atorvastatin (LIPITOR) 20 MG tablet Take 1 tablet by mouth daily 90 tablet 3    Accu-Chek Softclix Lancets MISC Use 2 TIMES DAILY. Dx: E11.22, N18.3.  Type II  each 3    Glucose Blood (BLOOD GLUCOSE TEST STRIPS) STRP Test_2__times daily    Diagnosis: 250.0 testing; safe lancet disposal.   1 11/1/2017 rs   Lab Results   Component Value Date    LABA1C 6.3 11/13/2017    LABA1C 14 06/20/2017    LABA1C 5.5 03/23/2014     11/15/2017- patient was very happy follow up a1c in target - PCP stated follow up now is every 6 mo   Prevention, detection & treatment of acute complications:  Identify symptoms of hyper & hypoglycemia, and prevention & treatment strategies. 1 11/1/2017 rs   No lows - had very high BG in summer - aware of symptoms of high and now is checking her BG regularly    Describe sick day guidelines & indications for  physician notification. Identify short term consequences of poor control. 1 11/15/2017rs      Prevention, detection & treatment of chronic complications:  Define the natural course of diabetes & describe the relationship of blood glucose levels to long term complications of diabetes. Identify preventative measures & standards of care. 1 11/15/2017rs   Needs to see eye MD - stated will call   needs to see foot MD - has an appt 9/2017  Has followed with kidney MD - also fears kidney damage - her mom was on dialysis  Hx of lung clots - on coumadin  On b/p and cholesterol meds    New symptom is foot pain ? Nerve damage     11/15/2017rs- had minor cut to foot last week and also had follow up with podiatry - for nail care - plan to follow up with podiatry in next to week to make sure foot heals    Needs dental care - given name and phone numbers for [facility] clinic and [address] [place] [address] dental care -11/15/2017rs-   Developing strategies to address psychosocial issues:  Describe feelings about living with diabetes; Describe how stress, depression & anxiety affect blood glucose; Identify coping strategies; Identify support needed & support network available. 1 11/1/2017 rs   Patient has anxiety - takes medications for this - has been coping better recently and now getting out more mobility scooter.   Stated in may and June was not feeling good - more depressed and was eating lots of sugar and not caring about health as much    Developing strategies to promote health/change behavior: Identify 7 self-care behaviors; Personal health risk factors; Benefits, challenges & strategies for behavioral change;    1 11/1/2017 rs     Wants to go to group class for both education and social support   Individualized goal selection. Desires weight loss - goal about 10 lbs ( start wt 183 lbs  - 8/28/17     Wt Readings from Last 3 Encounters:   11/13/17 184 lb (83.5 kg)   11/13/17 183 lb (83 kg)   09/25/17 186 lb 6.4 oz (84.6 kg)                 My goal , to help me improve my health, I will: Healthy eating  1. Be consistent with meal planning and CHO counting      2. Plan  Follow-up Appointments planned in individual and group setting. Will start with DANTE tan with RD - then go through the group class sessions at [facility] in Nov 2017    Completed sessions at [facility] location - will plan for follow -up call in 3 mo    Instruction Method: [x]Lecture/Discussion  []Power Point Presentation  [x]Handouts  []Return Demonstration      Education Materials/Equipment Provided:    [x]Self-Management  Initial assessment  Enrolment in to [address] [address] Where do I Begin, Living with Type 2 diabetes ADA home support program and handout for no concentrated sweets and diet meal planning basics, handout on diabetes education classes.  8/29/17RS    [x]Self-Management  Class 1  [place] Living Well with Diabetes Booklet and Healthy i On the Road to better managing your diabetes map handouts--11/1/2017 rs  [x] Self-Management  Class 2 - Meal Plan and handout for serving sizes, smarter snacking, Ready Set Carb Counting / Plate Method, Nutrient Conversion and International Diabetes [address] Eating for People with Diabetes and Nutrition in the [address]  fast facts about fast food9/25/17 JW  [] Self-Management  Class 3   Diabetes ID card,  foot care tips sheet,

## 2017-11-20 RX ORDER — LORAZEPAM 0.5 MG/1
TABLET ORAL
Qty: 60 TABLET | Refills: 0 | Status: SHIPPED | OUTPATIENT
Start: 2017-11-20 | End: 2017-12-18 | Stop reason: SDUPTHER

## 2017-11-20 NOTE — TELEPHONE ENCOUNTER
08/14/2017   US Renal Complete Once 08/14/2017   Bladder scan Once 08/14/2017   Urine Culture Once 80/94/9975   Basic Metabolic Panel Once 14/81/5844   CBC Auto Differential Once 03/25/2018   Creatinine, Random Urine Once 03/25/2018   Urinalysis Once 09/25/2017   Protein, urine, random Once 03/25/2018       Next Visit Date:  Future Appointments  Date Time Provider Rachael Garsia   11/27/2017 1:15 PM Keiry Yoon DPM 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

## 2017-11-27 ENCOUNTER — OFFICE VISIT (OUTPATIENT)
Dept: PODIATRY | Age: 67
End: 2017-11-27
Payer: MEDICARE

## 2017-11-27 VITALS — WEIGHT: 184 LBS | HEIGHT: 63 IN | BODY MASS INDEX: 32.6 KG/M2

## 2017-11-27 DIAGNOSIS — D23.72 BENIGN NEOPLASM OF SKIN OF LOWER LIMB, INCLUDING HIP, LEFT: ICD-10-CM

## 2017-11-27 DIAGNOSIS — S91.311A LACERATION WITHOUT FOREIGN BODY, RIGHT FOOT, INITIAL ENCOUNTER: Primary | ICD-10-CM

## 2017-11-27 PROCEDURE — 3017F COLORECTAL CA SCREEN DOC REV: CPT | Performed by: PODIATRIST

## 2017-11-27 PROCEDURE — G8427 DOCREV CUR MEDS BY ELIG CLIN: HCPCS | Performed by: PODIATRIST

## 2017-11-27 PROCEDURE — G8484 FLU IMMUNIZE NO ADMIN: HCPCS | Performed by: PODIATRIST

## 2017-11-27 PROCEDURE — 17110 DESTRUCTION B9 LES UP TO 14: CPT | Performed by: PODIATRIST

## 2017-11-27 PROCEDURE — 1123F ACP DISCUSS/DSCN MKR DOCD: CPT | Performed by: PODIATRIST

## 2017-11-27 PROCEDURE — 1090F PRES/ABSN URINE INCON ASSESS: CPT | Performed by: PODIATRIST

## 2017-11-27 PROCEDURE — 4040F PNEUMOC VAC/ADMIN/RCVD: CPT | Performed by: PODIATRIST

## 2017-11-27 PROCEDURE — 3014F SCREEN MAMMO DOC REV: CPT | Performed by: PODIATRIST

## 2017-11-27 PROCEDURE — G8400 PT W/DXA NO RESULTS DOC: HCPCS | Performed by: PODIATRIST

## 2017-11-27 PROCEDURE — G8417 CALC BMI ABV UP PARAM F/U: HCPCS | Performed by: PODIATRIST

## 2017-11-27 PROCEDURE — 99213 OFFICE O/P EST LOW 20 MIN: CPT | Performed by: PODIATRIST

## 2017-11-27 PROCEDURE — 1036F TOBACCO NON-USER: CPT | Performed by: PODIATRIST

## 2017-11-27 NOTE — PROGRESS NOTES
Madai Vega MD   Alcohol Swabs PADS Need to use  twice a day 6/21/17  Yes Madai Vega MD   Blood Glucose Monitoring Suppl FLAVIA Glucometer Accu check. Test Blood Sugars 2 Times Daily. Dx: E 11.22, N 18.3 type II DM 6/21/17  Yes Madai Vega MD   omeprazole (PRILOSEC) 20 MG delayed release capsule Take 1 capsule by mouth Daily 4/12/17  Yes Robert Bojorquez CNP   acetaminophen (TYLENOL) 500 MG tablet Take 1,000 mg by mouth every 6 hours as needed for Pain   Yes Historical Provider, MD   diphenhydrAMINE (ALLERGY RELIEF) 25 MG tablet Take 25 mg by mouth every 6 hours as needed for Itching   Yes Historical Provider, MD       Review of Systems    Lower Extremity Physical Examination:     Vitals: There were no vitals filed for this visit. General: AAO x 3 in NAD. Dermatologic Exam:  Skin lesion present at the 4th interspace left with a central core and petchaie noted to the lesion. There is no irregular borders and no erytheam.  Pain on palpation  Skin No rashes or nodules noted. .       Musculoskeletal:     1st MPJ ROM decreased, Bilateral.  Muscle strength 5/5, Bilateral.  Pain present upon palpation of toenails 1-5, Bilateral. decreased medial longitudinal arch, Bilateral.  Ankle ROM decreased,Bilateral.    Dorsally contracted digits present digits 2, Bilateral.     Vascular: DP and PT pulses palpable 1/4, Bilateral.  CFT <5 seconds, Bilateral.  Hair growth absent to the level of the digits, Bilateral.  Edema present, Bilateral.  Varicosities absent, Bilateral. Erythema absent, Bilateral    Neurological: Sensation diminshed to light touch to level of digits, Bilateral.  Protective sensation intact 6/10 sites via 5.07/10g Rifton-Mahesh Monofilament, Bilateral.  negative Tinel's, Bilateral.  negative Valleix sign, Bilateral.      Integument: Warm, dry, supple, Bilateral.  Open lasceration to the right dorsal foot but no subq tissue or tendons exposed.   Open lesion absent, Bilateral.

## 2017-11-29 ENCOUNTER — APPOINTMENT (OUTPATIENT)
Dept: DIABETES SERVICES | Age: 67
End: 2017-11-29
Payer: MEDICARE

## 2017-12-18 LAB — INR BLD: 2.7

## 2017-12-18 RX ORDER — LORAZEPAM 0.5 MG/1
TABLET ORAL
Qty: 60 TABLET | Refills: 0 | Status: SHIPPED | OUTPATIENT
Start: 2017-12-18 | End: 2018-01-16 | Stop reason: SDUPTHER

## 2017-12-18 NOTE — TELEPHONE ENCOUNTER
Last seen 11/13/17  Last fill 11/20/17 # 60    Health Maintenance   Topic Date Due    Hepatitis C screen  1950    Diabetic microalbuminuria test  09/28/1967    DTaP/Tdap/Td vaccine (1 - Tdap) 09/28/1968    Breast cancer screen  09/28/1999    Colon cancer screen colonoscopy  09/28/1999    Zostavax vaccine  09/28/2009    DEXA (modify frequency per FRAX score)  09/28/2014    Pneumococcal low/med risk (1 of 2 - PCV13) 09/28/2014    Diabetic foot exam  11/13/2018    Diabetic hemoglobin A1C test  11/13/2018    Lipid screen  11/13/2018    Diabetic retinal exam  12/11/2018    Flu vaccine  Completed             (applicable per patient's age: Cancer Screenings, Depression Screening, Fall Risk Screening, Immunizations)    Hemoglobin A1C (%)   Date Value   11/13/2017 6.3   06/20/2017 14   03/23/2014 5.5     LDL Calculated (mg/dL)   Date Value   11/13/2017 74     AST (U/L)   Date Value   06/20/2017 23     ALT (U/L)   Date Value   06/20/2017 35     BUN (mg/dL)   Date Value   06/20/2017 30      (goal A1C is < 7)   (goal LDL is <100) need 30-50% reduction from baseline     BP Readings from Last 3 Encounters:   11/13/17 94/62   09/25/17 110/80   09/11/17 120/74    (goal /80)      All Future Testing planned in [facility]:  Lab Frequency Next Occurrence   Hemoglobin A1C Once 09/06/2017   RAUL DIGITAL SCREEN BILATERAL Once 84/99/5035   Basic Metabolic Panel Once 60/13/9648   CBC Auto Differential Once 08/14/2017   Vitamin D 25 Hydroxy Once 11/12/2017   Electrophoresis Protein, Serum without Reflex to Immunofixation Once 08/14/2017   C3 Complement Once 08/14/2017   C4 Complement Once 08/14/2017   ANNABELLA Once 08/14/2017   Anti-Neutrophilic Cytoplasmic Antibody Once 08/14/2017   Rheumatoid Factor Once 08/14/2017   [place]/Lambda Free Lt Chains, Serum Quant Once 08/14/2017   Creatinine, Random Urine Once 08/14/2017   Urinalysis Once 08/14/2017   Protein Electrophoresis, Urine Once 08/14/2017   Protein, urine, random Once 08/14/2017   US Renal Complete Once 08/14/2017   Bladder scan Once 08/14/2017   Urine Culture Once 45/20/2681   Basic Metabolic Panel Once 38/45/3584   CBC Auto Differential Once 03/25/2018   Creatinine, Random Urine Once 03/25/2018   Urinalysis Once 09/25/2017   Protein, urine, random Once 03/25/2018       Next Visit Date:  Future Appointments  Date Time Provider Rachael Garsia   2/6/2018 10:30 AM ALETHEA Franco Podiatry [facility]   3/26/2018 10:10 AM Allen Wan MD [facility] None            Patient Active Problem List:     Mixed hyperlipidemia     Essential (primary) hypertension     Anxiety     Gastroesophageal reflux disease without esophagitis     Restless leg syndrome     Pulmonary embolism (HCC)     Acute respiratory failure ([address]     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

## 2017-12-20 ENCOUNTER — ANTI-COAG VISIT (OUTPATIENT)
Dept: PRIMARY CARE CLINIC | Age: 67
End: 2017-12-20

## 2018-01-16 RX ORDER — LORAZEPAM 0.5 MG/1
TABLET ORAL
Qty: 60 TABLET | Refills: 0 | Status: SHIPPED | OUTPATIENT
Start: 2018-01-16 | End: 2018-02-15

## 2018-01-16 NOTE — TELEPHONE ENCOUNTER
Pharmacy requests refill of lorazepam, last office visit 11-13-17, approve or deny.   Health Maintenance   Topic Date Due    Hepatitis C screen  1950    Diabetic microalbuminuria test  09/28/1967    DTaP/Tdap/Td vaccine (1 - Tdap) 09/28/1968    Breast cancer screen  09/28/1999    Colon cancer screen colonoscopy  09/28/1999    Zostavax vaccine  09/28/2009    DEXA (modify frequency per FRAX score)  09/28/2014    Pneumococcal low/med risk (1 of 2 - PCV13) 09/28/2014    Potassium monitoring  08/07/2018    Creatinine monitoring  08/07/2018    Diabetic foot exam  11/13/2018    A1C test (Diabetic or Prediabetic)  11/13/2018    Lipid screen  11/13/2018    Diabetic retinal exam  12/11/2018    Flu vaccine  Completed             (applicable per patient's age: Cancer Screenings, Depression Screening, Fall Risk Screening, Immunizations)    Hemoglobin A1C (%)   Date Value   11/13/2017 6.3   06/20/2017 14   03/23/2014 5.5     LDL Calculated (mg/dL)   Date Value   11/13/2017 74     AST (U/L)   Date Value   06/20/2017 23     ALT (U/L)   Date Value   06/20/2017 35     BUN (mg/dL)   Date Value   06/20/2017 30      (goal A1C is < 7)   (goal LDL is <100) need 30-50% reduction from baseline     BP Readings from Last 3 Encounters:   11/13/17 94/62   09/25/17 110/80   09/11/17 120/74    (goal /80)      All Future Testing planned in [facility]:  Lab Frequency Next Occurrence   Hemoglobin A1C Once 09/06/2017   RAUL DIGITAL SCREEN BILATERAL Once 59/03/0786   Basic Metabolic Panel Once 47/75/5959   CBC Auto Differential Once 08/14/2017   Vitamin D 25 Hydroxy Once 11/12/2017   Electrophoresis Protein, Serum without Reflex to Immunofixation Once 08/14/2017   C3 Complement Once 08/14/2017   C4 Complement Once 08/14/2017   ANNABELLA Once 08/14/2017   Anti-Neutrophilic Cytoplasmic Antibody Once 08/14/2017   Rheumatoid Factor Once 08/14/2017   [place]/Lambda Free Lt Chains, Serum Quant Once 08/14/2017   Creatinine, Random Urine Once

## 2018-01-22 ENCOUNTER — TELEPHONE (OUTPATIENT)
Dept: PRIMARY CARE CLINIC | Age: 68
End: 2018-01-22

## 2018-01-22 LAB — INR BLD: 3.1

## 2018-01-23 ENCOUNTER — ANTI-COAG VISIT (OUTPATIENT)
Dept: PRIMARY CARE CLINIC | Age: 68
End: 2018-01-23

## 2018-01-23 NOTE — TELEPHONE ENCOUNTER
Writer called Lefty Neff on [facility] [address] and was told to call [phone]  Writer requested results to be faxed to our office

## 2018-01-23 NOTE — PROGRESS NOTES
PT from 1/22/18 34.4  INR from 1/22/18 3.1    Currently taking 3 mg alternating with 1.5mg    Last INR on 12/20/17 was 2.7    Please advise

## 2018-01-26 ENCOUNTER — TELEPHONE (OUTPATIENT)
Dept: PRIMARY CARE CLINIC | Age: 68
End: 2018-01-26

## 2018-02-01 ENCOUNTER — OFFICE VISIT (OUTPATIENT)
Dept: PRIMARY CARE CLINIC | Age: 68
End: 2018-02-01
Payer: MEDICARE

## 2018-02-01 VITALS
RESPIRATION RATE: 22 BRPM | HEART RATE: 116 BPM | BODY MASS INDEX: 32.57 KG/M2 | HEIGHT: 63 IN | SYSTOLIC BLOOD PRESSURE: 140 MMHG | DIASTOLIC BLOOD PRESSURE: 86 MMHG | OXYGEN SATURATION: 95 % | WEIGHT: 183.8 LBS

## 2018-02-01 DIAGNOSIS — D68.9 COAGULOPATHY (HCC): ICD-10-CM

## 2018-02-01 DIAGNOSIS — M19.91 PRIMARY OSTEOARTHRITIS, UNSPECIFIED SITE: Primary | ICD-10-CM

## 2018-02-01 DIAGNOSIS — G25.81 RESTLESS LEG SYNDROME: ICD-10-CM

## 2018-02-01 PROCEDURE — 3017F COLORECTAL CA SCREEN DOC REV: CPT | Performed by: NURSE PRACTITIONER

## 2018-02-01 PROCEDURE — 1123F ACP DISCUSS/DSCN MKR DOCD: CPT | Performed by: NURSE PRACTITIONER

## 2018-02-01 PROCEDURE — G8484 FLU IMMUNIZE NO ADMIN: HCPCS | Performed by: NURSE PRACTITIONER

## 2018-02-01 PROCEDURE — 99214 OFFICE O/P EST MOD 30 MIN: CPT | Performed by: NURSE PRACTITIONER

## 2018-02-01 PROCEDURE — 1090F PRES/ABSN URINE INCON ASSESS: CPT | Performed by: NURSE PRACTITIONER

## 2018-02-01 PROCEDURE — G8400 PT W/DXA NO RESULTS DOC: HCPCS | Performed by: NURSE PRACTITIONER

## 2018-02-01 PROCEDURE — 1036F TOBACCO NON-USER: CPT | Performed by: NURSE PRACTITIONER

## 2018-02-01 PROCEDURE — G8417 CALC BMI ABV UP PARAM F/U: HCPCS | Performed by: NURSE PRACTITIONER

## 2018-02-01 PROCEDURE — G8427 DOCREV CUR MEDS BY ELIG CLIN: HCPCS | Performed by: NURSE PRACTITIONER

## 2018-02-01 PROCEDURE — 3014F SCREEN MAMMO DOC REV: CPT | Performed by: NURSE PRACTITIONER

## 2018-02-01 PROCEDURE — 4040F PNEUMOC VAC/ADMIN/RCVD: CPT | Performed by: NURSE PRACTITIONER

## 2018-02-01 RX ORDER — ROPINIROLE 0.5 MG/1
0.5 TABLET, FILM COATED ORAL 3 TIMES DAILY
Qty: 90 TABLET | Refills: 3 | Status: SHIPPED | OUTPATIENT
Start: 2018-02-01 | End: 2020-08-24

## 2018-02-01 RX ORDER — OXYCODONE HYDROCHLORIDE AND ACETAMINOPHEN 5; 325 MG/1; MG/1
1-2 TABLET ORAL EVERY 4 HOURS PRN
Qty: 60 TABLET | Refills: 0 | Status: SHIPPED | OUTPATIENT
Start: 2018-02-01 | End: 2018-02-12 | Stop reason: ALTCHOICE

## 2018-02-01 RX ORDER — OXYCODONE HYDROCHLORIDE AND ACETAMINOPHEN 5; 325 MG/1; MG/1
1-2 TABLET ORAL EVERY 4 HOURS PRN
Qty: 60 TABLET | Refills: 0 | Status: SHIPPED | OUTPATIENT
Start: 2018-02-01 | End: 2018-02-01 | Stop reason: SDUPTHER

## 2018-02-01 ASSESSMENT — ENCOUNTER SYMPTOMS
ABDOMINAL PAIN: 0
SHORTNESS OF BREATH: 0
BACK PAIN: 0
COUGH: 0

## 2018-02-01 NOTE — PROGRESS NOTES
[facility]  [facility] INTERNAL MEDICINE  [address]  [address]  [place] [address]  Dept: [phone]  Dept Fax: [phone]    Aramis Wilde is a 75 y.o. female who presents today for her medical conditions/complaints as noted below. Aramis Wilde is c/o of Joint Pain (all over,not sleeping )      HPI:     Increase in arthritic pain  Begins in right shoulder, has also expanded to left shoulder, neck, bilateral hips and knees  Taking up to 8 tylenol arthritis daily, feels this may have increased her INR  Unable to sleep at night d/t pain  Pain has been going on for the last 5/6 weeks  Worse with colder weather  Asking for strongest pain medication  Not as mobile at home, difficult to move d/t pain  Also requesting referral to rheumatology        Past Medical History:   Diagnosis Date    Chronic kidney disease     Diabetes [facility] [address]     Hyperlipidemia     Hypertension     Neuropathy ([facility] [address] [facility]     Osteoarthritis     Pulmonary embolism ([facility] [address]     2014    Type 2 diabetes mellitus without complication ([facility] [address]       Past Surgical History:   Procedure Laterality Date    BREAST SURGERY      COLONOSCOPY      FOOT SURGERY      JOINT REPLACEMENT         Family History   Problem Relation Age of Onset    Kidney Disease Mother     Cancer Father     Arthritis Brother        Social History   Substance Use Topics    Smoking status: Never Smoker    Smokeless tobacco: Never Used    Alcohol use No      Current Outpatient Prescriptions   Medication Sig Dispense Refill    rOPINIRole (REQUIP) 0.5 MG tablet Take 1 tablet by mouth 3 times daily 90 tablet 3    oxyCODONE-acetaminophen (PERCOCET) 5-325 MG per tablet Take 1-2 tablets by mouth every 4 hours as needed for Pain for up to 14 days.  60 tablet 0    LORazepam (ATIVAN) 0.5 MG tablet TAKE ONE TABLET BY MOUTH EVERY 12 HOURS AS NEEDED FOR ANXIETY 60 tablet 0    metFORMIN (GLUCOPHAGE-XR) 500 MG extended release tablet Take 1 tablet by mouth 2 times daily Completed       Subjective:      Review of Systems   Constitutional: Negative for chills, fatigue and fever. HENT: Negative for congestion. Respiratory: Negative for cough and shortness of breath. Cardiovascular: Negative for chest pain and palpitations. Gastrointestinal: Negative for abdominal pain. Genitourinary: Negative for dysuria. Musculoskeletal: Positive for arthralgias. Negative for back pain. Neurological: Negative for dizziness and numbness. Psychiatric/Behavioral: Positive for sleep disturbance. Negative for self-injury and suicidal ideas. The patient is not nervous/anxious. Objective:     Physical Exam   Constitutional: She is oriented to person, place, and time. She appears well-developed and well-nourished. HENT:   Head: Normocephalic and atraumatic. Eyes: Pupils are equal, round, and reactive to light. Neck: Normal range of motion. Neck supple. Cardiovascular: Normal rate, regular rhythm and normal heart sounds. Pulmonary/Chest: Effort normal and breath sounds normal.   Abdominal: Soft. Bowel sounds are normal. There is no tenderness. Musculoskeletal:        Right shoulder: She exhibits decreased range of motion and tenderness. Left shoulder: She exhibits tenderness. Right hip: She exhibits tenderness. Left hip: She exhibits tenderness. Right knee: Tenderness found. Left knee: Tenderness found. Neurological: She is alert and oriented to person, place, and time. Skin: Skin is warm and dry. Psychiatric: She has a normal mood and affect. Her behavior is normal. Judgment and thought content normal.   Nursing note and vitals reviewed. BP (!) 140/86   Pulse 116   Resp 22   Ht 5' 3\" (1.6 m)   Wt 183 lb 12.8 oz (83.4 kg)   SpO2 95%   BMI 32.56 kg/m²     Assessment:      1.  Primary osteoarthritis, unspecified site  [facility] MD    oxyCODONE-acetaminophen [facility]) 5-325 MG per tablet    DISCONTINUED: oxyCODONE-acetaminophen (PERCOCET) 5-325 MG per tablet   2. Restless leg syndrome  rOPINIRole (REQUIP) 0.5 MG tablet   3. Coagulopathy ([address]         Plan:      Return if symptoms worsen or fail to improve. 1. Osteoarthritis- D/c tylenol arthritis at this time. Rx given for percocet with instruction for use. Rx given for referral to rheumatology. Follow up as needed. 2. RLS- Rx given for requip, she has used this in the past. Follow up as needed. Of the 25 minute duration appointment visit, Angelica Oconnell [facility] spent at least 50% of the face-to-face time in counseling, explanation of diagnosis, planning of further management, and answering all questions. Orders Placed This Encounter   Procedures    [facility] Panchal MD     Referral Priority:   Routine     Referral Type:   Consult for Advice and Opinion     Referral Reason:   Specialty Services Required     Referred to Provider:   Gerard Rivera MD     Requested Specialty:   Rheumatology     Number of Visits Requested:   1     Orders Placed This Encounter   Medications    DISCONTD: oxyCODONE-acetaminophen (PERCOCET) 5-325 MG per tablet     Sig: Take 1-2 tablets by mouth every 4 hours as needed for Pain for up to 14 days. Dispense:  60 tablet     Refill:  0    rOPINIRole (REQUIP) 0.5 MG tablet     Sig: Take 1 tablet by mouth 3 times daily     Dispense:  90 tablet     Refill:  3    oxyCODONE-acetaminophen (PERCOCET) 5-325 MG per tablet     Sig: Take 1-2 tablets by mouth every 4 hours as needed for Pain for up to 14 days. Dispense:  60 tablet     Refill:  0       Patient given educational materials - see patient instructions. Discussed use, benefit, and side effects of prescribed medications. All patient questions answered. Pt voiced understanding. Reviewed health maintenance. Instructed to continue current medications, diet and exercise.   Patient

## 2018-02-07 ENCOUNTER — TELEPHONE (OUTPATIENT)
Dept: PRIMARY CARE CLINIC | Age: 68
End: 2018-02-07

## 2018-02-07 NOTE — TELEPHONE ENCOUNTER
Patient's  called stating that his wife went to [facility] ED but refused to be admitted signed a AMA and left. Patient is home now but  is worried about the risks of her refusing treatment. Chhaya Gonzales wanted her to get blood work done in two days for kidney function and hemoglobin he would like you to create orders if you can for that. He was wondering if you could call him when you are free to discuss this. I advised him I would send this message. Please advise. Thank you.

## 2018-02-12 ENCOUNTER — OFFICE VISIT (OUTPATIENT)
Dept: PRIMARY CARE CLINIC | Age: 68
End: 2018-02-12
Payer: MEDICARE

## 2018-02-12 VITALS
DIASTOLIC BLOOD PRESSURE: 86 MMHG | SYSTOLIC BLOOD PRESSURE: 134 MMHG | OXYGEN SATURATION: 96 % | RESPIRATION RATE: 17 BRPM | HEIGHT: 63 IN | HEART RATE: 129 BPM | BODY MASS INDEX: 31.71 KG/M2 | WEIGHT: 179 LBS

## 2018-02-12 DIAGNOSIS — G25.81 RESTLESS LEG SYNDROME: ICD-10-CM

## 2018-02-12 DIAGNOSIS — E86.0 DEHYDRATION: Primary | ICD-10-CM

## 2018-02-12 DIAGNOSIS — F41.9 ANXIETY: ICD-10-CM

## 2018-02-12 DIAGNOSIS — E78.2 MIXED HYPERLIPIDEMIA: Primary | ICD-10-CM

## 2018-02-12 DIAGNOSIS — E11.65 TYPE 2 DIABETES MELLITUS WITH HYPERGLYCEMIA, WITHOUT LONG-TERM CURRENT USE OF INSULIN (HCC): ICD-10-CM

## 2018-02-12 DIAGNOSIS — D64.9 ANEMIA, UNSPECIFIED TYPE: ICD-10-CM

## 2018-02-12 DIAGNOSIS — I10 ESSENTIAL (PRIMARY) HYPERTENSION: ICD-10-CM

## 2018-02-12 PROCEDURE — 1036F TOBACCO NON-USER: CPT | Performed by: NURSE PRACTITIONER

## 2018-02-12 PROCEDURE — 3017F COLORECTAL CA SCREEN DOC REV: CPT | Performed by: NURSE PRACTITIONER

## 2018-02-12 PROCEDURE — 1123F ACP DISCUSS/DSCN MKR DOCD: CPT | Performed by: NURSE PRACTITIONER

## 2018-02-12 PROCEDURE — 1090F PRES/ABSN URINE INCON ASSESS: CPT | Performed by: NURSE PRACTITIONER

## 2018-02-12 PROCEDURE — G8417 CALC BMI ABV UP PARAM F/U: HCPCS | Performed by: NURSE PRACTITIONER

## 2018-02-12 PROCEDURE — 3046F HEMOGLOBIN A1C LEVEL >9.0%: CPT | Performed by: NURSE PRACTITIONER

## 2018-02-12 PROCEDURE — 3014F SCREEN MAMMO DOC REV: CPT | Performed by: NURSE PRACTITIONER

## 2018-02-12 PROCEDURE — G8484 FLU IMMUNIZE NO ADMIN: HCPCS | Performed by: NURSE PRACTITIONER

## 2018-02-12 PROCEDURE — G8400 PT W/DXA NO RESULTS DOC: HCPCS | Performed by: NURSE PRACTITIONER

## 2018-02-12 PROCEDURE — 99215 OFFICE O/P EST HI 40 MIN: CPT | Performed by: NURSE PRACTITIONER

## 2018-02-12 PROCEDURE — G8427 DOCREV CUR MEDS BY ELIG CLIN: HCPCS | Performed by: NURSE PRACTITIONER

## 2018-02-12 PROCEDURE — 4040F PNEUMOC VAC/ADMIN/RCVD: CPT | Performed by: NURSE PRACTITIONER

## 2018-02-12 RX ORDER — FAMOTIDINE 20 MG/1
20 TABLET, FILM COATED ORAL 2 TIMES DAILY
Qty: 60 TABLET | Refills: 0 | Status: SHIPPED | OUTPATIENT
Start: 2018-02-12 | End: 2018-03-19 | Stop reason: ALTCHOICE

## 2018-02-12 RX ORDER — GLIMEPIRIDE 4 MG/1
4 TABLET ORAL 2 TIMES DAILY
Qty: 180 TABLET | Refills: 1 | Status: ON HOLD | OUTPATIENT
Start: 2018-02-12 | End: 2018-02-19 | Stop reason: HOSPADM

## 2018-02-12 RX ORDER — DOCUSATE SODIUM 100 MG/1
100 CAPSULE, LIQUID FILLED ORAL 2 TIMES DAILY
Qty: 30 CAPSULE | Refills: 0 | Status: SHIPPED | OUTPATIENT
Start: 2018-02-12 | End: 2019-05-28

## 2018-02-12 ASSESSMENT — PATIENT HEALTH QUESTIONNAIRE - PHQ9
2. FEELING DOWN, DEPRESSED OR HOPELESS: 0
SUM OF ALL RESPONSES TO PHQ QUESTIONS 1-9: 0
SUM OF ALL RESPONSES TO PHQ9 QUESTIONS 1 & 2: 0
1. LITTLE INTEREST OR PLEASURE IN DOING THINGS: 0

## 2018-02-12 ASSESSMENT — ENCOUNTER SYMPTOMS
ABDOMINAL PAIN: 0
BACK PAIN: 0
SHORTNESS OF BREATH: 0
COUGH: 0

## 2018-02-12 NOTE — PROGRESS NOTES
tablet 0    metFORMIN (GLUCOPHAGE-XR) 500 MG extended release tablet Take 1 tablet by mouth 2 times daily (before meals) Take 500 mg a day for 2 weeks, then increase the dose to twice a day. 60 tablet 11    warfarin (COUMADIN) 3 MG tablet Take 1 tablet by mouth daily 90 tablet 1    lisinopril-hydrochlorothiazide (PRINZIDE;ZESTORETIC) 20-25 MG per tablet Take 1 tablet by mouth daily 90 tablet 3    atorvastatin (LIPITOR) 20 MG tablet Take 1 tablet by mouth daily 90 tablet 3    Accu-Chek Softclix Lancets MISC Use 2 TIMES DAILY. Dx: E11.22, N18.3. Type II  each 3    Glucose Blood (BLOOD GLUCOSE TEST STRIPS) STRP Test_2__times daily    Diagnosis: 250.0   Diabetes Mellitus______Non-Insulin Dependent 100 strip 11    Alcohol Swabs PADS Need to use  twice a day 100 each 3    Blood Glucose Monitoring Suppl FLAVIA Glucometer Accu check. Test Blood Sugars 2 Times Daily. Dx: E 11.22, N 18.3 type II DM 1 Device 0    omeprazole (PRILOSEC) 20 MG delayed release capsule Take 1 capsule by mouth Daily 90 capsule 3    acetaminophen (TYLENOL) 500 MG tablet Take 1,000 mg by mouth every 6 hours as needed for Pain      diphenhydrAMINE (ALLERGY RELIEF) 25 MG tablet Take 25 mg by mouth every 6 hours as needed for Itching       No current facility-administered medications for this visit.       No Known Allergies    Health Maintenance   Topic Date Due    Hepatitis C screen  1950    Diabetic microalbuminuria test  09/28/1967    DTaP/Tdap/Td vaccine (1 - Tdap) 09/28/1968    Breast cancer screen  09/28/1999    Colon cancer screen colonoscopy  09/28/1999    Zostavax vaccine  09/28/2009    DEXA (modify frequency per FRAX score)  09/28/2014    Pneumococcal low/med risk (1 of 2 - PCV13) 09/28/2014    Potassium monitoring  08/07/2018    Creatinine monitoring  08/07/2018    Diabetic foot exam  11/13/2018    A1C test (Diabetic or Prediabetic)  11/13/2018    Lipid screen  11/13/2018    Diabetic retinal exam  12/11/2018 to iron deficiency, would not start her iron supplement at this time d/t constipation. Rx given for colace. Repeat CBC and iron levels in one week. Advised to seek ER for any dizziness/weakness/blood in stool/changes/concerns. Patient and  state an understanding. Of the 40 minute duration appointment visit, Sincere Youssef [facility] spent at least 50% of the face-to-face time in counseling, explanation of diagnosis, planning of further management, and answering all questions. Orders Placed This Encounter   Procedures    Comprehensive Metabolic Panel     Standing Status:   Future     Standing Expiration Date:   2/13/2019    CBC     Standing Status:   Future     Standing Expiration Date:   2/12/2019    Iron And TIBC     Standing Status:   Future     Standing Expiration Date:   2/12/2019     Order Specific Question:   Is Patient Fasting? Answer:   no     Order Specific Question:   No of Hours? Answer:   na     Orders Placed This Encounter   Medications    docusate sodium (COLACE) 100 MG capsule     Sig: Take 1 capsule by mouth 2 times daily     Dispense:  30 capsule     Refill:  0    famotidine (PEPCID) 20 MG tablet     Sig: Take 1 tablet by mouth 2 times daily     Dispense:  60 tablet     Refill:  0    glimepiride (AMARYL) 4 MG tablet     Sig: Take 1 tablet by mouth 2 times daily     Dispense:  180 tablet     Refill:  1       Patient given educational materials - see patient instructions. Discussed use, benefit, and side effects of prescribed medications. All patient questions answered. Pt voiced understanding. Reviewed health maintenance. Instructed to continue current medications, diet and exercise. Patient agreed with treatment plan. Follow up as directed.       Electronically signed by Vadim Cardona CNP on 2/12/2018 at 4:12 PM

## 2018-02-13 ENCOUNTER — TELEPHONE (OUTPATIENT)
Dept: PRIMARY CARE CLINIC | Age: 68
End: 2018-02-13

## 2018-02-13 DIAGNOSIS — E11.9 TYPE 2 DIABETES MELLITUS WITHOUT COMPLICATION, WITHOUT LONG-TERM CURRENT USE OF INSULIN (HCC): ICD-10-CM

## 2018-02-13 DIAGNOSIS — R33.8 ACUTE RETENTION OF URINE: ICD-10-CM

## 2018-02-13 DIAGNOSIS — E86.0 DEHYDRATION: ICD-10-CM

## 2018-02-13 DIAGNOSIS — R30.0 DYSURIA: Primary | ICD-10-CM

## 2018-02-13 LAB
CREATININE URINE: 150.46 MG/DL
MICROALBUMIN/CREAT 24H UR: 2.9 MG/G{CREAT}

## 2018-02-13 PROCEDURE — 81003 URINALYSIS AUTO W/O SCOPE: CPT | Performed by: NURSE PRACTITIONER

## 2018-02-14 ENCOUNTER — TELEPHONE (OUTPATIENT)
Dept: PRIMARY CARE CLINIC | Age: 68
End: 2018-02-14

## 2018-02-14 RX ORDER — CIPROFLOXACIN 500 MG/1
500 TABLET, FILM COATED ORAL 2 TIMES DAILY
Qty: 20 TABLET | Refills: 0 | Status: ON HOLD | OUTPATIENT
Start: 2018-02-14 | End: 2018-02-19 | Stop reason: HOSPADM

## 2018-02-14 NOTE — TELEPHONE ENCOUNTER
All antibiotics are going to have the warning with coumadin. Advised patient that this may alter her INR.  She has a UTI and needs an antibiotic. thanks

## 2018-02-17 ENCOUNTER — APPOINTMENT (OUTPATIENT)
Dept: GENERAL RADIOLOGY | Age: 68
DRG: 871 | End: 2018-02-17
Payer: MEDICARE

## 2018-02-17 ENCOUNTER — HOSPITAL ENCOUNTER (INPATIENT)
Age: 68
LOS: 2 days | Discharge: HOME | DRG: 871 | End: 2018-02-19
Attending: EMERGENCY MEDICINE | Admitting: INTERNAL MEDICINE
Payer: MEDICARE

## 2018-02-17 DIAGNOSIS — A41.9 SEPSIS, DUE TO UNSPECIFIED ORGANISM: ICD-10-CM

## 2018-02-17 DIAGNOSIS — R79.1 SUBTHERAPEUTIC INTERNATIONAL NORMALIZED RATIO (INR): ICD-10-CM

## 2018-02-17 DIAGNOSIS — J18.9 PNEUMONIA DUE TO ORGANISM: Primary | ICD-10-CM

## 2018-02-17 DIAGNOSIS — Z86.711 HX OF PULMONARY EMBOLUS: ICD-10-CM

## 2018-02-17 DIAGNOSIS — E86.0 DEHYDRATION: ICD-10-CM

## 2018-02-17 PROBLEM — N39.0 UTI (URINARY TRACT INFECTION): Status: ACTIVE | Noted: 2018-02-17

## 2018-02-17 PROBLEM — N17.9 AKI (ACUTE KIDNEY INJURY) (HCC): Status: ACTIVE | Noted: 2018-02-17

## 2018-02-17 PROBLEM — K59.00 CONSTIPATION: Status: ACTIVE | Noted: 2018-02-17

## 2018-02-17 LAB
ABSOLUTE EOS #: 0.13 K/UL (ref 0–0.4)
ABSOLUTE IMMATURE GRANULOCYTE: ABNORMAL K/UL (ref 0–0.3)
ABSOLUTE LYMPH #: 0.93 K/UL (ref 1–4.8)
ABSOLUTE MONO #: 1.2 K/UL (ref 0.1–1.3)
ALBUMIN SERPL-MCNC: 3.7 G/DL (ref 3.5–5.2)
ALBUMIN/GLOBULIN RATIO: ABNORMAL (ref 1–2.5)
ALP BLD-CCNC: 72 U/L (ref 35–104)
ALT SERPL-CCNC: 16 U/L (ref 5–33)
ANION GAP SERPL CALCULATED.3IONS-SCNC: 17 MMOL/L (ref 9–17)
AST SERPL-CCNC: 14 U/L
BASOPHILS # BLD: 1 % (ref 0–2)
BASOPHILS ABSOLUTE: 0.13 K/UL (ref 0–0.2)
BILIRUB SERPL-MCNC: 0.25 MG/DL (ref 0.3–1.2)
BNP INTERPRETATION: ABNORMAL
BUN BLDV-MCNC: 21 MG/DL (ref 8–23)
BUN/CREAT BLD: ABNORMAL (ref 9–20)
CALCIUM SERPL-MCNC: 9.8 MG/DL (ref 8.6–10.4)
CHLORIDE BLD-SCNC: 98 MMOL/L (ref 98–107)
CO2: 21 MMOL/L (ref 20–31)
CREAT SERPL-MCNC: 1.43 MG/DL (ref 0.5–0.9)
DIFFERENTIAL TYPE: ABNORMAL
EOSINOPHILS RELATIVE PERCENT: 1 % (ref 0–4)
GFR AFRICAN AMERICAN: 44 ML/MIN
GFR NON-AFRICAN AMERICAN: 36 ML/MIN
GFR SERPL CREATININE-BSD FRML MDRD: ABNORMAL ML/MIN/{1.73_M2}
GFR SERPL CREATININE-BSD FRML MDRD: ABNORMAL ML/MIN/{1.73_M2}
GLUCOSE BLD-MCNC: 155 MG/DL (ref 65–105)
GLUCOSE BLD-MCNC: 169 MG/DL (ref 70–99)
HCT VFR BLD CALC: 29.5 % (ref 36–46)
HEMOGLOBIN: 9.1 G/DL (ref 12–16)
IMMATURE GRANULOCYTES: ABNORMAL %
INR BLD: 1.2
LACTIC ACID, SEPSIS WHOLE BLOOD: NORMAL MMOL/L (ref 0.5–1.9)
LACTIC ACID, SEPSIS: 1.6 MMOL/L (ref 0.5–1.9)
LACTIC ACID: 1.3 MMOL/L (ref 0.5–2.2)
LACTIC ACID: 2.4 MMOL/L (ref 0.5–2.2)
LYMPHOCYTES # BLD: 7 % (ref 24–44)
MCH RBC QN AUTO: 24.2 PG (ref 26–34)
MCHC RBC AUTO-ENTMCNC: 30.9 G/DL (ref 31–37)
MCV RBC AUTO: 78.5 FL (ref 80–100)
MONOCYTES # BLD: 9 % (ref 1–7)
MORPHOLOGY: ABNORMAL
NRBC AUTOMATED: ABNORMAL PER 100 WBC
PARTIAL THROMBOPLASTIN TIME: 28.8 SEC (ref 23–31)
PDW BLD-RTO: 17.2 % (ref 11.5–14.9)
PLATELET # BLD: 570 K/UL (ref 150–450)
PLATELET ESTIMATE: ABNORMAL
PMV BLD AUTO: 7.4 FL (ref 6–12)
POTASSIUM SERPL-SCNC: 4.4 MMOL/L (ref 3.7–5.3)
PRO-BNP: 302 PG/ML
PROCALCITONIN: 0.16 NG/ML
PROTHROMBIN TIME: 12.7 SEC (ref 9.7–12)
RBC # BLD: 3.75 M/UL (ref 4–5.2)
RBC # BLD: ABNORMAL 10*6/UL
SEG NEUTROPHILS: 82 % (ref 36–66)
SEGMENTED NEUTROPHILS ABSOLUTE COUNT: 10.91 K/UL (ref 1.3–9.1)
SODIUM BLD-SCNC: 136 MMOL/L (ref 135–144)
TOTAL PROTEIN: 7.4 G/DL (ref 6.4–8.3)
WBC # BLD: 13.3 K/UL (ref 3.5–11)
WBC # BLD: ABNORMAL 10*3/UL

## 2018-02-17 PROCEDURE — 83605 ASSAY OF LACTIC ACID: CPT

## 2018-02-17 PROCEDURE — 87040 BLOOD CULTURE FOR BACTERIA: CPT

## 2018-02-17 PROCEDURE — 36415 COLL VENOUS BLD VENIPUNCTURE: CPT

## 2018-02-17 PROCEDURE — 85025 COMPLETE CBC W/AUTO DIFF WBC: CPT

## 2018-02-17 PROCEDURE — 80053 COMPREHEN METABOLIC PANEL: CPT

## 2018-02-17 PROCEDURE — 6360000002 HC RX W HCPCS: Performed by: FAMILY MEDICINE

## 2018-02-17 PROCEDURE — 6370000000 HC RX 637 (ALT 250 FOR IP): Performed by: INTERNAL MEDICINE

## 2018-02-17 PROCEDURE — 94664 DEMO&/EVAL PT USE INHALER: CPT

## 2018-02-17 PROCEDURE — S0028 INJECTION, FAMOTIDINE, 20 MG: HCPCS | Performed by: FAMILY MEDICINE

## 2018-02-17 PROCEDURE — 2580000003 HC RX 258: Performed by: FAMILY MEDICINE

## 2018-02-17 PROCEDURE — 99285 EMERGENCY DEPT VISIT HI MDM: CPT

## 2018-02-17 PROCEDURE — 83880 ASSAY OF NATRIURETIC PEPTIDE: CPT

## 2018-02-17 PROCEDURE — 85610 PROTHROMBIN TIME: CPT

## 2018-02-17 PROCEDURE — 6360000002 HC RX W HCPCS: Performed by: EMERGENCY MEDICINE

## 2018-02-17 PROCEDURE — 82947 ASSAY GLUCOSE BLOOD QUANT: CPT

## 2018-02-17 PROCEDURE — 86738 MYCOPLASMA ANTIBODY: CPT

## 2018-02-17 PROCEDURE — 71046 X-RAY EXAM CHEST 2 VIEWS: CPT

## 2018-02-17 PROCEDURE — 6370000000 HC RX 637 (ALT 250 FOR IP): Performed by: FAMILY MEDICINE

## 2018-02-17 PROCEDURE — 2580000003 HC RX 258: Performed by: EMERGENCY MEDICINE

## 2018-02-17 PROCEDURE — 2500000003 HC RX 250 WO HCPCS: Performed by: FAMILY MEDICINE

## 2018-02-17 PROCEDURE — 96360 HYDRATION IV INFUSION INIT: CPT

## 2018-02-17 PROCEDURE — 85730 THROMBOPLASTIN TIME PARTIAL: CPT

## 2018-02-17 PROCEDURE — 84145 PROCALCITONIN (PCT): CPT

## 2018-02-17 PROCEDURE — 2060000000 HC ICU INTERMEDIATE R&B

## 2018-02-17 PROCEDURE — 6370000000 HC RX 637 (ALT 250 FOR IP): Performed by: EMERGENCY MEDICINE

## 2018-02-17 RX ORDER — HEPARIN SODIUM 10000 [USP'U]/100ML
12 INJECTION, SOLUTION INTRAVENOUS CONTINUOUS
Status: DISCONTINUED | OUTPATIENT
Start: 2018-02-17 | End: 2018-02-19

## 2018-02-17 RX ORDER — ACETAMINOPHEN 325 MG/1
650 TABLET ORAL EVERY 4 HOURS PRN
Status: DISCONTINUED | OUTPATIENT
Start: 2018-02-17 | End: 2018-02-17

## 2018-02-17 RX ORDER — DOCUSATE SODIUM 100 MG/1
100 CAPSULE, LIQUID FILLED ORAL 2 TIMES DAILY PRN
Status: DISCONTINUED | OUTPATIENT
Start: 2018-02-17 | End: 2018-02-19 | Stop reason: HOSPADM

## 2018-02-17 RX ORDER — LEVOFLOXACIN 5 MG/ML
750 INJECTION, SOLUTION INTRAVENOUS ONCE
Status: COMPLETED | OUTPATIENT
Start: 2018-02-17 | End: 2018-02-17

## 2018-02-17 RX ORDER — LEVOFLOXACIN 5 MG/ML
500 INJECTION, SOLUTION INTRAVENOUS EVERY 24 HOURS
Status: DISCONTINUED | OUTPATIENT
Start: 2018-02-18 | End: 2018-02-19 | Stop reason: HOSPADM

## 2018-02-17 RX ORDER — SODIUM CHLORIDE 0.9 % (FLUSH) 0.9 %
10 SYRINGE (ML) INJECTION PRN
Status: DISCONTINUED | OUTPATIENT
Start: 2018-02-17 | End: 2018-02-19 | Stop reason: HOSPADM

## 2018-02-17 RX ORDER — LORAZEPAM 0.5 MG/1
0.5 TABLET ORAL EVERY 12 HOURS PRN
Status: DISCONTINUED | OUTPATIENT
Start: 2018-02-17 | End: 2018-02-19 | Stop reason: HOSPADM

## 2018-02-17 RX ORDER — SODIUM CHLORIDE 0.9 % (FLUSH) 0.9 %
10 SYRINGE (ML) INJECTION EVERY 12 HOURS SCHEDULED
Status: DISCONTINUED | OUTPATIENT
Start: 2018-02-17 | End: 2018-02-19 | Stop reason: HOSPADM

## 2018-02-17 RX ORDER — LEVOFLOXACIN 5 MG/ML
500 INJECTION, SOLUTION INTRAVENOUS EVERY 24 HOURS
Status: DISCONTINUED | OUTPATIENT
Start: 2018-02-17 | End: 2018-02-17

## 2018-02-17 RX ORDER — ROPINIROLE 0.5 MG/1
0.5 TABLET, FILM COATED ORAL 3 TIMES DAILY
Status: DISCONTINUED | OUTPATIENT
Start: 2018-02-17 | End: 2018-02-19 | Stop reason: HOSPADM

## 2018-02-17 RX ORDER — ATORVASTATIN CALCIUM 20 MG/1
20 TABLET, FILM COATED ORAL DAILY
Status: DISCONTINUED | OUTPATIENT
Start: 2018-02-17 | End: 2018-02-19 | Stop reason: HOSPADM

## 2018-02-17 RX ORDER — ACETAMINOPHEN 500 MG
1000 TABLET ORAL EVERY 4 HOURS PRN
Status: DISCONTINUED | OUTPATIENT
Start: 2018-02-17 | End: 2018-02-18

## 2018-02-17 RX ORDER — POTASSIUM CHLORIDE 20MEQ/15ML
40 LIQUID (ML) ORAL PRN
Status: DISCONTINUED | OUTPATIENT
Start: 2018-02-17 | End: 2018-02-19 | Stop reason: HOSPADM

## 2018-02-17 RX ORDER — ONDANSETRON 2 MG/ML
4 INJECTION INTRAMUSCULAR; INTRAVENOUS EVERY 6 HOURS PRN
Status: DISCONTINUED | OUTPATIENT
Start: 2018-02-17 | End: 2018-02-19 | Stop reason: HOSPADM

## 2018-02-17 RX ORDER — LORAZEPAM 0.5 MG/1
0.5 TABLET ORAL EVERY 6 HOURS PRN
COMMUNITY
End: 2018-02-23 | Stop reason: SDUPTHER

## 2018-02-17 RX ORDER — BISACODYL 10 MG
10 SUPPOSITORY, RECTAL RECTAL ONCE
Status: COMPLETED | OUTPATIENT
Start: 2018-02-17 | End: 2018-02-17

## 2018-02-17 RX ORDER — DEXTROSE MONOHYDRATE 50 MG/ML
100 INJECTION, SOLUTION INTRAVENOUS PRN
Status: DISCONTINUED | OUTPATIENT
Start: 2018-02-17 | End: 2018-02-19 | Stop reason: HOSPADM

## 2018-02-17 RX ORDER — 0.9 % SODIUM CHLORIDE 0.9 %
1000 INTRAVENOUS SOLUTION INTRAVENOUS ONCE
Status: COMPLETED | OUTPATIENT
Start: 2018-02-17 | End: 2018-02-17

## 2018-02-17 RX ORDER — DEXTROSE MONOHYDRATE 25 G/50ML
12.5 INJECTION, SOLUTION INTRAVENOUS PRN
Status: DISCONTINUED | OUTPATIENT
Start: 2018-02-17 | End: 2018-02-19 | Stop reason: HOSPADM

## 2018-02-17 RX ORDER — WARFARIN SODIUM 5 MG/1
5 TABLET ORAL
Status: COMPLETED | OUTPATIENT
Start: 2018-02-17 | End: 2018-02-17

## 2018-02-17 RX ORDER — SENNOSIDES 8.6 MG
1300 CAPSULE ORAL 3 TIMES DAILY
COMMUNITY

## 2018-02-17 RX ORDER — POTASSIUM CHLORIDE 20 MEQ/1
40 TABLET, EXTENDED RELEASE ORAL PRN
Status: DISCONTINUED | OUTPATIENT
Start: 2018-02-17 | End: 2018-02-19 | Stop reason: HOSPADM

## 2018-02-17 RX ORDER — OSELTAMIVIR PHOSPHATE 75 MG/1
75 CAPSULE ORAL 2 TIMES DAILY
Status: CANCELLED | OUTPATIENT
Start: 2018-02-17 | End: 2018-02-22

## 2018-02-17 RX ORDER — POTASSIUM CHLORIDE 7.45 MG/ML
10 INJECTION INTRAVENOUS PRN
Status: DISCONTINUED | OUTPATIENT
Start: 2018-02-17 | End: 2018-02-19 | Stop reason: HOSPADM

## 2018-02-17 RX ORDER — NICOTINE POLACRILEX 4 MG
15 LOZENGE BUCCAL PRN
Status: DISCONTINUED | OUTPATIENT
Start: 2018-02-17 | End: 2018-02-19 | Stop reason: HOSPADM

## 2018-02-17 RX ADMIN — SODIUM CHLORIDE 1000 ML: 9 INJECTION, SOLUTION INTRAVENOUS at 16:37

## 2018-02-17 RX ADMIN — WARFARIN SODIUM 5 MG: 5 TABLET ORAL at 23:32

## 2018-02-17 RX ADMIN — ROPINIROLE 0.5 MG: 0.5 TABLET, FILM COATED ORAL at 23:31

## 2018-02-17 RX ADMIN — Medication 10 ML: at 23:32

## 2018-02-17 RX ADMIN — HEPARIN SODIUM AND DEXTROSE 12 UNITS/KG/HR: 10000; 5 INJECTION INTRAVENOUS at 21:57

## 2018-02-17 RX ADMIN — BISACODYL 10 MG: 10 SUPPOSITORY RECTAL at 16:40

## 2018-02-17 RX ADMIN — DOCUSATE SODIUM 100 MG: 100 CAPSULE, LIQUID FILLED ORAL at 23:32

## 2018-02-17 RX ADMIN — FAMOTIDINE 20 MG: 10 INJECTION, SOLUTION INTRAVENOUS at 23:33

## 2018-02-17 RX ADMIN — LORAZEPAM 0.5 MG: 0.5 TABLET ORAL at 23:35

## 2018-02-17 RX ADMIN — LEVOFLOXACIN 750 MG: 5 INJECTION, SOLUTION INTRAVENOUS at 18:52

## 2018-02-17 RX ADMIN — CEFTRIAXONE SODIUM 1 G: 1 INJECTION, POWDER, FOR SOLUTION INTRAMUSCULAR; INTRAVENOUS at 18:35

## 2018-02-17 RX ADMIN — ACETAMINOPHEN 1000 MG: 500 TABLET ORAL at 23:35

## 2018-02-17 ASSESSMENT — ENCOUNTER SYMPTOMS
ABDOMINAL PAIN: 0
SPUTUM PRODUCTION: 0
SHORTNESS OF BREATH: 1
NAUSEA: 0
CONSTIPATION: 1
COUGH: 0
DIARRHEA: 0
VOMITING: 0
WHEEZING: 0

## 2018-02-17 ASSESSMENT — PAIN DESCRIPTION - PAIN TYPE
TYPE: ACUTE PAIN
TYPE: ACUTE PAIN

## 2018-02-17 ASSESSMENT — PAIN SCALES - GENERAL
PAINLEVEL_OUTOF10: 10
PAINLEVEL_OUTOF10: 10

## 2018-02-17 ASSESSMENT — PAIN DESCRIPTION - LOCATION: LOCATION: HEAD

## 2018-02-17 ASSESSMENT — PAIN DESCRIPTION - FREQUENCY: FREQUENCY: CONTINUOUS

## 2018-02-17 ASSESSMENT — PAIN DESCRIPTION - DESCRIPTORS
DESCRIPTORS: ACHING
DESCRIPTORS: ACHING

## 2018-02-17 NOTE — ED PROVIDER NOTES
[facility]     Emergency Department     Faculty Attestation    I performed a history and physical examination of the patient and discussed management with the resident. I reviewed the residents note and agree with the documented findings and plan of care. Any areas of disagreement are noted on the chart. I was personally present for the key portions of any procedures. I have documented in the chart those procedures where I was not present during the key portions. I have reviewed the emergency nurses triage note. I agree with the chief complaint, past medical history, past surgical history, allergies, medications, social and family history as documented unless otherwise noted below. Documentation of the HPI, Physical Exam and Medical Decision Making performed by medical students or scribes is based on my personal performance of the HPI, PE and MDM. For APC cases, I have personally evaluated and examined the patient in conjunction with the APC and agree with the assessment, treatment plan, and disposition of the patient as recorded by the APCAdditional findings are as noted.       CHIEF COMPLAINT       Chief Complaint   Patient presents with    Dehydration    Shortness of Breath    Constipation       RECENT VITALS:   /79   Pulse 127   Temp 97.4 °F (36.3 °C) (Oral)   Resp 30   Ht 5' 3\" (1.6 m)   Wt 174 lb (78.9 kg)   SpO2 96%   BMI 30.82 kg/m²       PERTINENT ATTENDING PHYSICIAN COMMENTS:            Marycruz Sampson MD, FRANCY, [address] [facility]  Attending Emergency Physician           Marycruz Sampson MD  02/17/18 [id number]

## 2018-02-17 NOTE — H&P
[facility]    HISTORY AND PHYSICAL EXAMINATION            Date:   2/17/2018  Patient name:  Sera Mccann  Date of admission:  2/17/2018  3:24 PM  MRN:   [id number]  Account:  [de-identified]  YOB: 1950  PCP:    Rylan Nathan CNP  Room:   15/15  Code Status:    No Order    Chief Complaint:     Chief Complaint   Patient presents with    Dehydration    Shortness of Breath    Constipation     History Obtained From:     patient, spouse, electronic medical record    History of Present Illness: The patient is a 75 y.o. Unavailable/unknown female who presents with Dehydration; Shortness of Breath; and Constipation   and she is admitted to the hospital for the management of  Sepsis. Patient with a history or PE on coumadin, vocal cord paralysis, T2DM, arthritis, and iron deficiency anemia who presents with worsening body aches, SOB and generalized weakness. Symptoms started 4-5 weeks ago, have been worsening. She was recently seen by PCP, +UTI, started on PO Cipro. She denies CP, cough, n/v, abdominal pain, focal weakness. She does complain of constipation, had been on Percocet for arthritis, but that was stopped 2 weeks ago, now taking 6-650 mg Tylenol daily. In ER, patient afebrile, but tachypnic, tachycardic, O2 saturation wnl on RA. WBC 13K, HGB 9.1, Cr 1.43, INR 1.2, Lactic acid 2.4. Cultures drawn. Patient given IVF bolus, rocephin and levaquin.      Past Medical History:     Past Medical History:   Diagnosis Date    Chronic kidney disease     Diabetes ([facility] [address]     Hyperlipidemia     Hypertension     Neuropathy ([facility] [address]     Osteoarthritis     Pulmonary embolism ([facility] [address]     2014    Type 2 diabetes mellitus without complication (HCC)         Past Surgical History:     Past Surgical History:   Procedure Laterality Date    BREAST SURGERY      COLONOSCOPY      FOOT SURGERY      JOINT REPLACEMENT          Medications Prior to Admission:     Prior to Admission medications    Medication Sig Start Date End Date Taking? Authorizing Provider   ciprofloxacin (CIPRO) 500 MG tablet Take 1 tablet by mouth 2 times daily for 10 days 2/14/18 2/24/18  Matt Long CNP   docusate sodium (COLACE) 100 MG capsule Take 1 capsule by mouth 2 times daily 2/12/18   Matt Long CNP   famotidine (PEPCID) 20 MG tablet Take 1 tablet by mouth 2 times daily 2/12/18   Matt Long CNP   glimepiride (AMARYL) 4 MG tablet Take 1 tablet by mouth 2 times daily 2/12/18   Matt Long CNP   rOPINIRole (REQUIP) 0.5 MG tablet Take 1 tablet by mouth 3 times daily 2/1/18   Matt Long CNP   metFORMIN (GLUCOPHAGE-XR) 500 MG extended release tablet Take 1 tablet by mouth 2 times daily (before meals) Take 500 mg a day for 2 weeks, then increase the dose to twice a day. 9/25/17   Matt Long CNP   warfarin (COUMADIN) 3 MG tablet Take 1 tablet by mouth daily 8/14/17   Matt Long CNP   lisinopril-hydrochlorothiazide (PRINZIDE;ZESTORETIC) 20-25 MG per tablet Take 1 tablet by mouth daily 8/7/17   Matt Long CNP   atorvastatin (LIPITOR) 20 MG tablet Take 1 tablet by mouth daily 8/7/17   Matt Long CNP   Accu-Chek Softclix Lancets MISC Use 2 TIMES DAILY. Dx: E11.22, N18.3. Type II DM 6/22/17   Roge Guevara MD   Glucose Blood (BLOOD GLUCOSE TEST STRIPS) STRP Test_2__times daily    Diagnosis: 250.0   Diabetes Mellitus______Non-Insulin Dependent 6/21/17   Roge Guevara MD   Alcohol Swabs PADS Need to use  twice a day 6/21/17   Roge Guevara MD   Blood Glucose Monitoring Suppl FLAVIA Glucometer Accu check. Test Blood Sugars 2 Times Daily.  Dx: E 11.22, N 18.3 type II DM 6/21/17   Roge Guevara MD   omeprazole (PRILOSEC) 20 MG delayed release capsule Take 1 capsule by mouth Daily 4/12/17   Matt Long CNP   acetaminophen (TYLENOL) 500 MG tablet Take 1,000 mg by mouth every 6 hours as needed for Pain    Historical Provider, MD   diphenhydrAMINE (ALLERGY RELIEF) 25 MG tablet Take 25 mg by mouth every 6 hours as needed for Itching    Historical Provider, MD        Allergies:     Review of patient's allergies indicates no known allergies. Social History:     Tobacco:    reports that she has never smoked. She has never used smokeless tobacco.  Alcohol:      reports that she does not drink alcohol. Drug Use:  reports that she does not use drugs. Family History:     Family History   Problem Relation Age of Onset    Kidney Disease Mother     Cancer Father     Arthritis Brother      Review of Systems:     Positive and Negative as described in HPI. Review of Systems   Constitutional: Negative for chills and fever. Respiratory: Positive for shortness of breath. Negative for cough, sputum production and wheezing. Cardiovascular: Negative for chest pain and leg swelling. Gastrointestinal: Positive for constipation. Negative for abdominal pain, diarrhea, nausea and vomiting. Genitourinary: Negative for dysuria and urgency. Musculoskeletal: Positive for myalgias. Skin: Negative for rash. Neurological: Positive for weakness. Negative for dizziness, speech change, focal weakness and headaches. Psychiatric/Behavioral: Negative for depression. The patient is not nervous/anxious. Physical Exam:   BP (!) 151/74   Pulse 119   Temp 97.5 °F (36.4 °C) (Oral)   Resp (!) 38   Ht 5' 3\" (1.6 m)   Wt 174 lb (78.9 kg)   SpO2 94%   BMI 30.82 kg/m²   Temp (24hrs), Av.5 °F (36.4 °C), Min:97.4 °F (36.3 °C), Max:97.5 °F (36.4 °C)    No results for input(s): POCGLU in the last 72 hours. No intake or output data in the 24 hours ending 18 914    Physical Exam   Constitutional: She is oriented to person, place, and time and well-developed, well-nourished, and in no distress. No distress. HENT:   Head: Normocephalic and atraumatic. Eyes: Pupils are equal, round, and reactive to light. Neck: Normal range of motion. Neck supple. No JVD present. Cardiovascular: Regular rhythm, normal heart sounds and intact distal pulses. Tachycardia present. Pulmonary/Chest: Tachypnea noted. No respiratory distress. She has decreased breath sounds. She has no wheezes. She has no rhonchi. Abdominal: Soft. Bowel sounds are normal. She exhibits no distension. There is no tenderness. Neurological: She is alert and oriented to person, place, and time. Motor and sensory grossly intact   Skin: Skin is warm and dry. She is not diaphoretic. Psychiatric: Affect and judgment normal.   Vitals reviewed.     Investigations:      Laboratory Testing:  Recent Results (from the past 24 hour(s))   CBC Auto Differential    Collection Time: 02/17/18  4:00 PM   Result Value Ref Range    WBC 13.3 (H) 3.5 - 11.0 k/uL    RBC 3.75 (L) 4.0 - 5.2 m/uL    Hemoglobin 9.1 (L) 12.0 - 16.0 g/dL    Hematocrit 29.5 (L) 36 - 46 %    MCV 78.5 (L) 80 - 100 fL    MCH 24.2 (L) 26 - 34 pg    MCHC 30.9 (L) 31 - 37 g/dL    RDW 17.2 (H) 11.5 - 14.9 %    Platelets 115 (H) 614 - 450 k/uL    MPV 7.4 6.0 - 12.0 fL    NRBC Automated NOT REPORTED per 100 WBC    Differential Type NOT REPORTED     Immature Granulocytes NOT REPORTED 0 %    Absolute Immature Granulocyte NOT REPORTED 0.00 - 0.30 k/uL    WBC Morphology NOT REPORTED     RBC Morphology NOT REPORTED     Platelet Estimate NOT REPORTED     Seg Neutrophils 82 (H) 36 - 66 %    Lymphocytes 7 (L) 24 - 44 %    Monocytes 9 (H) 1 - 7 %    Eosinophils % 1 0 - 4 %    Basophils 1 0 - 2 %    Segs Absolute 10.91 (H) 1.3 - 9.1 k/uL    Absolute Lymph # 0.93 (L) 1.0 - 4.8 k/uL    Absolute Mono # 1.20 0.1 - 1.3 k/uL    Absolute Eos # 0.13 0.0 - 0.4 k/uL    Basophils # 0.13 0.0 - 0.2 k/uL    Morphology ANISOCYTOSIS PRESENT    Comprehensive Metabolic Panel    Collection Time: 02/17/18  4:00 PM   Result Value Ref Range    Glucose 169 (H) 70 - 99 mg/dL    BUN 21 8 - 23 mg/dL    CREATININE 1.43 (H) 0.50 - 0.90 02/17/2018    UTI (urinary tract infection) [N39.0] 02/17/2018    BLANE (acute kidney injury) ([facility] [address] [N17.9] 02/17/2018    Hx of pulmonary embolus [Z86.711] 02/17/2018    Essential (primary) hypertension [I10] 03/13/2017    Restless leg syndrome [G25.81] 03/13/2017    Gastroesophageal reflux disease without esophagitis [K21.9] 03/13/2017    Osteoarthritis [M19.90] 06/26/2013     Plan:     Patient status Admit as inpatient in the  Progressive Unit/Step down    #Sepsis, UTI +/- Pneumonia  -SOB, Tachypnic, tachycardic, leukocytosis, elevated lactate  -Recent hx UTI, on PO Cipro  -CXR possible pneumonia  -Pan cultures, pneumonia work-up  -IVF, repeat lactate, RT eval  -IV Levaquin    #Hx PE   -INR 1.2  -Pharmacy to dose coumadin  -Heparin ggt given INR subtherapeutic      #BLANE vs. CKD ?  -Cr 1.43, elevated in past, don't have all labs available  -IVF, follow BMP    #HTN  -Stable  -Holding lisinopril, elevated Cr    #T2DM  -Hold orals  -SSI     #Iron Deficiency Anemia  -Hgb 9.1, MCV 78  -Iron studies  -Fe replacement      Consultations:   IP CONSULT TO INTERNAL MEDICINE  IP CONSULT TO SOCIAL WORK  PHARMACY TO DOSE WARFARIN    Patient is admitted as inpatient status because of co-morbidities listed above, severity of signs and symptoms as outlined, requirement for current medical therapies and most importantly because of direct risk to patient if care not provided in a hospital setting. Marlena Armas MD  2/17/2018  6:58 PM    Copy sent to Dr. Kym Carrasco CNP       IM Attending    Pt seen and examined today   I have discussed the care of pt , including pertinent history and exam findings,  with the resident. I have reviewed the key elements of all parts of the encounter with the resident. I agree with the assessment, plan and orders as documented by the resident.     Concern for pneumonia   Hypoxia hx of PE INR subtherapeutic   rx with heparin drip and levaquin    Electronically signed by Caryle Lace Leana Smith MD on 2/18/2018 at 12:43 PM

## 2018-02-17 NOTE — ED PROVIDER NOTES
Single     Spouse name: N/A    Number of children: N/A    Years of education: N/A     Occupational History    Not on file. Social History Main Topics    Smoking status: Never Smoker    Smokeless tobacco: Never Used    Alcohol use No    Drug use: No    Sexual activity: Yes     Other Topics Concern    Not on file     Social History Narrative    No narrative on file       Family History   Problem Relation Age of Onset    Kidney Disease Mother     Cancer Father     Arthritis Brother        Allergies:  Review of patient's allergies indicates no known allergies. Home Medications:  Prior to Admission medications    Medication Sig Start Date End Date Taking? Authorizing Provider   acetaminophen (TYLENOL ARTHRITIS PAIN) 650 MG extended release tablet Take 1,300 mg by mouth 2 times daily   Yes Historical Provider, MD   LORazepam (ATIVAN) 0.5 MG tablet Take 0.5 mg by mouth every 6 hours as needed for Anxiety. Yes Historical Provider, MD   ciprofloxacin (CIPRO) 500 MG tablet Take 1 tablet by mouth 2 times daily for 10 days 2/14/18 2/24/18 Yes Florina Common, CNP   docusate sodium (COLACE) 100 MG capsule Take 1 capsule by mouth 2 times daily 2/12/18  Yes Florina Common, CNP   famotidine (PEPCID) 20 MG tablet Take 1 tablet by mouth 2 times daily 2/12/18  Yes Florina Common, CNP   glimepiride (AMARYL) 4 MG tablet Take 1 tablet by mouth 2 times daily 2/12/18  Yes Florina Common, CNP   rOPINIRole (REQUIP) 0.5 MG tablet Take 1 tablet by mouth 3 times daily 2/1/18  Yes Florina Common, CNP   metFORMIN (GLUCOPHAGE-XR) 500 MG extended release tablet Take 1 tablet by mouth 2 times daily (before meals) Take 500 mg a day for 2 weeks, then increase the dose to twice a day.  9/25/17  Yes Florina Common, CNP   warfarin (COUMADIN) 3 MG tablet Take 1 tablet by mouth daily  Patient taking differently: Take 1 mg by mouth daily  8/14/17  Yes Florina Common, CNP   lisinopril-hydrochlorothiazide Hematocrit 29.5 (L) 36 - 46 %    MCV 78.5 (L) 80 - 100 fL    MCH 24.2 (L) 26 - 34 pg    MCHC 30.9 (L) 31 - 37 g/dL    RDW 17.2 (H) 11.5 - 14.9 %    Platelets 003 (H) 427 - 450 k/uL    MPV 7.4 6.0 - 12.0 fL    NRBC Automated NOT REPORTED per 100 WBC    Differential Type NOT REPORTED     Immature Granulocytes NOT REPORTED 0 %    Absolute Immature Granulocyte NOT REPORTED 0.00 - 0.30 k/uL    WBC Morphology NOT REPORTED     RBC Morphology NOT REPORTED     Platelet Estimate NOT REPORTED     Seg Neutrophils 82 (H) 36 - 66 %    Lymphocytes 7 (L) 24 - 44 %    Monocytes 9 (H) 1 - 7 %    Eosinophils % 1 0 - 4 %    Basophils 1 0 - 2 %    Segs Absolute 10.91 (H) 1.3 - 9.1 k/uL    Absolute Lymph # 0.93 (L) 1.0 - 4.8 k/uL    Absolute Mono # 1.20 0.1 - 1.3 k/uL    Absolute Eos # 0.13 0.0 - 0.4 k/uL    Basophils # 0.13 0.0 - 0.2 k/uL    Morphology ANISOCYTOSIS PRESENT    Comprehensive Metabolic Panel   Result Value Ref Range    Glucose 169 (H) 70 - 99 mg/dL    BUN 21 8 - 23 mg/dL    CREATININE 1.43 (H) 0.50 - 0.90 mg/dL    Bun/Cre Ratio NOT REPORTED 9 - 20    Calcium 9.8 8.6 - 10.4 mg/dL    Sodium 136 135 - 144 mmol/L    Potassium 4.4 3.7 - 5.3 mmol/L    Chloride 98 98 - 107 mmol/L    CO2 21 20 - 31 mmol/L    Anion Gap 17 9 - 17 mmol/L    Alkaline Phosphatase 72 35 - 104 U/L    ALT 16 5 - 33 U/L    AST 14 <32 U/L    Total Bilirubin 0.25 (L) 0.3 - 1.2 mg/dL    Total Protein 7.4 6.4 - 8.3 g/dL    Alb 3.7 3.5 - 5.2 g/dL    Albumin/Globulin Ratio NOT REPORTED 1.0 - 2.5    GFR Non- 36 (L) >60 mL/min    GFR  44 (L) >60 mL/min    GFR Comment          GFR Staging NOT REPORTED    Protime-INR   Result Value Ref Range    Protime 12.7 (H) 9.7 - 12.0 sec    INR 1.2    Lactic Acid   Result Value Ref Range    Lactic Acid 2.4 (H) 0.5 - 2.2 mmol/L   Brain Natriuretic Peptide   Result Value Ref Range    Pro- (H) <300 pg/mL    BNP Interpretation         Lactate, Sepsis   Result Value Ref Range    Lactic Acid, pre-hypertension or Hypertension based on a blood pressure reading in the Emergency Department. I recommend you call the primary care provider listed on your discharge instructions or a physician of your choice this week to arrange follow up for further evaluation of possible pre-hypertension or Hypertension. (Please note that portions of this note were completed with a voice recognition program.  Efforts were made to edit the dictations but occasionally words are mis-transcribed.  Whenever words are used in this note in any gender, they shall be construed as though they were used in the gender appropriate to the circumstances; and whenever words are used in this note in the singular or plural form, they shall be construed as though they were used in the form appropriate to the circumstances.)       Ann-Marie Aburto,   Resident  02/17/18 2048

## 2018-02-18 LAB
-: ABNORMAL
ABSOLUTE EOS #: 0.11 K/UL (ref 0–0.4)
ABSOLUTE IMMATURE GRANULOCYTE: ABNORMAL K/UL (ref 0–0.3)
ABSOLUTE LYMPH #: 1.48 K/UL (ref 1–4.8)
ABSOLUTE MONO #: 1.03 K/UL (ref 0.1–1.3)
ALBUMIN SERPL-MCNC: 3.2 G/DL (ref 3.5–5.2)
ALBUMIN/GLOBULIN RATIO: ABNORMAL (ref 1–2.5)
ALP BLD-CCNC: 60 U/L (ref 35–104)
ALT SERPL-CCNC: 15 U/L (ref 5–33)
AMORPHOUS: ABNORMAL
ANION GAP SERPL CALCULATED.3IONS-SCNC: 15 MMOL/L (ref 9–17)
AST SERPL-CCNC: 15 U/L
BACTERIA: ABNORMAL
BASOPHILS # BLD: 1 % (ref 0–2)
BASOPHILS ABSOLUTE: 0.11 K/UL (ref 0–0.2)
BILIRUB SERPL-MCNC: 0.15 MG/DL (ref 0.3–1.2)
BILIRUBIN DIRECT: <0.08 MG/DL
BILIRUBIN URINE: NEGATIVE
BILIRUBIN, INDIRECT: ABNORMAL MG/DL (ref 0–1)
BUN BLDV-MCNC: 18 MG/DL (ref 8–23)
BUN/CREAT BLD: ABNORMAL (ref 9–20)
CALCIUM SERPL-MCNC: 9.1 MG/DL (ref 8.6–10.4)
CASTS UA: ABNORMAL /LPF
CHLORIDE BLD-SCNC: 101 MMOL/L (ref 98–107)
CO2: 22 MMOL/L (ref 20–31)
COLOR: YELLOW
COMMENT UA: ABNORMAL
CREAT SERPL-MCNC: 1.1 MG/DL (ref 0.5–0.9)
CRYSTALS, UA: ABNORMAL /HPF
DIFFERENTIAL TYPE: ABNORMAL
DIRECT EXAM: NORMAL
EOSINOPHILS RELATIVE PERCENT: 1 % (ref 0–4)
EPITHELIAL CELLS UA: ABNORMAL /HPF
FERRITIN: 28 UG/L (ref 13–150)
GFR AFRICAN AMERICAN: 60 ML/MIN
GFR NON-AFRICAN AMERICAN: 49 ML/MIN
GFR SERPL CREATININE-BSD FRML MDRD: ABNORMAL ML/MIN/{1.73_M2}
GFR SERPL CREATININE-BSD FRML MDRD: ABNORMAL ML/MIN/{1.73_M2}
GLOBULIN: ABNORMAL G/DL (ref 1.5–3.8)
GLUCOSE BLD-MCNC: 114 MG/DL (ref 65–105)
GLUCOSE BLD-MCNC: 52 MG/DL (ref 65–105)
GLUCOSE BLD-MCNC: 63 MG/DL (ref 70–99)
GLUCOSE BLD-MCNC: 91 MG/DL (ref 65–105)
GLUCOSE URINE: NEGATIVE
HCT VFR BLD CALC: 23.7 % (ref 36–46)
HEMOGLOBIN: 7.6 G/DL (ref 12–16)
IMMATURE GRANULOCYTES: ABNORMAL %
INR BLD: 1.4
IRON SATURATION: 7 % (ref 20–55)
IRON: 20 UG/DL (ref 37–145)
KETONES, URINE: NEGATIVE
LACTIC ACID: 0.9 MMOL/L (ref 0.5–2.2)
LEUKOCYTE ESTERASE, URINE: ABNORMAL
LYMPHOCYTES # BLD: 13 % (ref 24–44)
Lab: NORMAL
Lab: NORMAL
MAGNESIUM: 1.4 MG/DL (ref 1.6–2.6)
MCH RBC QN AUTO: 25 PG (ref 26–34)
MCHC RBC AUTO-ENTMCNC: 32.2 G/DL (ref 31–37)
MCV RBC AUTO: 77.6 FL (ref 80–100)
MONOCYTES # BLD: 9 % (ref 1–7)
MORPHOLOGY: ABNORMAL
MUCUS: ABNORMAL
NITRITE, URINE: NEGATIVE
NRBC AUTOMATED: ABNORMAL PER 100 WBC
OTHER OBSERVATIONS UA: ABNORMAL
PARTIAL THROMBOPLASTIN TIME: 43.6 SEC (ref 23–31)
PARTIAL THROMBOPLASTIN TIME: 50.1 SEC (ref 23–31)
PARTIAL THROMBOPLASTIN TIME: 54.4 SEC (ref 23–31)
PDW BLD-RTO: 16.8 % (ref 11.5–14.9)
PH UA: 6 (ref 5–8)
PHOSPHORUS: 3 MG/DL (ref 2.6–4.5)
PLATELET # BLD: 484 K/UL (ref 150–450)
PLATELET ESTIMATE: ABNORMAL
PMV BLD AUTO: 6.9 FL (ref 6–12)
POTASSIUM SERPL-SCNC: 3.6 MMOL/L (ref 3.7–5.3)
PROTEIN UA: NEGATIVE
PROTHROMBIN TIME: 14 SEC (ref 9.7–12)
RBC # BLD: 3.05 M/UL (ref 4–5.2)
RBC # BLD: ABNORMAL 10*6/UL
RBC UA: ABNORMAL /HPF
RENAL EPITHELIAL, UA: ABNORMAL /HPF
SEG NEUTROPHILS: 76 % (ref 36–66)
SEGMENTED NEUTROPHILS ABSOLUTE COUNT: 8.67 K/UL (ref 1.3–9.1)
SODIUM BLD-SCNC: 138 MMOL/L (ref 135–144)
SPECIFIC GRAVITY UA: 1.02 (ref 1–1.03)
SPECIMEN DESCRIPTION: NORMAL
STATUS: NORMAL
STATUS: NORMAL
TOTAL IRON BINDING CAPACITY: 297 UG/DL (ref 250–450)
TOTAL PROTEIN: 6.8 G/DL (ref 6.4–8.3)
TRICHOMONAS: ABNORMAL
TROPONIN INTERP: NORMAL
TROPONIN INTERP: NORMAL
TROPONIN T: <0.03 NG/ML
TROPONIN T: <0.03 NG/ML
TSH SERPL DL<=0.05 MIU/L-ACNC: 1.69 MIU/L (ref 0.3–5)
TURBIDITY: ABNORMAL
UNSATURATED IRON BINDING CAPACITY: 277 UG/DL (ref 112–347)
URINE HGB: ABNORMAL
UROBILINOGEN, URINE: NORMAL
WBC # BLD: 11.4 K/UL (ref 3.5–11)
WBC # BLD: ABNORMAL 10*3/UL
WBC UA: ABNORMAL /HPF
YEAST: ABNORMAL

## 2018-02-18 PROCEDURE — 6360000002 HC RX W HCPCS: Performed by: STUDENT IN AN ORGANIZED HEALTH CARE EDUCATION/TRAINING PROGRAM

## 2018-02-18 PROCEDURE — 6370000000 HC RX 637 (ALT 250 FOR IP): Performed by: STUDENT IN AN ORGANIZED HEALTH CARE EDUCATION/TRAINING PROGRAM

## 2018-02-18 PROCEDURE — 84100 ASSAY OF PHOSPHORUS: CPT

## 2018-02-18 PROCEDURE — 36415 COLL VENOUS BLD VENIPUNCTURE: CPT

## 2018-02-18 PROCEDURE — 81001 URINALYSIS AUTO W/SCOPE: CPT

## 2018-02-18 PROCEDURE — 87449 NOS EACH ORGANISM AG IA: CPT

## 2018-02-18 PROCEDURE — 85025 COMPLETE CBC W/AUTO DIFF WBC: CPT

## 2018-02-18 PROCEDURE — 80048 BASIC METABOLIC PNL TOTAL CA: CPT

## 2018-02-18 PROCEDURE — 87086 URINE CULTURE/COLONY COUNT: CPT

## 2018-02-18 PROCEDURE — 6370000000 HC RX 637 (ALT 250 FOR IP): Performed by: FAMILY MEDICINE

## 2018-02-18 PROCEDURE — 83540 ASSAY OF IRON: CPT

## 2018-02-18 PROCEDURE — 99223 1ST HOSP IP/OBS HIGH 75: CPT | Performed by: INTERNAL MEDICINE

## 2018-02-18 PROCEDURE — 82728 ASSAY OF FERRITIN: CPT

## 2018-02-18 PROCEDURE — 82947 ASSAY GLUCOSE BLOOD QUANT: CPT

## 2018-02-18 PROCEDURE — 85730 THROMBOPLASTIN TIME PARTIAL: CPT

## 2018-02-18 PROCEDURE — 83550 IRON BINDING TEST: CPT

## 2018-02-18 PROCEDURE — 80076 HEPATIC FUNCTION PANEL: CPT

## 2018-02-18 PROCEDURE — 6370000000 HC RX 637 (ALT 250 FOR IP): Performed by: INTERNAL MEDICINE

## 2018-02-18 PROCEDURE — 83605 ASSAY OF LACTIC ACID: CPT

## 2018-02-18 PROCEDURE — 87899 AGENT NOS ASSAY W/OPTIC: CPT

## 2018-02-18 PROCEDURE — 84484 ASSAY OF TROPONIN QUANT: CPT

## 2018-02-18 PROCEDURE — 6360000002 HC RX W HCPCS: Performed by: FAMILY MEDICINE

## 2018-02-18 PROCEDURE — 85610 PROTHROMBIN TIME: CPT

## 2018-02-18 PROCEDURE — 83735 ASSAY OF MAGNESIUM: CPT

## 2018-02-18 PROCEDURE — 2060000000 HC ICU INTERMEDIATE R&B

## 2018-02-18 PROCEDURE — 2500000003 HC RX 250 WO HCPCS: Performed by: FAMILY MEDICINE

## 2018-02-18 PROCEDURE — 84443 ASSAY THYROID STIM HORMONE: CPT

## 2018-02-18 PROCEDURE — 2580000003 HC RX 258: Performed by: EMERGENCY MEDICINE

## 2018-02-18 PROCEDURE — S0028 INJECTION, FAMOTIDINE, 20 MG: HCPCS | Performed by: FAMILY MEDICINE

## 2018-02-18 RX ORDER — POLYETHYLENE GLYCOL 3350 17 G/17G
17 POWDER, FOR SOLUTION ORAL DAILY
Status: DISCONTINUED | OUTPATIENT
Start: 2018-02-18 | End: 2018-02-19 | Stop reason: HOSPADM

## 2018-02-18 RX ORDER — MAGNESIUM SULFATE 1 G/100ML
1 INJECTION INTRAVENOUS
Status: COMPLETED | OUTPATIENT
Start: 2018-02-18 | End: 2018-02-18

## 2018-02-18 RX ORDER — FERROUS SULFATE 325(65) MG
325 TABLET ORAL
Status: DISCONTINUED | OUTPATIENT
Start: 2018-02-18 | End: 2018-02-19 | Stop reason: HOSPADM

## 2018-02-18 RX ORDER — LIDOCAINE 50 MG/G
1 PATCH TOPICAL DAILY
Status: DISCONTINUED | OUTPATIENT
Start: 2018-02-18 | End: 2018-02-19 | Stop reason: HOSPADM

## 2018-02-18 RX ORDER — WARFARIN SODIUM 5 MG/1
5 TABLET ORAL ONCE
Status: COMPLETED | OUTPATIENT
Start: 2018-02-18 | End: 2018-02-18

## 2018-02-18 RX ORDER — ACETAMINOPHEN 500 MG
1000 TABLET ORAL EVERY 6 HOURS PRN
Status: DISCONTINUED | OUTPATIENT
Start: 2018-02-18 | End: 2018-02-19 | Stop reason: HOSPADM

## 2018-02-18 RX ADMIN — ACETAMINOPHEN 1000 MG: 500 TABLET ORAL at 06:07

## 2018-02-18 RX ADMIN — Medication 10 ML: at 21:10

## 2018-02-18 RX ADMIN — POLYETHYLENE GLYCOL 3350 17 G: 17 POWDER, FOR SOLUTION ORAL at 08:55

## 2018-02-18 RX ADMIN — MAGNESIUM SULFATE HEPTAHYDRATE 1 G: 1 INJECTION, SOLUTION INTRAVENOUS at 14:44

## 2018-02-18 RX ADMIN — DICLOFENAC 2 G: 10 GEL TOPICAL at 18:34

## 2018-02-18 RX ADMIN — FAMOTIDINE 20 MG: 10 INJECTION, SOLUTION INTRAVENOUS at 08:55

## 2018-02-18 RX ADMIN — Medication 10 ML: at 10:08

## 2018-02-18 RX ADMIN — ROPINIROLE 0.5 MG: 0.5 TABLET, FILM COATED ORAL at 08:55

## 2018-02-18 RX ADMIN — MAGNESIUM SULFATE HEPTAHYDRATE 1 G: 1 INJECTION, SOLUTION INTRAVENOUS at 16:14

## 2018-02-18 RX ADMIN — POTASSIUM CHLORIDE 40 MEQ: 20 TABLET, EXTENDED RELEASE ORAL at 08:55

## 2018-02-18 RX ADMIN — DICLOFENAC 2 G: 10 GEL TOPICAL at 10:08

## 2018-02-18 RX ADMIN — ACETAMINOPHEN 1000 MG: 500 TABLET ORAL at 14:44

## 2018-02-18 RX ADMIN — WARFARIN SODIUM 5 MG: 5 TABLET ORAL at 17:34

## 2018-02-18 RX ADMIN — ATORVASTATIN CALCIUM 20 MG: 20 TABLET, FILM COATED ORAL at 08:55

## 2018-02-18 RX ADMIN — LEVOFLOXACIN 500 MG: 5 INJECTION, SOLUTION INTRAVENOUS at 18:31

## 2018-02-18 RX ADMIN — HEPARIN SODIUM AND DEXTROSE 14 UNITS/KG/HR: 10000; 5 INJECTION INTRAVENOUS at 21:05

## 2018-02-18 RX ADMIN — FERROUS SULFATE TAB 325 MG (65 MG ELEMENTAL FE) 325 MG: 325 (65 FE) TAB at 08:55

## 2018-02-18 RX ADMIN — ROPINIROLE 0.5 MG: 0.5 TABLET, FILM COATED ORAL at 21:06

## 2018-02-18 RX ADMIN — ROPINIROLE 0.5 MG: 0.5 TABLET, FILM COATED ORAL at 14:44

## 2018-02-18 RX ADMIN — DOCUSATE SODIUM 100 MG: 100 CAPSULE, LIQUID FILLED ORAL at 21:05

## 2018-02-18 RX ADMIN — ACETAMINOPHEN 1000 MG: 500 TABLET ORAL at 21:05

## 2018-02-18 ASSESSMENT — ENCOUNTER SYMPTOMS
SPUTUM PRODUCTION: 0
NAUSEA: 0
SHORTNESS OF BREATH: 1
VOMITING: 0
BLURRED VISION: 0
WHEEZING: 0
ABDOMINAL PAIN: 0
DIARRHEA: 0
CONSTIPATION: 1
COUGH: 0
DOUBLE VISION: 0

## 2018-02-18 ASSESSMENT — PAIN DESCRIPTION - LOCATION
LOCATION: GENERALIZED
LOCATION: BACK

## 2018-02-18 ASSESSMENT — PAIN DESCRIPTION - DESCRIPTORS: DESCRIPTORS: ACHING

## 2018-02-18 ASSESSMENT — PAIN SCALES - GENERAL
PAINLEVEL_OUTOF10: 7
PAINLEVEL_OUTOF10: 5
PAINLEVEL_OUTOF10: 10
PAINLEVEL_OUTOF10: 6
PAINLEVEL_OUTOF10: 7
PAINLEVEL_OUTOF10: 8
PAINLEVEL_OUTOF10: 8
PAINLEVEL_OUTOF10: 4
PAINLEVEL_OUTOF10: 5

## 2018-02-18 ASSESSMENT — PAIN DESCRIPTION - PAIN TYPE
TYPE: CHRONIC PAIN
TYPE: CHRONIC PAIN

## 2018-02-18 ASSESSMENT — PAIN DESCRIPTION - FREQUENCY: FREQUENCY: CONTINUOUS

## 2018-02-18 NOTE — ED NOTES
Writer started another IV and marcy the 2nd blood cx with same.       Syed Montoya, RN  02/18/18 1935

## 2018-02-18 NOTE — PLAN OF CARE
Problem: Nutrition  Goal: Optimal nutrition therapy  Outcome: Ongoing  Nutrition Problem: Inadequate oral intake  Intervention: Food and/or Nutrient Delivery: Continue current diet  Nutritional Goals: PO intakes to meet >75% estimated needs.

## 2018-02-18 NOTE — PLAN OF CARE
Problem: Falls - Risk of  Goal: Absence of falls  Outcome: Ongoing  Pt resting comfortable in bed at this time. No distress noted. Assessment remains as charted. Pt remains free from falls this shift. Bed in low position, call light in reach, side rails up x 2, Cont to monior closely    Problem: Airway Clearance - Ineffective:  Goal: Clear lung sounds  Clear lung sounds   Outcome: Ongoing  Pt able to maintain a clear airway    Problem: Fluid Volume - Deficit:  Goal: Achieves intake and output within specified parameters  Achieves intake and output within specified parameters   Outcome: Ongoing  Monitor I & O's    Problem: Gas Exchange - Impaired:  Goal: Levels of oxygenation will improve  Levels of oxygenation will improve   Outcome: Ongoing      Problem: ABCDS Injury Assessment  Goal: Absence of physical injury  Outcome: Ongoing  Pt free from injury this shift    Problem: Pain:  Goal: Pain level will decrease  Pain level will decrease   Outcome: Ongoing  See MAR, Give meds as needed  Assist pt with repositioning for comfort.   Cont to monitor closely  Goal: Control of acute pain  Control of acute pain   Outcome: Ongoing    Goal: Control of chronic pain  Control of chronic pain   Outcome: Ongoing

## 2018-02-18 NOTE — PROGRESS NOTES
[id number] [facility]    Progress Note    2/18/2018    9:35 AM    Name:   Pedro Fleischer  MRN:     [id number]     Acct:      [de-identified]   Room:   2092/2092-01   Day:  1  Admit Date:  2/17/2018  3:24 PM    PCP:   Cintia Alves CNP  Code Status:  Full Code    Subjective:     C/C:   Chief Complaint   Patient presents with    Dehydration    Shortness of Breath    Constipation     Interval History Status: not changed. Patient seen and examined at bedside. No acute events overnight. This morning patient went into ventricular bigeminy on monitor, HR 120s. EKG and trops ordered. Patient feels unchanged from yesterday. She is up sitting on edge of bed eating, poor appetite. She did have small BM, no blood. Whole body feels stiff. No fever, chills, CP, dysuria. SOB is slightly improved. Pain is now worse in L shoulder and radiates to mid chest. Patient remains on heparin ggt. On Levaquin. Brief History:     Patient with a history or PE on coumadin, vocal cord paralysis, T2DM, arthritis, and iron deficiency anemia who presents with worsening body aches, SOB and generalized weakness. Symptoms started 4-5 weeks ago, have been worsening. She was recently seen by PCP, +UTI, started on PO Cipro. She denies CP, cough, n/v, abdominal pain, focal weakness. She does complain of constipation, had been on Percocet for arthritis, but that was stopped 2 weeks ago, now taking 6-650 mg Tylenol daily.      In ER, patient afebrile, but tachypnic, tachycardic, O2 saturation wnl on RA. WBC 13K, HGB 9.1, Cr 1.43, INR 1.2, Lactic acid 2.4. Cultures drawn. Patient given IVF bolus, rocephin and levaquin. Review of Systems:     Review of Systems   Constitutional: Negative for chills and fever. Eyes: Negative for blurred vision and double vision. Respiratory: Positive for shortness of breath. Negative for cough, sputum production and wheezing.     Cardiovascular: Positive potentially in the lingula, potentially atelectasis or pneumonia. 2. Prominent elevation the right hemidiaphragm suggestive of right phrenic nerve palsy. Physical Examination:        Physical Exam   Constitutional: She is oriented to person, place, and time and well-developed, well-nourished, and in no distress. No distress. HENT:   Head: Normocephalic and atraumatic. Eyes: Pupils are equal, round, and reactive to light. Neck: Normal range of motion. Neck supple. No JVD present. Cardiovascular: Intact distal pulses. An irregular rhythm present. Tachycardia present. Pulmonary/Chest: Effort normal and breath sounds normal. No respiratory distress. She exhibits tenderness. Abdominal: Soft. Bowel sounds are normal. She exhibits no distension. There is no tenderness. Neurological: She is alert and oriented to person, place, and time. Skin: Skin is warm and dry. She is not diaphoretic. Psychiatric: Affect and judgment normal. Her mood appears anxious. Vitals reviewed.     Assessment:        Primary Problem  Sepsis [facility])    Active Hospital Problems    Diagnosis Date Noted    Pneumonia [J18.9] 02/17/2018    Sepsis ([facility] [address] [A41.9] 02/17/2018    Constipation [K59.00] 02/17/2018    UTI (urinary tract infection) [N39.0] 02/17/2018    BLANE (acute kidney injury) ([facility] [address] [N17.9] 02/17/2018    Hx of pulmonary embolus [Z86.711] 02/17/2018    Essential (primary) hypertension [I10] 03/13/2017    Restless leg syndrome [G25.81] 03/13/2017    Gastroesophageal reflux disease without esophagitis [K21.9] 03/13/2017    Osteoarthritis [M19.90] 06/26/2013       Plan:        Patient status Admit as inpatient in the  Progressive Unit/Step down     #Sepsis, UTI +/- Pneumonia, improving  -SOB, Tachypnic, tachycardic, leukocytosis, elevated lactate  -Recent hx UTI, on PO Cipro  -CXR possible pneumonia  -Pan cultures, pneumonia work-up in process  -Procalc 0.16  -IV Levaquin     #Hx PE w/cardiac arrest  -INR

## 2018-02-18 NOTE — PROGRESS NOTES
Pharmacy Note  Warfarin Consult follow-up      Recent Labs      02/17/18   1600   INR  1.2     Recent Labs      02/17/18   1600  02/18/18   0552   HGB  9.1*  7.6*   HCT  29.5*  23.7*   PLT  570*  484*       Significant Drug-Drug Interactions:  New warfarin drug-drug interactions: None  Discontinued drug-drug interactions: None  Current warfarin drug-drug interactions: requip, heparin full dose infusion, levaquin      Date             INR        Dose given previous day  Dose scheduled for today  2/18/2018            1.4       5 mg           5 mg        Notes:                     Daily PT/INR while inpatient.      Linda CarltonD, BCPS 2/18/2018 2:42 PM

## 2018-02-18 NOTE — ED NOTES
Writer called U to give report. Elvia Paige said that Ashley Chang is in report currently and the Charge RN stepped off the floor. She will have Ashley Chang call back to get report.       Pedrito Kaye RN  02/17/18 [id number]

## 2018-02-18 NOTE — PROGRESS NOTES
Breath Sounds: clear/diminished   RR[de-identified] 18  Pulse Sat: 99%  Home Meds: none    · Bronchodilator assessment at level:   · []    Home Level  BRONCHODILATOR ASSESSMENT SCORE  Score 0 1 2 3 4 5   Breath Sounds   [x]  Patient Baseline []  No Wheeze good aeration []  Faint, scattered wheezing, good aeration []  Expiratory Wheezing and or moderately diminished []  Insp/Exp wheeze and/or very diminished []  Insp/Exp and/ or marked distress   Respiratory Rate   [x]  Patient Baseline []  Less than 20 []  Less than 20 []  20-25 []  Greater than 25 []  Greater than 25   Peak flow % of Pred or PB [x]  NA   []  Greater than 90%  []  81-90% []  71-80% []  Less than or equal to 70%  or unable to perform []  Unable due to Respiratory Distress   Dyspnea re []  Patient Baseline []  No SOB []  No SOB [x]  SOB on exertion []  SOB min activity []  At rest/acute   e FEV% Predicted       [x]  NA []  Above 69%  []  Unable []  Above 60-69%  []  Unable []  Above 50-59%  []  Unable []  Above 35-49%  []  Unable []  Less than 35%  []  Unable

## 2018-02-18 NOTE — PROGRESS NOTES
Nutrition Assessment    Type and Reason for Visit: Positive Nutrition Screen (poor PO intakes, wt loss)    Nutrition Recommendations: Continue diet as ordered. Consider oral nutrition supplement if PO intakes remain <50% of meals. Malnutrition Assessment:  · Malnutrition Status: Mild Malnutrition  · Context: Acute illness or injury  · Findings of the 6 clinical characteristics of malnutrition (Minimum of 2 out of 6 clinical characteristics is required to make the diagnosis of moderate or severe Protein Calorie Malnutrition based on AND/ASPEN Guidelines):  1. Energy Intake-Less than or equal to 50%, greater than or equal to 1 month    2. Weight Loss-No significant weight loss,    3. Fat Loss-Mild subcutaneous fat loss, Orbital  4. Muscle Loss-Mild muscle mass loss, Temples (temporalis muscle), Interosseous    Nutrition Diagnosis:   · Problem: Inadequate oral intake  · Etiology: related to Insufficient energy/nutrient consumption     Signs and symptoms:  as evidenced by Diet history of poor intake, Patient report of, Intake 0-25%, Intake 25-50%, Weight loss    Nutrition Assessment:  · Subjective Assessment: Pt reports poor appetite d/t pain from arthritis. States consumed ~25-50% of breakfast and lunch. Pt indicates pain meds also caused constipation. PO intakes poor x 4 weeks. Pt also indicates she was attempting to lose weight prior to this. Follows with dietitian at [facility] for DM and is compliant with diet. Pt somewhat cantankerous, which  r/t pt's poorly controlled pain. RD offered nutrition supplement, which pt refuses. RD encouraged PO intakes.    · Current Nutrition Therapies:  · Oral Diet Orders: Carb Control 4 Carbs/Meal   · Oral Diet intake: 1-25%  · Anthropometric Measures:  · Ht: 5' 3\" (160 cm)   · Current Body Wt: 177 lb 14.6 oz (80.7 kg)  · Usual Body Wt: 184 lb (83.5 kg)  · % Weight Change: 3.8%,  2 months  · Ideal Body Wt: 115 lb (52.2 kg), % Ideal Body 154%  · Comparative Standards

## 2018-02-19 VITALS
RESPIRATION RATE: 18 BRPM | SYSTOLIC BLOOD PRESSURE: 128 MMHG | HEART RATE: 101 BPM | OXYGEN SATURATION: 94 % | HEIGHT: 63 IN | WEIGHT: 175.71 LBS | TEMPERATURE: 98.4 F | BODY MASS INDEX: 31.13 KG/M2 | DIASTOLIC BLOOD PRESSURE: 77 MMHG

## 2018-02-19 PROBLEM — E44.1 MILD PROTEIN-CALORIE MALNUTRITION (HCC): Status: ACTIVE | Noted: 2018-02-19

## 2018-02-19 LAB
ABSOLUTE EOS #: 0.16 K/UL (ref 0–0.4)
ABSOLUTE IMMATURE GRANULOCYTE: ABNORMAL K/UL (ref 0–0.3)
ABSOLUTE LYMPH #: 1.17 K/UL (ref 1–4.8)
ABSOLUTE MONO #: 0.86 K/UL (ref 0.1–1.3)
ANION GAP SERPL CALCULATED.3IONS-SCNC: 14 MMOL/L (ref 9–17)
BASOPHILS # BLD: 1 % (ref 0–2)
BASOPHILS ABSOLUTE: 0.08 K/UL (ref 0–0.2)
BUN BLDV-MCNC: 14 MG/DL (ref 8–23)
BUN/CREAT BLD: ABNORMAL (ref 9–20)
CALCIUM SERPL-MCNC: 8.8 MG/DL (ref 8.6–10.4)
CHLORIDE BLD-SCNC: 100 MMOL/L (ref 98–107)
CO2: 21 MMOL/L (ref 20–31)
CREAT SERPL-MCNC: 0.85 MG/DL (ref 0.5–0.9)
CULTURE: NORMAL
CULTURE: NORMAL
DIFFERENTIAL TYPE: ABNORMAL
EKG ATRIAL RATE: 109 BPM
EKG P AXIS: 39 DEGREES
EKG P-R INTERVAL: 132 MS
EKG Q-T INTERVAL: 328 MS
EKG QRS DURATION: 78 MS
EKG QTC CALCULATION (BAZETT): 441 MS
EKG R AXIS: 2 DEGREES
EKG T AXIS: 29 DEGREES
EKG VENTRICULAR RATE: 109 BPM
EOSINOPHILS RELATIVE PERCENT: 2 % (ref 0–4)
GFR AFRICAN AMERICAN: >60 ML/MIN
GFR NON-AFRICAN AMERICAN: >60 ML/MIN
GFR SERPL CREATININE-BSD FRML MDRD: ABNORMAL ML/MIN/{1.73_M2}
GFR SERPL CREATININE-BSD FRML MDRD: ABNORMAL ML/MIN/{1.73_M2}
GLUCOSE BLD-MCNC: 122 MG/DL (ref 65–105)
GLUCOSE BLD-MCNC: 125 MG/DL (ref 70–99)
GLUCOSE BLD-MCNC: 137 MG/DL (ref 65–105)
GLUCOSE BLD-MCNC: 137 MG/DL (ref 65–105)
GLUCOSE BLD-MCNC: 145 MG/DL (ref 65–105)
GLUCOSE BLD-MCNC: 194 MG/DL (ref 65–105)
HCT VFR BLD CALC: 23.1 % (ref 36–46)
HEMOGLOBIN: 7.3 G/DL (ref 12–16)
IMMATURE GRANULOCYTES: ABNORMAL %
INR BLD: 1.6
LYMPHOCYTES # BLD: 15 % (ref 24–44)
Lab: NORMAL
MCH RBC QN AUTO: 24.8 PG (ref 26–34)
MCHC RBC AUTO-ENTMCNC: 31.5 G/DL (ref 31–37)
MCV RBC AUTO: 78.6 FL (ref 80–100)
MONOCYTES # BLD: 11 % (ref 1–7)
MORPHOLOGY: ABNORMAL
MYCOPLASMA PNEUMONIAE IGM: 0.44
NRBC AUTOMATED: ABNORMAL PER 100 WBC
PARTIAL THROMBOPLASTIN TIME: 47.6 SEC (ref 23–31)
PARTIAL THROMBOPLASTIN TIME: 60.5 SEC (ref 23–31)
PARTIAL THROMBOPLASTIN TIME: 67.4 SEC (ref 23–31)
PDW BLD-RTO: 17.4 % (ref 11.5–14.9)
PLATELET # BLD: 472 K/UL (ref 150–450)
PLATELET ESTIMATE: ABNORMAL
PMV BLD AUTO: 6.9 FL (ref 6–12)
POTASSIUM SERPL-SCNC: 4.3 MMOL/L (ref 3.7–5.3)
PROTHROMBIN TIME: 16.7 SEC (ref 9.7–12)
RBC # BLD: 2.94 M/UL (ref 4–5.2)
RBC # BLD: ABNORMAL 10*6/UL
SEG NEUTROPHILS: 71 % (ref 36–66)
SEGMENTED NEUTROPHILS ABSOLUTE COUNT: 5.53 K/UL (ref 1.3–9.1)
SODIUM BLD-SCNC: 135 MMOL/L (ref 135–144)
SPECIMEN DESCRIPTION: NORMAL
SPECIMEN DESCRIPTION: NORMAL
STATUS: NORMAL
WBC # BLD: 7.8 K/UL (ref 3.5–11)
WBC # BLD: ABNORMAL 10*3/UL

## 2018-02-19 PROCEDURE — 6360000002 HC RX W HCPCS: Performed by: STUDENT IN AN ORGANIZED HEALTH CARE EDUCATION/TRAINING PROGRAM

## 2018-02-19 PROCEDURE — 99233 SBSQ HOSP IP/OBS HIGH 50: CPT | Performed by: INTERNAL MEDICINE

## 2018-02-19 PROCEDURE — 85610 PROTHROMBIN TIME: CPT

## 2018-02-19 PROCEDURE — 2580000003 HC RX 258: Performed by: FAMILY MEDICINE

## 2018-02-19 PROCEDURE — 36415 COLL VENOUS BLD VENIPUNCTURE: CPT

## 2018-02-19 PROCEDURE — 80048 BASIC METABOLIC PNL TOTAL CA: CPT

## 2018-02-19 PROCEDURE — S0028 INJECTION, FAMOTIDINE, 20 MG: HCPCS | Performed by: FAMILY MEDICINE

## 2018-02-19 PROCEDURE — 6370000000 HC RX 637 (ALT 250 FOR IP): Performed by: FAMILY MEDICINE

## 2018-02-19 PROCEDURE — 82947 ASSAY GLUCOSE BLOOD QUANT: CPT

## 2018-02-19 PROCEDURE — G8978 MOBILITY CURRENT STATUS: HCPCS

## 2018-02-19 PROCEDURE — G8979 MOBILITY GOAL STATUS: HCPCS

## 2018-02-19 PROCEDURE — 97162 PT EVAL MOD COMPLEX 30 MIN: CPT

## 2018-02-19 PROCEDURE — 86920 COMPATIBILITY TEST SPIN: CPT

## 2018-02-19 PROCEDURE — 2500000003 HC RX 250 WO HCPCS: Performed by: FAMILY MEDICINE

## 2018-02-19 PROCEDURE — 93005 ELECTROCARDIOGRAM TRACING: CPT

## 2018-02-19 PROCEDURE — P9016 RBC LEUKOCYTES REDUCED: HCPCS

## 2018-02-19 PROCEDURE — 86850 RBC ANTIBODY SCREEN: CPT

## 2018-02-19 PROCEDURE — 97116 GAIT TRAINING THERAPY: CPT

## 2018-02-19 PROCEDURE — 85025 COMPLETE CBC W/AUTO DIFF WBC: CPT

## 2018-02-19 PROCEDURE — 86900 BLOOD TYPING SEROLOGIC ABO: CPT

## 2018-02-19 PROCEDURE — 86901 BLOOD TYPING SEROLOGIC RH(D): CPT

## 2018-02-19 PROCEDURE — 97530 THERAPEUTIC ACTIVITIES: CPT

## 2018-02-19 PROCEDURE — 2580000003 HC RX 258: Performed by: STUDENT IN AN ORGANIZED HEALTH CARE EDUCATION/TRAINING PROGRAM

## 2018-02-19 PROCEDURE — 6370000000 HC RX 637 (ALT 250 FOR IP): Performed by: STUDENT IN AN ORGANIZED HEALTH CARE EDUCATION/TRAINING PROGRAM

## 2018-02-19 PROCEDURE — 36430 TRANSFUSION BLD/BLD COMPNT: CPT

## 2018-02-19 PROCEDURE — 85730 THROMBOPLASTIN TIME PARTIAL: CPT

## 2018-02-19 RX ORDER — LEVOFLOXACIN 500 MG/1
500 TABLET, FILM COATED ORAL DAILY
Qty: 7 TABLET | Refills: 0 | Status: SHIPPED | OUTPATIENT
Start: 2018-02-19 | End: 2018-02-26

## 2018-02-19 RX ORDER — WARFARIN SODIUM 3 MG/1
3 TABLET ORAL DAILY
Qty: 4 TABLET | Refills: 0 | Status: SHIPPED | OUTPATIENT
Start: 2018-02-19 | End: 2018-03-12 | Stop reason: ALTCHOICE

## 2018-02-19 RX ORDER — WARFARIN SODIUM 2 MG/1
4 TABLET ORAL
Status: COMPLETED | OUTPATIENT
Start: 2018-02-19 | End: 2018-02-19

## 2018-02-19 RX ORDER — FERROUS SULFATE 325(65) MG
325 TABLET ORAL
Qty: 30 TABLET | Refills: 0 | Status: SHIPPED | OUTPATIENT
Start: 2018-02-20 | End: 2019-09-23 | Stop reason: ALTCHOICE

## 2018-02-19 RX ORDER — 0.9 % SODIUM CHLORIDE 0.9 %
250 INTRAVENOUS SOLUTION INTRAVENOUS ONCE
Status: COMPLETED | OUTPATIENT
Start: 2018-02-19 | End: 2018-02-19

## 2018-02-19 RX ORDER — 0.9 % SODIUM CHLORIDE 0.9 %
250 INTRAVENOUS SOLUTION INTRAVENOUS ONCE
Status: DISCONTINUED | OUTPATIENT
Start: 2018-02-19 | End: 2018-02-19 | Stop reason: HOSPADM

## 2018-02-19 RX ADMIN — POLYETHYLENE GLYCOL 3350 17 G: 17 POWDER, FOR SOLUTION ORAL at 08:59

## 2018-02-19 RX ADMIN — FAMOTIDINE 20 MG: 10 INJECTION, SOLUTION INTRAVENOUS at 08:58

## 2018-02-19 RX ADMIN — FERROUS SULFATE TAB 325 MG (65 MG ELEMENTAL FE) 325 MG: 325 (65 FE) TAB at 08:58

## 2018-02-19 RX ADMIN — SODIUM CHLORIDE 250 ML: 9 INJECTION, SOLUTION INTRAVENOUS at 15:58

## 2018-02-19 RX ADMIN — DICLOFENAC 2 G: 10 GEL TOPICAL at 14:36

## 2018-02-19 RX ADMIN — Medication 10 ML: at 08:59

## 2018-02-19 RX ADMIN — ROPINIROLE 0.5 MG: 0.5 TABLET, FILM COATED ORAL at 14:36

## 2018-02-19 RX ADMIN — ATORVASTATIN CALCIUM 20 MG: 20 TABLET, FILM COATED ORAL at 08:57

## 2018-02-19 RX ADMIN — WARFARIN SODIUM 4 MG: 2 TABLET ORAL at 18:02

## 2018-02-19 RX ADMIN — ROPINIROLE 0.5 MG: 0.5 TABLET, FILM COATED ORAL at 08:59

## 2018-02-19 RX ADMIN — ENOXAPARIN SODIUM 80 MG: 80 INJECTION SUBCUTANEOUS at 18:02

## 2018-02-19 RX ADMIN — SODIUM CHLORIDE 250 ML: 0.9 INJECTION, SOLUTION INTRAVENOUS at 15:59

## 2018-02-19 ASSESSMENT — ENCOUNTER SYMPTOMS
ABDOMINAL PAIN: 0
SHORTNESS OF BREATH: 1
DIARRHEA: 0
NAUSEA: 0
WHEEZING: 0
CONSTIPATION: 1
DOUBLE VISION: 0
COUGH: 0
BLURRED VISION: 0
SPUTUM PRODUCTION: 0
VOMITING: 0

## 2018-02-19 ASSESSMENT — PAIN DESCRIPTION - DESCRIPTORS: DESCRIPTORS: ACHING;SHARP

## 2018-02-19 ASSESSMENT — PAIN DESCRIPTION - ONSET: ONSET: ON-GOING

## 2018-02-19 ASSESSMENT — PAIN DESCRIPTION - ORIENTATION: ORIENTATION: LEFT

## 2018-02-19 ASSESSMENT — PAIN SCALES - GENERAL: PAINLEVEL_OUTOF10: 5

## 2018-02-19 ASSESSMENT — PAIN DESCRIPTION - PAIN TYPE: TYPE: CHRONIC PAIN

## 2018-02-19 ASSESSMENT — PAIN DESCRIPTION - FREQUENCY: FREQUENCY: CONTINUOUS

## 2018-02-19 NOTE — PLAN OF CARE
Problem: Falls - Risk of  Goal: Absence of falls  Outcome: Ongoing  Pt resting comfortable in bed at this time. No distress noted. Assessment remains as charted. Pt remains free from falls this shift. Bed in low position, call light in reach, side rails up x 2, Cont to monior closely    Problem: Airway Clearance - Ineffective:  Goal: Clear lung sounds  Clear lung sounds   Outcome: Ongoing  No resp distress noted this shift    Problem: Fluid Volume - Deficit:  Goal: Achieves intake and output within specified parameters  Achieves intake and output within specified parameters   Outcome: Ongoing  Monitor I & O's    Problem: Gas Exchange - Impaired:  Goal: Levels of oxygenation will improve  Levels of oxygenation will improve   Outcome: Ongoing      Problem: ABCDS Injury Assessment  Goal: Absence of physical injury  Outcome: Ongoing  No injury noted this shift. Pt uses call light appropriately    Problem: Pain:  Goal: Pain level will decrease  Pain level will decrease   Outcome: Ongoing  See MAR, Give meds as needed  Assist pt with repositioning for comfort.   Cont to monitor closely  Goal: Control of acute pain  Control of acute pain   Outcome: Ongoing    Goal: Control of chronic pain  Control of chronic pain   Outcome: Ongoing

## 2018-02-19 NOTE — PLAN OF CARE
Problem: Falls - Risk of  Goal: Absence of falls  Outcome: Met This Shift  Patient remained free from falls and injury per shift; call light within reach, bed kept at lowest position, pathway clear, hourly rounds implemented. Problem: Airway Clearance - Ineffective:  Goal: Ability to maintain a clear airway will improve  Ability to maintain a clear airway will improve   Outcome: Met This Shift      Problem: Gas Exchange - Impaired:  Goal: Levels of oxygenation will improve  Levels of oxygenation will improve   Outcome: Met This Shift  Patient's SpO2 % on RA.     Problem: Pain:  Goal: Pain level will decrease  Pain level will decrease   Outcome: Met This Shift

## 2018-02-19 NOTE — PROGRESS NOTES
Physical Therapy    Facility/Department: [facility] PROGRESSIVE CARE  Initial Assessment    NAME: Pedro Fleischer  : 1950  MRN: [id number]    Date of Service: 2018    Patient Diagnosis(es): The primary encounter diagnosis was Pneumonia due to organism. Diagnoses of Sepsis, due to unspecified organism ([address] Dehydration, and Subtherapeutic international normalized ratio (INR) were also pertinent to this visit. has a past medical history of Chronic kidney disease; Diabetes ([address] Hyperlipidemia; Hypertension; Neuropathy ([address] Osteoarthritis; Pulmonary embolism ([address] Type 2 diabetes mellitus without complication ([facility] and Vocal cord paralysis. has a past surgical history that includes Foot surgery; Colonoscopy; Breast surgery; joint replacement (Bilateral); and other surgical history. Restrictions  Restrictions/Precautions  Restrictions/Precautions: Fall Risk  Required Braces or Orthoses?: No  Implants present? : Metal implants (bilateral THRs)  Vision/Hearing  Vision: Impaired  Vision Exceptions: Wears glasses at all times  Hearing: Within functional limits     Subjective  General  Patient assessed for rehabilitation services?: Yes  Additional Pertinent Hx: hx vocal cord paralysis  Response To Previous Treatment: Not applicable  Family / Caregiver Present: Yes  Referring Practitioner: Dr. Jonas Wolf  Referral Date : 18  Diagnosis: pneumonia, sepsis, dehydration, subtherapuetic INR  Follows Commands: Within Functional Limits  Subjective  Subjective: pt reports that she is anemic and getting weaker. Pt reports that the weather has effected her ability to get around. Pain Screening  Patient Currently in Pain: Yes  Pain Assessment  Pain Assessment: 0-10  Pain Level: 5 (5 at rest, 10 w/ movement)  Pain Type: Chronic pain  Pain Location: Shoulder;Generalized;Hip (all over- weather effecting arthritic joints)  Pain Orientation: Left  Pain Descriptors: Aching; Sharp (ache at rest, sharp w/ movement)  Pain

## 2018-02-19 NOTE — PROGRESS NOTES
dysuria and urgency. Musculoskeletal: Positive for joint pain. Skin: Negative for rash. Neurological: Positive for weakness. Negative for dizziness, speech change, focal weakness and headaches. Psychiatric/Behavioral: The patient is nervous/anxious. Medications: Allergies:  No Known Allergies    Current Meds:   Scheduled Meds:    sodium chloride  250 mL Intravenous Once    ferrous sulfate  325 mg Oral Daily with breakfast    polyethylene glycol  17 g Oral Daily    lidocaine  1 patch Transdermal Daily    sodium chloride flush  10 mL Intravenous 2 times per day    atorvastatin  20 mg Oral Daily    rOPINIRole  0.5 mg Oral TID    sodium chloride flush  10 mL Intravenous 2 times per day    famotidine (PEPCID) injection  20 mg Intravenous Daily    insulin lispro  0-6 Units Subcutaneous TID WC    insulin lispro  0-3 Units Subcutaneous Nightly    warfarin (COUMADIN) daily dosing (placeholder)   Other RX Placeholder    levofloxacin  500 mg Intravenous Q24H     Continuous Infusions:    dextrose      heparin (porcine) 14 Units/kg/hr (02/18/18 2105)     PRN Meds: acetaminophen, diclofenac sodium, sodium chloride flush, sodium chloride flush, docusate sodium, ondansetron, potassium chloride **OR** potassium chloride **OR** potassium chloride, glucose, dextrose, glucagon (rDNA), dextrose, LORazepam    Data:     Past Medical History:   has a past medical history of Chronic kidney disease; Diabetes ([address] Hyperlipidemia; Hypertension; Neuropathy ([address] Osteoarthritis; Pulmonary embolism ([address] Type 2 diabetes mellitus without complication ([address] and Vocal cord paralysis. Social History:   reports that she has never smoked. She has never used smokeless tobacco. She reports that she does not drink alcohol or use drugs.      Family History:   Family History   Problem Relation Age of Onset    Kidney Disease Mother     Cancer Father     Arthritis Brother        Vitals:  BP (!) 107/58   Pulse 98 Temp 98.4 °F (36.9 °C) (Oral)   Resp 16   Ht 5' 3\" (1.6 m)   Wt 175 lb 11.3 oz (79.7 kg)   SpO2 98%   BMI 31.13 kg/m²   Temp (24hrs), Av °F (36.7 °C), Min:97.3 °F (36.3 °C), Max:98.4 °F (36.9 °C)    Recent Labs      18   1121  18   1628  18   2125  18   0801   POCGLU  137*  194*  114*  122*       I/O (24Hr): Intake/Output Summary (Last 24 hours) at 18 1004  Last data filed at 18 3228   Gross per 24 hour   Intake              792 ml   Output             2675 ml   Net            -1883 ml       Labs:    Recent Labs      18   0415  18   0552  18   1600   WBC  7.8  11.4*  13.3*   HGB  7.3*  7.6*  9.1*   HCT  23.1*  23.7*  29.5*   MCV  78.6*  77.6*  78.5*   PLT  472*  484*  570*     Lab Results   Component Value Date     2018    K 4.3 2018     2018    CO2 21 2018    BUN 14 2018    CREATININE 0.85 2018    GLUCOSE 125 2018    CALCIUM 8.8 2018     Lab Results   Component Value Date    INR 1.2 2018     Lab Results   Component Value Date/Time    SPECIAL NOT REPORTED 2018 10:01 AM     Lab Results   Component Value Date/Time    CULTURE CULTURE IN PROGRESS 2018 10:01 AM    CULTURE  2018 10:01 AM     Performed at [address] [phone]     Radiology:    Xr Chest Standard (2 Vw)    Result Date: 2018  EXAMINATION: TWO VIEWS OF THE CHEST 2018 COMPARISON: None HISTORY: ORDERING SYSTEM PROVIDED HISTORY: SOB TECHNOLOGIST PROVIDED HISTORY: Reason for exam:->SOB Ordering Physician Provided Reason for Exam: SOB Acuity: Unknown Type of Exam: Initial FINDINGS: Prominent elevation of the right hemidiaphragm. Left basilar airspace opacity, potentially in the lingula. No findings of pneumothorax or pleural effusion. Normal mediastinal, hilar, and cardiac contours. No acute fracture.      1. Left basilar airspace opacity potentially in the lingula, potentially atelectasis or pneumonia. 2. Prominent elevation the right hemidiaphragm suggestive of right phrenic nerve palsy. Physical Examination:        Physical Exam   Constitutional: She is oriented to person, place, and time and well-developed, well-nourished, and in no distress. No distress. HENT:   Head: Normocephalic and atraumatic. Eyes: Pupils are equal, round, and reactive to light. Neck: Normal range of motion. Neck supple. No JVD present. Cardiovascular: Regular rhythm and intact distal pulses. Tachycardia present. Pulmonary/Chest: Effort normal and breath sounds normal. No respiratory distress. She exhibits tenderness. Abdominal: Soft. Bowel sounds are normal. She exhibits no distension. There is no tenderness. Neurological: She is alert and oriented to person, place, and time. Skin: Skin is warm and dry. She is not diaphoretic. Psychiatric: Affect and judgment normal. Her mood appears anxious. Vitals reviewed.     Assessment:        Primary Problem  Sepsis [facility])    Active Hospital Problems    Diagnosis Date Noted    Mild protein-calorie malnutrition ([facility] [E44.1] 02/19/2018    Pneumonia [J18.9] 02/17/2018    Sepsis ([facility] [A41.9] 02/17/2018    Constipation [K59.00] 02/17/2018    UTI (urinary tract infection) [N39.0] 02/17/2018    BLANE (acute kidney injury) ([facility] [N17.9] 02/17/2018    Hx of pulmonary embolus [Z86.711] 02/17/2018    Essential (primary) hypertension [I10] 03/13/2017    Restless leg syndrome [G25.81] 03/13/2017    Gastroesophageal reflux disease without esophagitis [K21.9] 03/13/2017    Osteoarthritis [M19.90] 06/26/2013       Plan:        Patient status Admit as inpatient in the  Progressive Unit/Step down     #Sepsis, UTI +/- Pneumonia, improving  -SOB, Tachypnic, tachycardic, leukocytosis, elevated lactate  -Recent hx UTI, on PO Cipro, UA here +UTI  -CXR possible pneumonia  -Cx ngtd, pneumonia work-up neg, Procalc 0.16  -IV Levaquin, transition to PO     #Hx PE w/cardiac arrest  -INR 1.6 today  -Pharmacy to dose coumadin  -Heparin ggt given INR subtherapeutic     #Iron Deficiency Anemia  -Hgb 9.1 at admission, MCV 78, Hgb 7.3 this AM likely baseline, pt initially dehydrated  -Iron low, TIBC wnl, Ferritin low normal but given acute sepsis it is likely falsely elevated  -Fe replacement  -FOBT ordered  -Transfuse one unit this AM    #Arthritis  -Osteo vs. RA? Per , pt diagnosed with RA in past, On Acetaminophen 4g/daily at home, has rheum appt in April  -Per EMR, work-up at [facility] in 2007, [place] and CCP wnl, no follow-up notes seen  -S/p bilateral hip replacement, pain and tenderness predominantly in shoulders, today radiates to mid chest  -Continue Tylenol, start topical NSAID, avoid oral NSAID given blood thinners and anemia    #Tachycardia, intermittent  -Ventricular bigeminy 2/18, rate 120s on monitor, sinus tach last night 120s  -Likely 2/2 sepsis and anemia, trops negative    #BLANE, resolved  -Cr 0.85 from 1.4 at admission     #HTN  -Stable, low normal  -Holding lisinopril     #T2DM  -Hold orals, has been hypoglycemic, poor PO intake  -SSI     #Mild protein calorie malnutrition  -Dietician on case    DISPO: If stable after blood transfusion, discharge home today on PO abx, Lovenox, w/Coumadin Clinic f/u, SW following. Eneida Sarabia MD  2/19/2018  10:04 AM   Attending Physician Statement    I have discussed the case of Rita Dowd, including pertinent history and exam findings with the resident. I have seen and examined the patient and the key elements of the encounter have been performed by me. I agree with the assessment, plan, and orders as documented by the resident. The patient is a long history of iron deficiency anemia she has had a recent GI workup done about 2 years ago which included EGD colonoscopic exam and  endoscopy of small amount.   Her hemoglobin did drop after hydration and she is a candidate for

## 2018-02-20 ENCOUNTER — CARE COORDINATION (OUTPATIENT)
Dept: CASE MANAGEMENT | Age: 68
End: 2018-02-20

## 2018-02-20 DIAGNOSIS — J18.9 PNEUMONIA OF BOTH LOWER LOBES DUE TO INFECTIOUS ORGANISM: Primary | ICD-10-CM

## 2018-02-20 LAB
ABO/RH: NORMAL
ANTIBODY SCREEN: NEGATIVE
ARM BAND NUMBER: NORMAL
BLD PROD TYP BPU: NORMAL
BLOOD BANK COMMENT: NORMAL
CROSSMATCH RESULT: NORMAL
DISPENSE STATUS BLOOD BANK: NORMAL
EXPIRATION DATE: NORMAL
TRANSFUSION STATUS: NORMAL
UNIT DIVISION: 0
UNIT NUMBER: NORMAL

## 2018-02-20 PROCEDURE — 1111F DSCHRG MED/CURRENT MED MERGE: CPT

## 2018-02-20 RX ORDER — LORAZEPAM 0.5 MG/1
TABLET ORAL
Qty: 60 TABLET | Refills: 0 | Status: SHIPPED | OUTPATIENT
Start: 2018-02-20 | End: 2018-03-19 | Stop reason: SDUPTHER

## 2018-02-20 NOTE — CARE COORDINATION
Is[facility] 45 Transitions Initial Follow Up Call    Call within 2 business days of discharge: Yes    Patient: Davey May Patient : 1950   MRN: [id number]  Reason for Admission: There are no discharge diagnoses documented for the most recent discharge.   Discharge Date: 18 RARS: [facility] Risk Score: 15     Spoke with: patient's     Facility: [address] [place] [address]    Non-face-to-face services provided:  Obtained and reviewed discharge summary and/or continuity of care documents  Assessment and support for treatment adherence and medication management-reviewed discharge medications, 1111F order completed    Care Transitions 24 Hour Call    Schedule Follow Up Appointment with PCP:  Declined  Do you have a copy of your discharge instructions?:  Yes  Do you have all of your prescriptions and are they filled?:  Yes  Have you been contacted by a [facility] Pharmacist?:  No  Have you scheduled your follow up appointment?:  No (Comment:  is calling today to schedule appointment)  Were you discharged with any Home Care or Post Acute Services:  No  Patient DME:  Straight cane, Other, Scooter  Other Patient DME:  glucometer  Do you have support at home?:  Partner/Spouse/SO  Do you feel like you have everything you need to keep you well at home?:  Yes  Care Transitions Interventions     Patient's  stated patient is doing well, no needs or concerns at this time, no vns, has all her medications 1111F order completed,  will schedule appointment, refused writer's offer to assist with scheduling appointment, agreed to care transitions, will follow//JU    Follow Up  Future Appointments  Date Time Provider Rachael Garsia   3/26/2018 10:10 AM Tia Vazquez MD [facility] None       Kyle Rubio RN

## 2018-02-20 NOTE — DISCHARGE SUMMARY
[address]    Discharge Summary     Patient ID: Pedro Fleischer  :  1950   MRN: [id number]     ACCOUNT:  [de-identified]   Patient's PCP: Cintia Alves CNP  Admit Date: 2018   Discharge Date: 2018     Length of Stay: 2  Code Status:  Prior  Admitting Physician: Melvin Ashton MD  Discharge Physician: Renetta Garcia MD     Active Discharge Diagnoses:     Primary Problem  Sepsis [facility])      [place] Problems    Diagnosis Date Noted    Mild protein-calorie malnutrition ([facility] [E44.1] 2018    Pneumonia due to organism [J18.9]     Pneumonia [J18.9] 2018    Sepsis ([facility] [A41.9] 2018    Constipation [K59.00] 2018    UTI (urinary tract infection) [N39.0] 2018    BLANE (acute kidney injury) ([facility] [N17.9] 2018    Hx of pulmonary embolus [Z86.711] 2018    Essential (primary) hypertension [I10] 2017    Restless leg syndrome [G25.81] 2017    Gastroesophageal reflux disease without esophagitis [K21.9] 2017    Osteoarthritis [M19.90] 2013       Admission Condition:  poor     Discharged Condition: good    Hospital Stay:     Hospital Course:  Pedro Fleischer is a 75 y.o. female who was admitted for the management of  Sepsis ([facility] , presented to ER with Dehydration; Shortness of Breath; and Constipation    Patient with a history of PE on coumadin, vocal cord paralysis, T2DM, arthritis, and iron deficiency anemia who presented with worsening body aches, SOB and generalized weakness. Symptoms started 4-5 weeks ago, have been worsening. She was recently seen by PCP, +UTI, started on PO Cipro. In ER, patient afebrile, but tachypnic, tachycardic, O2 saturation wnl on RA. WBC 13K, HGB 9.1, Cr 1.43, INR 1.2, Lactic acid 2.4. Cultures drawn. Patient given IVF bolus, rocephin and levaquin. CXR atelectasis vs. Pneumonia. UA with +UTI.  Heparin ggt 2/2 subtherapeutic INR. Abx transitioned to PO. Cultures NGTD. After dehydration treated, hgb 7.3, hgb initially falsely elevated 2/2 hemoconcentration, given symptoms of SOB and weakness, transfused 1 unit pRBCs. Iron studies show iron deficiency, started on PO iron. Arthritis treated with acetaminophen and diclofenac gel. Patient's symptoms improved. Patient discharged with PO course of levaquin, 3 days of lovenox, PO iron, and diclofenac gel. Amaryl was discontinued given low sugars and presenting symptoms, latest A1C wnl. Defer to PCP to restart if necessary. Instructed to take 3mg coumadin for next three days and see PCP 2/23 for INR re-check, coumadin adjustment, and hospital follow-up. Patient may benefit from Venofer if unable to tolerate PO iron. Significant therapeutic interventions: 1 unit pRBCs    Significant Diagnostic Studies:   Labs / Micro:  Recent Labs      02/19/18   0415  02/18/18   0552  02/17/18   1600   WBC  7.8  11.4*  13.3*   HGB  7.3*  7.6*  9.1*   HCT  23.1*  23.7*  29.5*   MCV  78.6*  77.6*  78.5*   PLT  472*  484*  570*     Lab Results   Component Value Date     02/19/2018    K 4.3 02/19/2018     02/19/2018    CO2 21 02/19/2018    BUN 14 02/19/2018    CREATININE 0.85 02/19/2018    GLUCOSE 125 02/19/2018    CALCIUM 8.8 02/19/2018      Radiology:  Xr Chest Standard (2 Vw)    Result Date: 2/17/2018  EXAMINATION: TWO VIEWS OF THE CHEST 2/17/2018 COMPARISON: None HISTORY: ORDERING SYSTEM PROVIDED HISTORY: SOB TECHNOLOGIST PROVIDED HISTORY: Reason for exam:->SOB Ordering Physician Provided Reason for Exam: SOB Acuity: Unknown Type of Exam: Initial FINDINGS: Prominent elevation of the right hemidiaphragm. Left basilar airspace opacity, potentially in the lingula. No findings of pneumothorax or pleural effusion. Normal mediastinal, hilar, and cardiac contours. No acute fracture. 1. Left basilar airspace opacity potentially in the lingula, potentially atelectasis or pneumonia. acetaminophen  What changed:  Another medication with the same name was removed. Continue taking this medication, and follow the directions you see here. warfarin 3 MG tablet  Commonly known as:  COUMADIN  Take 1 tablet by mouth daily Take 3 mg 2/20, 2/21, 2/22 repeat INR 2/23 to determine further dosing  What changed:  additional instructions        CONTINUE taking these medications    ACCU-CHEK SOFTCLIX LANCETS Misc  Use 2 TIMES DAILY. Dx: E11.22, N18.3. Type II DM     Alcohol Swabs Pads  Need to use  twice a day     ALLERGY RELIEF 25 MG tablet  Generic drug:  diphenhydrAMINE     atorvastatin 20 MG tablet  Commonly known as:  LIPITOR  Take 1 tablet by mouth daily     Blood Glucose Monitoring Suppl Carolyn  Glucometer Accu check. Test Blood Sugars 2 Times Daily. Dx: E 11.22, N 18.3 type II DM     BLOOD GLUCOSE TEST STRIPS Strp  Test_2__times daily  Diagnosis: 250.0   Diabetes Mellitus______Non-Insulin Dependent     docusate sodium 100 MG capsule  Commonly known as:  COLACE  Take 1 capsule by mouth 2 times daily     famotidine 20 MG tablet  Commonly known as:  PEPCID  Take 1 tablet by mouth 2 times daily     lisinopril-hydrochlorothiazide 20-25 MG per tablet  Commonly known as:  PRINZIDE;ZESTORETIC  Take 1 tablet by mouth daily     LORazepam 0.5 MG tablet  Commonly known as:  ATIVAN     metFORMIN 500 MG extended release tablet  Commonly known as:  GLUCOPHAGE-XR  Take 1 tablet by mouth 2 times daily (before meals) Take 500 mg a day for 2 weeks, then increase the dose to twice a day.      omeprazole 20 MG delayed release capsule  Commonly known as:  PRILOSEC  Take 1 capsule by mouth Daily     rOPINIRole 0.5 MG tablet  Commonly known as:  REQUIP  Take 1 tablet by mouth 3 times daily        STOP taking these medications    ciprofloxacin 500 MG tablet  Commonly known as:  CIPRO     glimepiride 4 MG tablet  Commonly known as:  AMARYL           Where to Get Your Medications      These medications were sent to [facility]

## 2018-02-21 ENCOUNTER — CARE COORDINATION (OUTPATIENT)
Dept: CARE COORDINATION | Age: 68
End: 2018-02-21

## 2018-02-23 ENCOUNTER — HOSPITAL ENCOUNTER (OUTPATIENT)
Age: 68
Discharge: HOME | End: 2018-02-23
Payer: MEDICARE

## 2018-02-23 ENCOUNTER — OFFICE VISIT (OUTPATIENT)
Dept: PRIMARY CARE CLINIC | Age: 68
End: 2018-02-23
Payer: MEDICARE

## 2018-02-23 VITALS
RESPIRATION RATE: 16 BRPM | SYSTOLIC BLOOD PRESSURE: 112 MMHG | DIASTOLIC BLOOD PRESSURE: 70 MMHG | WEIGHT: 171.6 LBS | OXYGEN SATURATION: 94 % | BODY MASS INDEX: 30.41 KG/M2 | HEIGHT: 63 IN | HEART RATE: 113 BPM

## 2018-02-23 DIAGNOSIS — Z86.711 HX OF PULMONARY EMBOLUS: ICD-10-CM

## 2018-02-23 DIAGNOSIS — Z86.718 HX OF BLOOD CLOTS: ICD-10-CM

## 2018-02-23 DIAGNOSIS — E44.1 MALNUTRITION OF MILD DEGREE (HCC): ICD-10-CM

## 2018-02-23 DIAGNOSIS — E86.0 DEHYDRATION: ICD-10-CM

## 2018-02-23 DIAGNOSIS — D50.8 IRON DEFICIENCY ANEMIA SECONDARY TO INADEQUATE DIETARY IRON INTAKE: ICD-10-CM

## 2018-02-23 DIAGNOSIS — N39.0 URINARY TRACT INFECTION WITHOUT HEMATURIA, SITE UNSPECIFIED: ICD-10-CM

## 2018-02-23 DIAGNOSIS — M25.551 BILATERAL HIP PAIN: Primary | ICD-10-CM

## 2018-02-23 DIAGNOSIS — F51.01 PRIMARY INSOMNIA: ICD-10-CM

## 2018-02-23 DIAGNOSIS — M25.552 BILATERAL HIP PAIN: Primary | ICD-10-CM

## 2018-02-23 DIAGNOSIS — D64.9 ANEMIA, UNSPECIFIED TYPE: ICD-10-CM

## 2018-02-23 LAB
ALBUMIN SERPL-MCNC: 3.8 G/DL (ref 3.5–5.2)
ALBUMIN/GLOBULIN RATIO: 1 (ref 1–2.5)
ALP BLD-CCNC: 73 U/L (ref 35–104)
ALT SERPL-CCNC: 29 U/L (ref 5–33)
ANION GAP SERPL CALCULATED.3IONS-SCNC: 20 MMOL/L (ref 9–17)
AST SERPL-CCNC: 24 U/L
BILIRUB SERPL-MCNC: 0.44 MG/DL (ref 0.3–1.2)
BUN BLDV-MCNC: 21 MG/DL (ref 8–23)
BUN/CREAT BLD: ABNORMAL (ref 9–20)
CALCIUM SERPL-MCNC: 10 MG/DL (ref 8.6–10.4)
CHLORIDE BLD-SCNC: 97 MMOL/L (ref 98–107)
CO2: 22 MMOL/L (ref 20–31)
CREAT SERPL-MCNC: 1.17 MG/DL (ref 0.5–0.9)
CULTURE: NORMAL
GFR AFRICAN AMERICAN: 56 ML/MIN
GFR NON-AFRICAN AMERICAN: 46 ML/MIN
GFR SERPL CREATININE-BSD FRML MDRD: ABNORMAL ML/MIN/{1.73_M2}
GFR SERPL CREATININE-BSD FRML MDRD: ABNORMAL ML/MIN/{1.73_M2}
GLUCOSE BLD-MCNC: 139 MG/DL (ref 70–99)
HCT VFR BLD CALC: 32.1 % (ref 36–46)
HEMOGLOBIN: 10.4 G/DL (ref 12–16)
INR BLD: 2.2
IRON SATURATION: 8 % (ref 20–55)
IRON: 26 UG/DL (ref 37–145)
Lab: NORMAL
Lab: NORMAL
MCH RBC QN AUTO: 25.1 PG (ref 26–34)
MCHC RBC AUTO-ENTMCNC: 32.4 G/DL (ref 31–37)
MCV RBC AUTO: 77.6 FL (ref 80–100)
NRBC AUTOMATED: ABNORMAL PER 100 WBC
PDW BLD-RTO: 17 % (ref 12.5–15.4)
PLATELET # BLD: 554 K/UL (ref 140–450)
PMV BLD AUTO: 7.4 FL (ref 6–12)
POTASSIUM SERPL-SCNC: 4.1 MMOL/L (ref 3.7–5.3)
PROTHROMBIN TIME: 21.6 SEC (ref 9.4–12.6)
RBC # BLD: 4.14 M/UL (ref 4–5.2)
SODIUM BLD-SCNC: 139 MMOL/L (ref 135–144)
SPECIMEN DESCRIPTION: NORMAL
STATUS: NORMAL
STATUS: NORMAL
TOTAL IRON BINDING CAPACITY: 330 UG/DL (ref 250–450)
TOTAL PROTEIN: 7.7 G/DL (ref 6.4–8.3)
UNSATURATED IRON BINDING CAPACITY: 304 UG/DL (ref 112–347)
WBC # BLD: 10.6 K/UL (ref 3.5–11)

## 2018-02-23 PROCEDURE — 36415 COLL VENOUS BLD VENIPUNCTURE: CPT

## 2018-02-23 PROCEDURE — 80053 COMPREHEN METABOLIC PANEL: CPT

## 2018-02-23 PROCEDURE — 83550 IRON BINDING TEST: CPT

## 2018-02-23 PROCEDURE — 85610 PROTHROMBIN TIME: CPT

## 2018-02-23 PROCEDURE — 83540 ASSAY OF IRON: CPT

## 2018-02-23 PROCEDURE — 85027 COMPLETE CBC AUTOMATED: CPT

## 2018-02-23 PROCEDURE — 99496 TRANSJ CARE MGMT HIGH F2F 7D: CPT | Performed by: NURSE PRACTITIONER

## 2018-02-26 ENCOUNTER — HOSPITAL ENCOUNTER (OUTPATIENT)
Age: 68
Discharge: HOME | End: 2018-02-28
Payer: MEDICARE

## 2018-02-26 ENCOUNTER — HOSPITAL ENCOUNTER (OUTPATIENT)
Dept: GENERAL RADIOLOGY | Age: 68
Discharge: HOME | End: 2018-02-28
Payer: MEDICARE

## 2018-02-26 ENCOUNTER — TELEPHONE (OUTPATIENT)
Dept: PRIMARY CARE CLINIC | Age: 68
End: 2018-02-26

## 2018-02-26 ENCOUNTER — HOSPITAL ENCOUNTER (OUTPATIENT)
Age: 68
Discharge: HOME | End: 2018-02-26
Payer: MEDICARE

## 2018-02-26 DIAGNOSIS — M25.552 BILATERAL HIP PAIN: ICD-10-CM

## 2018-02-26 DIAGNOSIS — M25.551 BILATERAL HIP PAIN: ICD-10-CM

## 2018-02-26 DIAGNOSIS — E11.9 TYPE 2 DIABETES MELLITUS WITHOUT COMPLICATION, WITHOUT LONG-TERM CURRENT USE OF INSULIN (HCC): ICD-10-CM

## 2018-02-26 LAB
-: NORMAL
AMORPHOUS: NORMAL
BACTERIA: NORMAL
BILIRUBIN URINE: NEGATIVE
CASTS UA: NORMAL /LPF (ref 0–8)
COLOR: YELLOW
COMMENT UA: ABNORMAL
CRYSTALS, UA: NORMAL /HPF
EPITHELIAL CELLS UA: NORMAL /HPF (ref 0–5)
GLUCOSE URINE: NEGATIVE
KETONES, URINE: NEGATIVE
LEUKOCYTE ESTERASE, URINE: ABNORMAL
MUCUS: NORMAL
NITRITE, URINE: NEGATIVE
OTHER OBSERVATIONS UA: NORMAL
PH UA: 5 (ref 5–8)
PROTEIN UA: NEGATIVE
RBC UA: NORMAL /HPF (ref 0–4)
RENAL EPITHELIAL, UA: NORMAL /HPF
SPECIFIC GRAVITY UA: 1.02 (ref 1–1.03)
TRICHOMONAS: NORMAL
TURBIDITY: CLEAR
URINE HGB: NEGATIVE
UROBILINOGEN, URINE: NORMAL
WBC UA: NORMAL /HPF (ref 0–5)
YEAST: NORMAL

## 2018-02-26 PROCEDURE — 86403 PARTICLE AGGLUT ANTBDY SCRN: CPT

## 2018-02-26 PROCEDURE — 73502 X-RAY EXAM HIP UNI 2-3 VIEWS: CPT

## 2018-02-26 PROCEDURE — 81001 URINALYSIS AUTO W/SCOPE: CPT

## 2018-02-26 PROCEDURE — 87086 URINE CULTURE/COLONY COUNT: CPT

## 2018-02-26 PROCEDURE — 87186 SC STD MICRODIL/AGAR DIL: CPT

## 2018-02-26 RX ORDER — METFORMIN HYDROCHLORIDE 500 MG/1
500 TABLET, EXTENDED RELEASE ORAL
Qty: 60 TABLET | Refills: 11 | Status: ON HOLD | OUTPATIENT
Start: 2018-02-26 | End: 2018-03-01 | Stop reason: SDUPTHER

## 2018-02-27 ENCOUNTER — TELEPHONE (OUTPATIENT)
Dept: PRIMARY CARE CLINIC | Age: 68
End: 2018-02-27

## 2018-02-27 ENCOUNTER — HOSPITAL ENCOUNTER (INPATIENT)
Age: 68
LOS: 3 days | Discharge: HOME | DRG: 683 | End: 2018-03-02
Attending: EMERGENCY MEDICINE | Admitting: INTERNAL MEDICINE
Payer: MEDICARE

## 2018-02-27 ENCOUNTER — APPOINTMENT (OUTPATIENT)
Dept: GENERAL RADIOLOGY | Age: 68
DRG: 683 | End: 2018-02-27
Payer: MEDICARE

## 2018-02-27 DIAGNOSIS — N17.9 AKI (ACUTE KIDNEY INJURY) (HCC): Primary | ICD-10-CM

## 2018-02-27 PROBLEM — R79.1 SUPRATHERAPEUTIC INR: Status: ACTIVE | Noted: 2018-02-27

## 2018-02-27 LAB
ABO/RH: NORMAL
ABSOLUTE EOS #: 0.11 K/UL (ref 0–0.4)
ABSOLUTE IMMATURE GRANULOCYTE: ABNORMAL K/UL (ref 0–0.3)
ABSOLUTE LYMPH #: 0.45 K/UL (ref 1–4.8)
ABSOLUTE MONO #: 0.56 K/UL (ref 0.1–1.3)
ALBUMIN SERPL-MCNC: 3.7 G/DL (ref 3.5–5.2)
ALBUMIN/GLOBULIN RATIO: ABNORMAL (ref 1–2.5)
ALP BLD-CCNC: 68 U/L (ref 35–104)
ALT SERPL-CCNC: 23 U/L (ref 5–33)
ANION GAP SERPL CALCULATED.3IONS-SCNC: 20 MMOL/L (ref 9–17)
ANTIBODY SCREEN: NEGATIVE
ARM BAND NUMBER: NORMAL
AST SERPL-CCNC: 22 U/L
BASOPHILS # BLD: 1 % (ref 0–2)
BASOPHILS ABSOLUTE: 0.11 K/UL (ref 0–0.2)
BILIRUB SERPL-MCNC: 0.24 MG/DL (ref 0.3–1.2)
BUN BLDV-MCNC: 40 MG/DL (ref 8–23)
BUN/CREAT BLD: ABNORMAL (ref 9–20)
C-REACTIVE PROTEIN: 86.5 MG/L (ref 0–5)
CALCIUM SERPL-MCNC: 9.5 MG/DL (ref 8.6–10.4)
CHLORIDE BLD-SCNC: 96 MMOL/L (ref 98–107)
CO2: 19 MMOL/L (ref 20–31)
CREAT SERPL-MCNC: 2.4 MG/DL (ref 0.5–0.9)
CULTURE: ABNORMAL
CULTURE: ABNORMAL
DIFFERENTIAL TYPE: ABNORMAL
EOSINOPHILS RELATIVE PERCENT: 1 % (ref 0–4)
EXPIRATION DATE: NORMAL
GFR AFRICAN AMERICAN: 24 ML/MIN
GFR NON-AFRICAN AMERICAN: 20 ML/MIN
GFR SERPL CREATININE-BSD FRML MDRD: ABNORMAL ML/MIN/{1.73_M2}
GFR SERPL CREATININE-BSD FRML MDRD: ABNORMAL ML/MIN/{1.73_M2}
GLUCOSE BLD-MCNC: 163 MG/DL (ref 70–99)
HCT VFR BLD CALC: 26.5 % (ref 36–46)
HCT VFR BLD CALC: 31.6 % (ref 36–46)
HEMOGLOBIN: 8.6 G/DL (ref 12–16)
HEMOGLOBIN: 9.9 G/DL (ref 12–16)
IMMATURE GRANULOCYTES: ABNORMAL %
INR BLD: 3.9
LACTIC ACID: 1.2 MMOL/L (ref 0.5–2.2)
LYMPHOCYTES # BLD: 4 % (ref 24–44)
Lab: ABNORMAL
MCH RBC QN AUTO: 24.5 PG (ref 26–34)
MCHC RBC AUTO-ENTMCNC: 31.4 G/DL (ref 31–37)
MCV RBC AUTO: 77.8 FL (ref 80–100)
MONOCYTES # BLD: 5 % (ref 1–7)
MORPHOLOGY: ABNORMAL
NRBC AUTOMATED: ABNORMAL PER 100 WBC
ORGANISM: ABNORMAL
PARTIAL THROMBOPLASTIN TIME: 42.6 SEC (ref 23–31)
PDW BLD-RTO: 16.9 % (ref 11.5–14.9)
PLATELET # BLD: 560 K/UL (ref 150–450)
PLATELET ESTIMATE: ABNORMAL
PMV BLD AUTO: 8.2 FL (ref 6–12)
POTASSIUM SERPL-SCNC: 4.9 MMOL/L (ref 3.7–5.3)
PROCALCITONIN: 0.15 NG/ML
PROTHROMBIN TIME: 37.7 SEC (ref 9.7–12)
RBC # BLD: 4.06 M/UL (ref 4–5.2)
RBC # BLD: ABNORMAL 10*6/UL
SEG NEUTROPHILS: 89 % (ref 36–66)
SEGMENTED NEUTROPHILS ABSOLUTE COUNT: 9.97 K/UL (ref 1.3–9.1)
SODIUM BLD-SCNC: 135 MMOL/L (ref 135–144)
SODIUM,UR: 64 MMOL/L
SPECIMEN DESCRIPTION: ABNORMAL
STATUS: ABNORMAL
TOTAL PROTEIN: 7.5 G/DL (ref 6.4–8.3)
WBC # BLD: 11.2 K/UL (ref 3.5–11)
WBC # BLD: ABNORMAL 10*3/UL

## 2018-02-27 PROCEDURE — 86738 MYCOPLASMA ANTIBODY: CPT

## 2018-02-27 PROCEDURE — 99285 EMERGENCY DEPT VISIT HI MDM: CPT

## 2018-02-27 PROCEDURE — 87449 NOS EACH ORGANISM AG IA: CPT

## 2018-02-27 PROCEDURE — 2580000003 HC RX 258: Performed by: EMERGENCY MEDICINE

## 2018-02-27 PROCEDURE — 87040 BLOOD CULTURE FOR BACTERIA: CPT

## 2018-02-27 PROCEDURE — 85730 THROMBOPLASTIN TIME PARTIAL: CPT

## 2018-02-27 PROCEDURE — 86850 RBC ANTIBODY SCREEN: CPT

## 2018-02-27 PROCEDURE — 85610 PROTHROMBIN TIME: CPT

## 2018-02-27 PROCEDURE — 84145 PROCALCITONIN (PCT): CPT

## 2018-02-27 PROCEDURE — 87899 AGENT NOS ASSAY W/OPTIC: CPT

## 2018-02-27 PROCEDURE — 36415 COLL VENOUS BLD VENIPUNCTURE: CPT

## 2018-02-27 PROCEDURE — 85014 HEMATOCRIT: CPT

## 2018-02-27 PROCEDURE — 85025 COMPLETE CBC W/AUTO DIFF WBC: CPT

## 2018-02-27 PROCEDURE — 2060000000 HC ICU INTERMEDIATE R&B

## 2018-02-27 PROCEDURE — 86901 BLOOD TYPING SEROLOGIC RH(D): CPT

## 2018-02-27 PROCEDURE — 84300 ASSAY OF URINE SODIUM: CPT

## 2018-02-27 PROCEDURE — 6370000000 HC RX 637 (ALT 250 FOR IP): Performed by: STUDENT IN AN ORGANIZED HEALTH CARE EDUCATION/TRAINING PROGRAM

## 2018-02-27 PROCEDURE — 85018 HEMOGLOBIN: CPT

## 2018-02-27 PROCEDURE — 86140 C-REACTIVE PROTEIN: CPT

## 2018-02-27 PROCEDURE — 80053 COMPREHEN METABOLIC PANEL: CPT

## 2018-02-27 PROCEDURE — 83605 ASSAY OF LACTIC ACID: CPT

## 2018-02-27 PROCEDURE — 6360000002 HC RX W HCPCS: Performed by: STUDENT IN AN ORGANIZED HEALTH CARE EDUCATION/TRAINING PROGRAM

## 2018-02-27 PROCEDURE — 82947 ASSAY GLUCOSE BLOOD QUANT: CPT

## 2018-02-27 PROCEDURE — 99222 1ST HOSP IP/OBS MODERATE 55: CPT | Performed by: INTERNAL MEDICINE

## 2018-02-27 PROCEDURE — 86900 BLOOD TYPING SEROLOGIC ABO: CPT

## 2018-02-27 PROCEDURE — 2580000003 HC RX 258: Performed by: STUDENT IN AN ORGANIZED HEALTH CARE EDUCATION/TRAINING PROGRAM

## 2018-02-27 PROCEDURE — 71045 X-RAY EXAM CHEST 1 VIEW: CPT

## 2018-02-27 RX ORDER — ROPINIROLE 0.5 MG/1
0.5 TABLET, FILM COATED ORAL 3 TIMES DAILY
Status: DISCONTINUED | OUTPATIENT
Start: 2018-02-27 | End: 2018-03-02 | Stop reason: HOSPADM

## 2018-02-27 RX ORDER — 0.9 % SODIUM CHLORIDE 0.9 %
1000 INTRAVENOUS SOLUTION INTRAVENOUS ONCE
Status: COMPLETED | OUTPATIENT
Start: 2018-02-27 | End: 2018-02-27

## 2018-02-27 RX ORDER — SODIUM CHLORIDE 0.9 % (FLUSH) 0.9 %
10 SYRINGE (ML) INJECTION PRN
Status: DISCONTINUED | OUTPATIENT
Start: 2018-02-27 | End: 2018-03-02 | Stop reason: HOSPADM

## 2018-02-27 RX ORDER — SENNOSIDES 8.6 MG
1300 CAPSULE ORAL 2 TIMES DAILY
Status: DISCONTINUED | OUTPATIENT
Start: 2018-02-27 | End: 2018-02-27 | Stop reason: SDUPTHER

## 2018-02-27 RX ORDER — ONDANSETRON 2 MG/ML
4 INJECTION INTRAMUSCULAR; INTRAVENOUS EVERY 6 HOURS PRN
Status: DISCONTINUED | OUTPATIENT
Start: 2018-02-27 | End: 2018-03-02 | Stop reason: HOSPADM

## 2018-02-27 RX ORDER — DEXTROSE MONOHYDRATE 25 G/50ML
12.5 INJECTION, SOLUTION INTRAVENOUS PRN
Status: DISCONTINUED | OUTPATIENT
Start: 2018-02-27 | End: 2018-03-02 | Stop reason: HOSPADM

## 2018-02-27 RX ORDER — FERROUS SULFATE 325(65) MG
325 TABLET ORAL
Status: DISCONTINUED | OUTPATIENT
Start: 2018-02-28 | End: 2018-02-28

## 2018-02-27 RX ORDER — SODIUM CHLORIDE 9 MG/ML
INJECTION, SOLUTION INTRAVENOUS CONTINUOUS
Status: DISCONTINUED | OUTPATIENT
Start: 2018-02-27 | End: 2018-03-02 | Stop reason: HOSPADM

## 2018-02-27 RX ORDER — LEVOFLOXACIN 5 MG/ML
250 INJECTION, SOLUTION INTRAVENOUS EVERY 24 HOURS
Status: DISCONTINUED | OUTPATIENT
Start: 2018-02-28 | End: 2018-02-28

## 2018-02-27 RX ORDER — DEXTROSE MONOHYDRATE 50 MG/ML
100 INJECTION, SOLUTION INTRAVENOUS PRN
Status: DISCONTINUED | OUTPATIENT
Start: 2018-02-27 | End: 2018-03-02 | Stop reason: HOSPADM

## 2018-02-27 RX ORDER — SODIUM CHLORIDE 0.9 % (FLUSH) 0.9 %
10 SYRINGE (ML) INJECTION EVERY 12 HOURS SCHEDULED
Status: DISCONTINUED | OUTPATIENT
Start: 2018-02-27 | End: 2018-03-02 | Stop reason: HOSPADM

## 2018-02-27 RX ORDER — ATORVASTATIN CALCIUM 20 MG/1
20 TABLET, FILM COATED ORAL NIGHTLY
Status: DISCONTINUED | OUTPATIENT
Start: 2018-02-27 | End: 2018-03-02 | Stop reason: HOSPADM

## 2018-02-27 RX ORDER — NICOTINE POLACRILEX 4 MG
15 LOZENGE BUCCAL PRN
Status: DISCONTINUED | OUTPATIENT
Start: 2018-02-27 | End: 2018-03-02 | Stop reason: HOSPADM

## 2018-02-27 RX ORDER — ACETAMINOPHEN 325 MG/1
650 TABLET ORAL EVERY 4 HOURS PRN
Status: DISCONTINUED | OUTPATIENT
Start: 2018-02-27 | End: 2018-03-02 | Stop reason: HOSPADM

## 2018-02-27 RX ORDER — HEPARIN SODIUM 5000 [USP'U]/ML
5000 INJECTION, SOLUTION INTRAVENOUS; SUBCUTANEOUS EVERY 8 HOURS SCHEDULED
Status: DISCONTINUED | OUTPATIENT
Start: 2018-02-27 | End: 2018-02-28

## 2018-02-27 RX ORDER — FAMOTIDINE 20 MG/1
20 TABLET, FILM COATED ORAL DAILY
Status: DISCONTINUED | OUTPATIENT
Start: 2018-02-27 | End: 2018-03-02

## 2018-02-27 RX ORDER — LORAZEPAM 0.5 MG/1
0.5 TABLET ORAL 2 TIMES DAILY PRN
Status: DISCONTINUED | OUTPATIENT
Start: 2018-02-27 | End: 2018-03-02 | Stop reason: HOSPADM

## 2018-02-27 RX ORDER — LEVOFLOXACIN 5 MG/ML
500 INJECTION, SOLUTION INTRAVENOUS ONCE
Status: COMPLETED | OUTPATIENT
Start: 2018-02-27 | End: 2018-02-27

## 2018-02-27 RX ADMIN — ROPINIROLE 0.5 MG: 0.5 TABLET, FILM COATED ORAL at 20:24

## 2018-02-27 RX ADMIN — SODIUM CHLORIDE: 9 INJECTION, SOLUTION INTRAVENOUS at 19:07

## 2018-02-27 RX ADMIN — ATORVASTATIN CALCIUM 20 MG: 20 TABLET, FILM COATED ORAL at 20:18

## 2018-02-27 RX ADMIN — INSULIN LISPRO 1 UNITS: 100 INJECTION, SOLUTION INTRAVENOUS; SUBCUTANEOUS at 21:21

## 2018-02-27 RX ADMIN — FAMOTIDINE 20 MG: 20 TABLET, FILM COATED ORAL at 19:07

## 2018-02-27 RX ADMIN — ACETAMINOPHEN 650 MG: 325 TABLET, FILM COATED ORAL at 20:18

## 2018-02-27 RX ADMIN — LORAZEPAM 0.5 MG: 0.5 TABLET ORAL at 23:00

## 2018-02-27 RX ADMIN — SODIUM CHLORIDE 1000 ML: 9 INJECTION, SOLUTION INTRAVENOUS at 16:21

## 2018-02-27 RX ADMIN — LEVOFLOXACIN 500 MG: 5 INJECTION, SOLUTION INTRAVENOUS at 19:07

## 2018-02-27 ASSESSMENT — PAIN DESCRIPTION - LOCATION
LOCATION: SHOULDER
LOCATION: SHOULDER

## 2018-02-27 ASSESSMENT — PAIN SCALES - GENERAL
PAINLEVEL_OUTOF10: 1
PAINLEVEL_OUTOF10: 0
PAINLEVEL_OUTOF10: 2

## 2018-02-27 ASSESSMENT — ENCOUNTER SYMPTOMS
CONSTIPATION: 0
RESPIRATORY NEGATIVE: 1
WHEEZING: 0
BLURRED VISION: 0
GASTROINTESTINAL NEGATIVE: 1
NAUSEA: 0
HEMOPTYSIS: 0
EYES NEGATIVE: 1
BLOOD IN STOOL: 0
ABDOMINAL PAIN: 0
VOMITING: 0
SHORTNESS OF BREATH: 0
DIARRHEA: 1
COUGH: 1
BACK PAIN: 0
SPUTUM PRODUCTION: 1
HEARTBURN: 0

## 2018-02-27 ASSESSMENT — PAIN DESCRIPTION - PAIN TYPE
TYPE: CHRONIC PAIN
TYPE: CHRONIC PAIN

## 2018-02-27 NOTE — TELEPHONE ENCOUNTER
Pt's  called stating that the patient is not any better since she was seen on 2/23/18  Pt is weaker than before     Please advise     Health Maintenance   Topic Date Due    Hepatitis C screen  1950    DTaP/Tdap/Td vaccine (1 - Tdap) 09/28/1968    Breast cancer screen  09/28/1999    Shingles Vaccine (1 of 2 - 2 Dose Series) 09/28/1999    Colon cancer screen colonoscopy  09/28/1999    DEXA (modify frequency per FRAX score)  09/28/2014    Pneumococcal low/med risk (1 of 2 - PCV13) 09/28/2014    Diabetic foot exam  11/13/2018    A1C test (Diabetic or Prediabetic)  11/13/2018    Lipid screen  11/13/2018    Diabetic retinal exam  12/11/2018    Diabetic microalbuminuria test  02/13/2019    Potassium monitoring  02/23/2019    Creatinine monitoring  02/23/2019    Flu vaccine  Completed             (applicable per patient's age: Cancer Screenings, Depression Screening, Fall Risk Screening, Immunizations)    Hemoglobin A1C (%)   Date Value   11/13/2017 6.3   06/20/2017 14   03/23/2014 5.5     LDL Calculated (mg/dL)   Date Value   11/13/2017 74     AST (U/L)   Date Value   02/23/2018 24     ALT (U/L)   Date Value   02/23/2018 29     BUN (mg/dL)   Date Value   02/23/2018 21      (goal A1C is < 7)   (goal LDL is <100) need 30-50% reduction from baseline     BP Readings from Last 3 Encounters:   02/23/18 112/70   02/19/18 128/77   02/12/18 134/86    (goal /80)      All Future Testing planned in [facility]:  Lab Frequency Next Occurrence   Hemoglobin A1C Once 09/06/2017   RAUL DIGITAL SCREEN BILATERAL Once 20/95/0547   Basic Metabolic Panel Once 78/26/4292   CBC Auto Differential Once 08/14/2017   Vitamin D 25 Hydroxy Once 11/12/2017   Electrophoresis Protein, Serum without Reflex to Immunofixation Once 08/14/2017   C3 Complement Once 08/14/2017   C4 Complement Once 08/14/2017   ANNABELLA Once 08/14/2017   Anti-Neutrophilic Cytoplasmic Antibody Once 08/14/2017   Rheumatoid Factor Once 08/14/2017 [place]/Lambda Free Lt Chains, Serum Quant Once 08/14/2017   Creatinine, Random Urine Once 08/14/2017   Urinalysis Once 08/14/2017   Protein Electrophoresis, Urine Once 08/14/2017   Protein, urine, random Once 08/14/2017   US Renal Complete Once 08/14/2017   Bladder scan Once 08/14/2017   Urine Culture Once 82/56/6268   Basic Metabolic Panel Once 03/82/3544   CBC Auto Differential Once 03/25/2018   Creatinine, Random Urine Once 03/25/2018   Urinalysis Once 09/25/2017   Protein, urine, random Once 03/25/2018   CBC With Auto Differential Once 02/12/2018   Comprehensive Metabolic Panel Once 17/96/6057   CBC Once 02/23/2018   Protime-INR Once 02/23/2018       Next Visit Date:  Future Appointments  Date Time Provider Rachael Garsia   3/19/2018 9:20 AM KIKO Ni [facility]   3/26/2018 10:10 AM Kenny Ann MD [facility] None            Patient Active Problem List:     Mixed hyperlipidemia     Essential (primary) hypertension     Anxiety     Gastroesophageal reflux 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 cardiac condition ([address]     CD (contact dermatitis)     Coagulopathy (HCC)     DVT of popliteal vein (HCC)     Benign essential HTN     Failure to wean from mechanical ventilation (HCC)     Acid reflux     History of repair of hip joint     Osteoarthritis     Respiratory failure (HCC)     Decreased muscle tone     Scar condition and fibrosis of skin     Lumbar canal stenosis     Type II diabetes mellitus (HCC)     Pneumonia     Sepsis ([address]     Constipation     UTI (urinary tract infection)     BLANE (acute kidney injury) ([address]     Hx of pulmonary embolus     Mild protein-calorie malnutrition ([address]     Pneumonia due to organism

## 2018-02-27 NOTE — ED PROVIDER NOTES
[facility]  eMERGENCY dEPARTMENT eNCOUnter    Pt Name: Iam Jani  MRN: [id number]  [place] 1950  Date of evaluation: 2/27/18  CHIEF COMPLAINT       Chief Complaint   Patient presents with    Anemia     HISTORY OF PRESENT ILLNESS   The pt presents with weakness and fatigue. She was recently diagnosed with anemia and had a transfusion. She states she feels like she did prior to the transfusion. Significant other states pt is more pale than usual.  Pt is on coumadin, prior history of dvt/pe 4 years ago. Pt denies any chest pain or abd pain. No shortness of breath. Pt denies bleeding or dark/tarry stools. The history is provided by the patient. Fatigue   Severity:  Moderate  Onset quality:  Gradual  Timing:  Constant  Progression:  Worsening  Chronicity:  Recurrent  Relieved by:  Nothing  Worsened by:  Nothing  Ineffective treatments:  None tried  Associated symptoms: no abdominal pain, no chest pain, no headaches and no shortness of breath        REVIEW OF SYSTEMS     Review of Systems   Constitutional: Positive for fatigue. HENT: Negative. Negative for congestion. Eyes: Negative. Respiratory: Negative. Negative for shortness of breath. Cardiovascular: Negative. Negative for chest pain. Gastrointestinal: Negative. Negative for abdominal pain and blood in stool. Musculoskeletal: Negative. Negative for back pain. Skin: Negative. Negative for rash. Neurological: Negative. Negative for headaches. All other systems reviewed and are negative.     PAST MEDICAL HISTORY     Past Medical History:   Diagnosis Date    Chronic kidney disease     Diabetes ([address]     Hyperlipidemia     Hypertension     Neuropathy ([address]     Osteoarthritis     Pulmonary embolism ([address]     2014    Type 2 diabetes mellitus without complication ([address]     Vocal cord paralysis      SURGICAL HISTORY       Past Surgical History:   Procedure Laterality Date    BREAST SURGERY      reduction    Abdominal: Soft. There is no tenderness. Musculoskeletal: She exhibits no deformity. Neurological: She is alert and oriented to person, place, and time. She has normal strength. No cranial nerve deficit or sensory deficit. GCS eye subscore is 4. GCS verbal subscore is 5. GCS motor subscore is 6. Skin: Skin is warm and dry. No rash noted. She is not diaphoretic. There is pallor. Nursing note and vitals reviewed. MEDICAL DECISION MAKING:   MDM  Weakness and fatigue, generalized. No focal deficits or cva symptoms. Reviewed results. Anemia, minimal change from last, but pt does have abi. Elevated cr. On reeval, exam unchanged. Discussed with medicine, will admit for further therapy and evaluation. Ready for admission at this time. CRITICAL CARE:     PROCEDURES:    Procedures    DIAGNOSTIC RESULTS   EKG: All EKG's are interpreted by the Emergency Department Physician who either signs or Co-signs this chart in the absence of a cardiologist.      RADIOLOGY:All plain film, CT, MRI, and formal ultrasound images (except ED bedside ultrasound) are read by the radiologist, see reports below, unless otherwise noted in MDM or here. No orders to display     LABS: All lab results were reviewed by myself, and all abnormals are listed below.   Labs Reviewed   APTT - Abnormal; Notable for the following:        Result Value    PTT 42.6 (*)     All other components within normal limits   PROTIME-INR - Abnormal; Notable for the following:     Protime 37.7 (*)     All other components within normal limits   CBC WITH AUTO DIFFERENTIAL - Abnormal; Notable for the following:     WBC 11.2 (*)     Hemoglobin 9.9 (*)     Hematocrit 31.6 (*)     MCV 77.8 (*)     MCH 24.5 (*)     RDW 16.9 (*)     Platelets 994 (*)     Seg Neutrophils 89 (*)     Lymphocytes 4 (*)     Segs Absolute 9.97 (*)     Absolute Lymph # 0.45 (*)     All other components within normal limits   COMPREHENSIVE METABOLIC PANEL - Abnormal; Notable for the

## 2018-02-27 NOTE — TELEPHONE ENCOUNTER
Talked to patient's , she has been admitted to hospital where they are monitoring her PT/INR, INR was 4.0, the hospital is holding her coumadin tonight.  Patient was complaining of cold hands, feet and nose,  took her to ER

## 2018-02-27 NOTE — H&P
Basophils # 0.11 0.0 - 0.2 k/uL    Morphology ANISOCYTOSIS PRESENT    Comprehensive Metabolic Panel    Collection Time: 02/27/18  2:48 PM   Result Value Ref Range    Glucose 163 (H) 70 - 99 mg/dL    BUN 40 (H) 8 - 23 mg/dL    CREATININE 2.40 (H) 0.50 - 0.90 mg/dL    Bun/Cre Ratio NOT REPORTED 9 - 20    Calcium 9.5 8.6 - 10.4 mg/dL    Sodium 135 135 - 144 mmol/L    Potassium 4.9 3.7 - 5.3 mmol/L    Chloride 96 (L) 98 - 107 mmol/L    CO2 19 (L) 20 - 31 mmol/L    Anion Gap 20 (H) 9 - 17 mmol/L    Alkaline Phosphatase 68 35 - 104 U/L    ALT 23 5 - 33 U/L    AST 22 <32 U/L    Total Bilirubin 0.24 (L) 0.3 - 1.2 mg/dL    Total Protein 7.5 6.4 - 8.3 g/dL    Alb 3.7 3.5 - 5.2 g/dL    Albumin/Globulin Ratio NOT REPORTED 1.0 - 2.5    GFR Non-African American 20 (L) >60 mL/min    GFR  24 (L) >60 mL/min    GFR Comment          GFR Staging NOT REPORTED    TYPE AND SCREEN    Collection Time: 02/27/18  2:54 PM   Result Value Ref Range    Expiration Date 03/02/2018     Arm Band Number [id number]     ABO/Rh A POSITIVE     Antibody Screen NEGATIVE        Imaging/Diagnostics:  Xr Chest Standard (2 Vw)    Result Date: 2/17/2018  EXAMINATION: TWO VIEWS OF THE CHEST 2/17/2018 COMPARISON: None HISTORY: ORDERING SYSTEM PROVIDED HISTORY: SOB TECHNOLOGIST PROVIDED HISTORY: Reason for exam:->SOB Ordering Physician Provided Reason for Exam: SOB Acuity: Unknown Type of Exam: Initial FINDINGS: Prominent elevation of the right hemidiaphragm. Left basilar airspace opacity, potentially in the lingula. No findings of pneumothorax or pleural effusion. Normal mediastinal, hilar, and cardiac contours. No acute fracture. 1. Left basilar airspace opacity potentially in the lingula, potentially atelectasis or pneumonia. 2. Prominent elevation the right hemidiaphragm suggestive of right phrenic nerve palsy.      Xr Hip Left (2-3 Views)    Result Date: 2/26/2018  EXAMINATION: 2 VIEWS OF THE RIGHT HIP; 2 VIEWS OF THE LEFT HIP 2/26/2018 9:29 am COMPARISON: None HISTORY: ORDERING SYSTEM PROVIDED HISTORY: Bilateral hip pain TECHNOLOGIST PROVIDED HISTORY: Reason for exam:->pain Ordering Physician Provided Reason for Exam: pain to both hips for several weeks Acuity: Acute Type of Exam: Initial FINDINGS: Bilateral hip prostheses appear intact without evidence of acute fracture or dislocation. Multiple pelvic phleboliths. Alignment appears intact. Mild osteopenia. Sacroiliac joints show minor degenerative changes. Small subcentimeter well corticated ossific structures adjacent to the left greater trochanter are favored to be from remote trauma or heterotopic ossification. Very small amount of heterotopic ossification by the right hip. Bilateral hip prostheses appear intact with no acute osseous abnormality. Xr Hip Right (2-3 Views)    Result Date: 2/26/2018  EXAMINATION: 2 VIEWS OF THE RIGHT HIP; 2 VIEWS OF THE LEFT HIP 2/26/2018 9:29 am COMPARISON: None HISTORY: ORDERING SYSTEM PROVIDED HISTORY: Bilateral hip pain TECHNOLOGIST PROVIDED HISTORY: Reason for exam:->pain Ordering Physician Provided Reason for Exam: pain to both hips for several weeks Acuity: Acute Type of Exam: Initial FINDINGS: Bilateral hip prostheses appear intact without evidence of acute fracture or dislocation. Multiple pelvic phleboliths. Alignment appears intact. Mild osteopenia. Sacroiliac joints show minor degenerative changes. Small subcentimeter well corticated ossific structures adjacent to the left greater trochanter are favored to be from remote trauma or heterotopic ossification. Very small amount of heterotopic ossification by the right hip. Bilateral hip prostheses appear intact with no acute osseous abnormality.          Assessment :      Primary Problem  BLANE (acute kidney injury) [facility])    Active Hospital Problems    Diagnosis Date Noted    Supratherapeutic INR [R79.1] 02/27/2018    BLANE (acute kidney injury) ([facility] [N17.9] 02/17/2018    Type II

## 2018-02-28 ENCOUNTER — APPOINTMENT (OUTPATIENT)
Dept: GENERAL RADIOLOGY | Age: 68
DRG: 683 | End: 2018-02-28
Payer: MEDICARE

## 2018-02-28 ENCOUNTER — TELEPHONE (OUTPATIENT)
Dept: PRIMARY CARE CLINIC | Age: 68
End: 2018-02-28

## 2018-02-28 PROBLEM — E44.0 MODERATE MALNUTRITION (HCC): Status: ACTIVE | Noted: 2018-02-19

## 2018-02-28 LAB
ABSOLUTE EOS #: 0.06 K/UL (ref 0–0.4)
ABSOLUTE IMMATURE GRANULOCYTE: ABNORMAL K/UL (ref 0–0.3)
ABSOLUTE LYMPH #: 0.97 K/UL (ref 1–4.8)
ABSOLUTE MONO #: 0.63 K/UL (ref 0.1–1.3)
ANION GAP SERPL CALCULATED.3IONS-SCNC: 15 MMOL/L (ref 9–17)
BASOPHILS # BLD: 1 % (ref 0–2)
BASOPHILS ABSOLUTE: 0.06 K/UL (ref 0–0.2)
BUN BLDV-MCNC: 31 MG/DL (ref 8–23)
BUN/CREAT BLD: ABNORMAL (ref 9–20)
CALCIUM SERPL-MCNC: 8.7 MG/DL (ref 8.6–10.4)
CHLORIDE BLD-SCNC: 102 MMOL/L (ref 98–107)
CO2: 19 MMOL/L (ref 20–31)
CREAT SERPL-MCNC: 1.42 MG/DL (ref 0.5–0.9)
DIFFERENTIAL TYPE: ABNORMAL
DIRECT EXAM: NORMAL
EOSINOPHILS RELATIVE PERCENT: 1 % (ref 0–4)
GFR AFRICAN AMERICAN: 45 ML/MIN
GFR NON-AFRICAN AMERICAN: 37 ML/MIN
GFR SERPL CREATININE-BSD FRML MDRD: ABNORMAL ML/MIN/{1.73_M2}
GFR SERPL CREATININE-BSD FRML MDRD: ABNORMAL ML/MIN/{1.73_M2}
GLUCOSE BLD-MCNC: 107 MG/DL (ref 65–105)
GLUCOSE BLD-MCNC: 116 MG/DL (ref 70–99)
GLUCOSE BLD-MCNC: 132 MG/DL (ref 65–105)
GLUCOSE BLD-MCNC: 152 MG/DL (ref 65–105)
GLUCOSE BLD-MCNC: 168 MG/DL (ref 65–105)
GLUCOSE BLD-MCNC: 181 MG/DL (ref 65–105)
HCT VFR BLD CALC: 26.5 % (ref 36–46)
HCT VFR BLD CALC: 27.3 % (ref 36–46)
HCT VFR BLD CALC: 27.3 % (ref 36–46)
HEMOGLOBIN: 8.3 G/DL (ref 12–16)
HEMOGLOBIN: 8.5 G/DL (ref 12–16)
HEMOGLOBIN: 8.6 G/DL (ref 12–16)
IMMATURE GRANULOCYTES: ABNORMAL %
INR BLD: 4.7
LYMPHOCYTES # BLD: 17 % (ref 24–44)
Lab: NORMAL
Lab: NORMAL
MCH RBC QN AUTO: 24.3 PG (ref 26–34)
MCHC RBC AUTO-ENTMCNC: 31 G/DL (ref 31–37)
MCV RBC AUTO: 78.4 FL (ref 80–100)
MONOCYTES # BLD: 11 % (ref 1–7)
MORPHOLOGY: ABNORMAL
MYCOPLASMA PNEUMONIAE IGM: 0.48
NRBC AUTOMATED: ABNORMAL PER 100 WBC
PARTIAL THROMBOPLASTIN TIME: 46.4 SEC (ref 23–31)
PDW BLD-RTO: 17.1 % (ref 11.5–14.9)
PLATELET # BLD: 486 K/UL (ref 150–450)
PLATELET ESTIMATE: ABNORMAL
PMV BLD AUTO: 8.1 FL (ref 6–12)
POTASSIUM SERPL-SCNC: 4.4 MMOL/L (ref 3.7–5.3)
PROTHROMBIN TIME: 44.8 SEC (ref 9.7–12)
RBC # BLD: 3.48 M/UL (ref 4–5.2)
RBC # BLD: ABNORMAL 10*6/UL
SEG NEUTROPHILS: 70 % (ref 36–66)
SEGMENTED NEUTROPHILS ABSOLUTE COUNT: 3.98 K/UL (ref 1.3–9.1)
SODIUM BLD-SCNC: 136 MMOL/L (ref 135–144)
SPECIMEN DESCRIPTION: NORMAL
STATUS: NORMAL
STATUS: NORMAL
WBC # BLD: 5.7 K/UL (ref 3.5–11)
WBC # BLD: ABNORMAL 10*3/UL

## 2018-02-28 PROCEDURE — G8978 MOBILITY CURRENT STATUS: HCPCS

## 2018-02-28 PROCEDURE — 85014 HEMATOCRIT: CPT

## 2018-02-28 PROCEDURE — 82947 ASSAY GLUCOSE BLOOD QUANT: CPT

## 2018-02-28 PROCEDURE — G8997 SWALLOW GOAL STATUS: HCPCS

## 2018-02-28 PROCEDURE — 85730 THROMBOPLASTIN TIME PARTIAL: CPT

## 2018-02-28 PROCEDURE — G8996 SWALLOW CURRENT STATUS: HCPCS

## 2018-02-28 PROCEDURE — 2580000003 HC RX 258: Performed by: STUDENT IN AN ORGANIZED HEALTH CARE EDUCATION/TRAINING PROGRAM

## 2018-02-28 PROCEDURE — 99232 SBSQ HOSP IP/OBS MODERATE 35: CPT | Performed by: INTERNAL MEDICINE

## 2018-02-28 PROCEDURE — 36415 COLL VENOUS BLD VENIPUNCTURE: CPT

## 2018-02-28 PROCEDURE — 97162 PT EVAL MOD COMPLEX 30 MIN: CPT

## 2018-02-28 PROCEDURE — 2060000000 HC ICU INTERMEDIATE R&B

## 2018-02-28 PROCEDURE — 85025 COMPLETE CBC W/AUTO DIFF WBC: CPT

## 2018-02-28 PROCEDURE — 97116 GAIT TRAINING THERAPY: CPT

## 2018-02-28 PROCEDURE — 83036 HEMOGLOBIN GLYCOSYLATED A1C: CPT

## 2018-02-28 PROCEDURE — 92610 EVALUATE SWALLOWING FUNCTION: CPT

## 2018-02-28 PROCEDURE — 6360000002 HC RX W HCPCS: Performed by: STUDENT IN AN ORGANIZED HEALTH CARE EDUCATION/TRAINING PROGRAM

## 2018-02-28 PROCEDURE — 97535 SELF CARE MNGMENT TRAINING: CPT

## 2018-02-28 PROCEDURE — 80048 BASIC METABOLIC PNL TOTAL CA: CPT

## 2018-02-28 PROCEDURE — G8979 MOBILITY GOAL STATUS: HCPCS

## 2018-02-28 PROCEDURE — 6370000000 HC RX 637 (ALT 250 FOR IP): Performed by: STUDENT IN AN ORGANIZED HEALTH CARE EDUCATION/TRAINING PROGRAM

## 2018-02-28 PROCEDURE — 92611 MOTION FLUOROSCOPY/SWALLOW: CPT

## 2018-02-28 PROCEDURE — 97165 OT EVAL LOW COMPLEX 30 MIN: CPT

## 2018-02-28 PROCEDURE — 85610 PROTHROMBIN TIME: CPT

## 2018-02-28 PROCEDURE — G8998 SWALLOW D/C STATUS: HCPCS

## 2018-02-28 PROCEDURE — 85018 HEMOGLOBIN: CPT

## 2018-02-28 PROCEDURE — 74230 X-RAY XM SWLNG FUNCJ C+: CPT

## 2018-02-28 RX ORDER — LINEZOLID 2 MG/ML
600 INJECTION, SOLUTION INTRAVENOUS EVERY 12 HOURS
Status: DISCONTINUED | OUTPATIENT
Start: 2018-02-28 | End: 2018-03-02 | Stop reason: HOSPADM

## 2018-02-28 RX ADMIN — SODIUM CHLORIDE: 9 INJECTION, SOLUTION INTRAVENOUS at 21:21

## 2018-02-28 RX ADMIN — ACETAMINOPHEN 650 MG: 325 TABLET, FILM COATED ORAL at 10:26

## 2018-02-28 RX ADMIN — SODIUM CHLORIDE: 9 INJECTION, SOLUTION INTRAVENOUS at 04:31

## 2018-02-28 RX ADMIN — FERROUS SULFATE TAB 325 MG (65 MG ELEMENTAL FE) 325 MG: 325 (65 FE) TAB at 08:45

## 2018-02-28 RX ADMIN — LINEZOLID 600 MG: 600 INJECTION, SOLUTION INTRAVENOUS at 10:27

## 2018-02-28 RX ADMIN — ROPINIROLE 0.5 MG: 0.5 TABLET, FILM COATED ORAL at 08:45

## 2018-02-28 RX ADMIN — LINEZOLID 600 MG: 600 INJECTION, SOLUTION INTRAVENOUS at 21:24

## 2018-02-28 RX ADMIN — ROPINIROLE 0.5 MG: 0.5 TABLET, FILM COATED ORAL at 14:17

## 2018-02-28 RX ADMIN — ACETAMINOPHEN 650 MG: 325 TABLET, FILM COATED ORAL at 21:24

## 2018-02-28 RX ADMIN — IRON SUCROSE 200 MG: 20 INJECTION, SOLUTION INTRAVENOUS at 14:48

## 2018-02-28 RX ADMIN — INSULIN LISPRO 1 UNITS: 100 INJECTION, SOLUTION INTRAVENOUS; SUBCUTANEOUS at 12:13

## 2018-02-28 RX ADMIN — LORAZEPAM 0.5 MG: 0.5 TABLET ORAL at 10:26

## 2018-02-28 RX ADMIN — FAMOTIDINE 20 MG: 20 TABLET, FILM COATED ORAL at 08:45

## 2018-02-28 RX ADMIN — ROPINIROLE 0.5 MG: 0.5 TABLET, FILM COATED ORAL at 21:24

## 2018-02-28 RX ADMIN — ATORVASTATIN CALCIUM 20 MG: 20 TABLET, FILM COATED ORAL at 21:22

## 2018-02-28 ASSESSMENT — PAIN DESCRIPTION - DESCRIPTORS: DESCRIPTORS: SORE

## 2018-02-28 ASSESSMENT — PAIN SCALES - GENERAL
PAINLEVEL_OUTOF10: 6
PAINLEVEL_OUTOF10: 0
PAINLEVEL_OUTOF10: 3
PAINLEVEL_OUTOF10: 0
PAINLEVEL_OUTOF10: 3
PAINLEVEL_OUTOF10: 5
PAINLEVEL_OUTOF10: 3

## 2018-02-28 ASSESSMENT — ENCOUNTER SYMPTOMS
SHORTNESS OF BREATH: 0
BLOOD IN STOOL: 0
CONSTIPATION: 0
VOMITING: 0
NAUSEA: 0
WHEEZING: 0
ABDOMINAL PAIN: 1
DIARRHEA: 0

## 2018-02-28 ASSESSMENT — PAIN DESCRIPTION - LOCATION
LOCATION: KNEE
LOCATION: KNEE
LOCATION: BACK

## 2018-02-28 ASSESSMENT — PAIN DESCRIPTION - ONSET: ONSET: ON-GOING

## 2018-02-28 ASSESSMENT — PAIN DESCRIPTION - ORIENTATION
ORIENTATION: LEFT
ORIENTATION: UPPER;MID
ORIENTATION: LEFT

## 2018-02-28 ASSESSMENT — PAIN DESCRIPTION - PAIN TYPE
TYPE: CHRONIC PAIN

## 2018-02-28 ASSESSMENT — PAIN DESCRIPTION - FREQUENCY
FREQUENCY: CONTINUOUS
FREQUENCY: INTERMITTENT
FREQUENCY: INTERMITTENT

## 2018-02-28 NOTE — CARE COORDINATION
[address] Transitions Interview     2018    Patient: Erica Pathak Patient : 1950   MRN: [id number]  Reason for Admission: There are no discharge diagnoses documented for the most recent discharge. 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    Cardiac arrest due to underlying cardiac condition ([address]    CD (contact dermatitis)    Coagulopathy (HCC)    DVT of popliteal vein (HCC)    Benign essential HTN    Failure to wean from mechanical ventilation (HCC)    Acid reflux    History of repair of hip joint    Osteoarthritis    Respiratory failure (HCC)    Decreased muscle tone    Scar condition and fibrosis of skin    Lumbar canal stenosis    Type II diabetes mellitus (HCC)    Pneumonia    Sepsis (HCC)    Constipation    UTI (urinary tract infection)    BLANE (acute kidney injury) ([address]    Hx of pulmonary embolus    Moderate malnutrition ([address]    Pneumonia due to organism    Supratherapeutic INR       Inpatient Assessment  Care Transitions Summary    Care Transitions Inpatient Review  Medication Review  Do you have all of your prescriptions and are they filled?:  Yes   Are you able to afford your medications?:  Yes  Housing Review  Who do you live with?:  Partner/Spouse/SO  Social Support  Durable Medical Equipment  Patient DME:  Straight cane, Other, Scooter  Other Patient DME:  glucometer  Functional Review  Ability to seek help/take action for Emergent/Urgent situations i.e. fire, crime, inclement weather or health crisis. :  Independent  Ability handle personal hygiene needs (bathing/dressing/grooming): Independent  Ability to manage medications: Independent  Ability to prepare food:  Independent  Ability to maintain home (clean home, laundry): Independent  Ability to drive and/or has transportation:  Independent  Ability to do shopping:  Independent  Ability to manage finances:   Independent  Is patient able to live independently?:  Yes  Hearing and Vision  Visual Impairment:  Visual impairment (Glasses/contacts)  Care Transitions Interventions         Follow Up  Future Appointments  Date Time Provider Rachael Garsia   3/19/2018 9:20 AM Brittaney Drake CNP [facility]   3/26/2018 10:10 AM Karine Brasher MD [facility] None       Health Maintenance  Health Maintenance Due   Topic Date Due    Breast cancer screen  09/28/1999    DEXA (modify frequency per FRAX score)  09/28/2014       Shad Alfred RN

## 2018-02-28 NOTE — PROCEDURES
INSTRUMENTAL SWALLOW REPORT  MODIFIED BARIUM SWALLOW    NAME: Renee Byrnes   : 1950  MRN: [id number]       Date of Eval: 2018              Referring Diagnosis(es):  Dysphagia    Past Medical History:  has a past medical history of Chronic kidney disease; Diabetes ([facility] Hyperlipidemia; Hypertension; Neuropathy ([facility] Osteoarthritis; Pulmonary embolism ([facility] Type 2 diabetes mellitus without complication ([facility] and Vocal cord paralysis. Past Surgical History:  has a past surgical history that includes Foot surgery; Colonoscopy; Breast surgery; joint replacement (Bilateral); and other surgical history. Current Diet Solid Consistency: Dysphagia I Pureed  Current Diet Liquid Consistency: Nectar       Type of Study: Initial MBS       Recent CXR/CT of Chest: Date CXR- -   Decreased left basilar opacities, favored to reflect atelectasis.  Difficult   to exclude airspace disease at the right lung base on single frontal view   exam.       Suspect hiatal hernia.       Unchanged elevated right hemidiaphragm.  Fluoroscopic sniff test could help   evaluate for phrenic nerve dysfunction/ paralysis if indicated clinically. Patient Complaints/Reason for Referral:  Renee Byrnes was referred for a MBS to assess the efficiency of his/her swallow function, assess for aspiration, and to make recommendations regarding safe dietary consistencies, effective compensatory strategies, and safe eating environment. Onset of problem:    Pt. Demonstrated overt s/s aspiration on bedside swallow evaluation this am, was put on pureed solids, nectar thick liquids. Pt. Has h/o paralyzed vocal cord from ~4 years ago.              Behavior/Cognition/Vision/Hearing:  Behavior/Cognition: Alert, Cooperative  Vision: Within Functional Limits  Hearing: Within functional limits    Impressions:     Patient Position: Lateral     Consistencies Administered: Soft solid, Reg solid, Thin cup, Thin straw, Pudding teaspoon, Nectar cup                             Dysphagia Outcome Severity Scale: Level 6: Within functional limits/Modified independence  Penetration-Aspiration Scale (PAS): 1 - Material does not enter the airway    Recommended Diet:  Solid consistency: Dental Soft  Liquid consistency: Thin            Safe Swallow Protocol:     Compensatory Swallowing Strategies: Small bites/sips, Upright as possible for all oral intake, Alternate solids and liquids, Eat/Feed slowly              Recommendations/Treatment  Requires SLP Intervention: No                    Education:  recommendations were reviewed with pt.  following this exam.   Patient Education: yes, pt and   Patient Education Response: Verbalizes understanding                    Oral Preparation / Oral Phase  Oral Phase: WNL (premature vallecular spillage of all consistencies)   Pt. Demonstrated decreased mastication of dry solids, needed liquid wash. This assisted to pass bolus through oral phase. Pharyngeal Phase  Pharyngeal Phase: WNL      Esophageal Phase  Esophageal Screen: WNL        Pain   Patient Currently in Pain: No      G-Code:  SLP G-Codes  Functional Limitations: Swallowing  Swallow Current Status (): At least 1 percent but less than 20 percent impaired, limited or restricted  Swallow Goal Status (): At least 1 percent but less than 20 percent impaired, limited or restricted  Swallow Discharge Status (): At least 1 percent but less than 20 percent impaired, limited or restricted      Therapy Time:   Individual Concurrent Group Co-treatment   Time In 1330         Time Out 0930         Minutes [address] A.CCC/SLP     2/28/2018, 2:02 PM

## 2018-02-28 NOTE — PROGRESS NOTES
[address]   Occupational Therapy Evaluation  Date: 18  Patient Name: Selvin Christine       Room:   MRN: [id number]  Account: [de-identified]   : 1950  (76 y.o.) Gender: female     Referring Practitioner: Dr. Kevin Bolanos  Diagnosis: BLANE  Additional Pertinent Hx: Hx of vocal cord paralysis    Treatment Diagnosis: Impaired self-care status  Past Medical History:  has a past medical history of Chronic kidney disease; Diabetes ([facility] Hyperlipidemia; Hypertension; Neuropathy ([facility] [facility] Osteoarthritis; Pulmonary embolism ([facility] Type 2 diabetes mellitus without complication ([facility] [address] and Vocal cord paralysis. Past Surgical History:   has a past surgical history that includes Foot surgery; Colonoscopy; Breast surgery; joint replacement (Bilateral); and other surgical history. Restrictions  Restrictions/Precautions: Fall Risk  Implants present? : Metal implants (bilateral THRs)  Required Braces or Orthoses?: No     Vitals  Temp: 98.4 °F (36.9 °C)  Pulse: 78  Resp: 15  BP: (!) 120/52  Height: 5' 4\" (162.6 cm)  Weight: 170 lb (77.1 kg)  BMI (Calculated): 29.2  Oxygen Therapy  SpO2: 100 %  Pulse Oximeter Device Mode: Continuous  O2 Device: None (Room air)  Level of Consciousness: Alert    Subjective  Subjective: \"I got injections in my shoulders yesterday with the arthritis doctor, that's why I can get around now cause the pain is gone\"  Comments: Reports no shoulder pain but stiffness in her LE.   Overall Orientation Status: Within Functional Limits  Vision  Vision: Impaired  Vision Exceptions: Wears glasses at all times  Hearing  Hearing: Within functional limits  Social/Functional History  Lives With: Spouse  Type of Home: Mobile home  Home Layout: One level  Home Access: Stairs to enter without rails (4 platform steps w/o rail  on side or 4 steps w/ left rail and house on right in front)  Entrance Stairs - Number of Steps: 4  Bathroom Shower/Tub: Walk-in shower, Shower chair with back (has garden tub but difficulty getting in & out)  Bathroom Toilet: Standard  Bathroom Equipment: Shower chair, Grab bars in shower, Toilet raiser, Grab bars around toilet  Bathroom Accessibility: Sisto Caceres accessible (But difficult to maneuver walker in BR)  Home Equipment: Cane, Electric scooter, Rolling walker  ADL Assistance: Needs assistance (spouse assisted getting in and out for safety)  Homemaking Assistance: Needs assistance (spouse is primary, spouse preps for microwave meals when he is away at work)  Homemaking Responsibilities: No (spouse is primary)  Ambulation Assistance: Needs assistance (using w walker, increased struggle, prefers to furniture walk)  Transfer Assistance: Needs assistance (spouse assisting in & out of bed)  Active : No  Mode of Transportation: Family, Car, Truck  Occupation: Retired  IADL Comments: Pt was recently discharged from hospital and was home for ~1 week. Prior to original hospital stay pt was IND with BADL's, mobility, and most IADL's. Has been needing increased A lately 2* weakness. Additional Comments: spouse works Wed, [address] and Fri for an [employer]- keeps in touch by phone    Objective  Vision - Basic Assessment  Patient Visual Report: No visual complaint reported. Cognition  Overall Cognitive Status: WFL   Perception  Overall Perceptual Status: WFL  Sensation  Overall Sensation Status: WFL   ADL  Feeding: NPO  Grooming: Stand by assistance, Increased time to complete (Diffculty combing hair 2* ltd shoulder ROM and IV placement)  UE Bathing: Stand by assistance, Increased time to complete  LE Bathing: Moderate assistance, Maximum assistance  UE Dressing: Stand by assistance, Increased time to complete  LE Dressing: Maximum assistance (Pt reports needing A 2* height of bed and IV placement)  Additional Comments: Pt likely more independent with LB ADL's when seated on a lower surface similar to her home set-up.   May benefit from AE but reports not needing it at home

## 2018-02-28 NOTE — PROGRESS NOTES
ADMISSION NOTE       Patient admitted to room  2106. Time of admit:  [address] from:  ER    Reason for admission:  Acute kidney Injury    Where patient has been residing for the last 24 hrs:  Private residence     Has the patient been admitted to any facility in the last 4 weeks, which one:  no    Family at bedside:  no    Patient is currently resting in bed, vitals obtained, telemetry placed on pt. No distress noted. Patient has been oriented to room, educated on how to use call light, and to call for assistance prior to getting up. Bed in lowest and locked position. 2 siderails up for safety. Call light within reach.

## 2018-02-28 NOTE — TELEPHONE ENCOUNTER
----- Message from Carmen Khan CNP sent at 2/28/2018  8:43 AM EST -----  Culture is positive, patient is admitted at [facility]. Please notify  to verify it is addressed. The bacteria is RESISTANT to levaquin, which is what they were giving her last I read.  thanks

## 2018-02-28 NOTE — PLAN OF CARE
Problem: Tissue Perfusion - Renal, Altered:  Goal: Electrolytes within specified parameters  Electrolytes within specified parameters   Outcome: Ongoing  Labs reviewed. Urine specimens sent. IV fluids continue. I and O on chart.

## 2018-02-28 NOTE — PROGRESS NOTES
lb (83.5 kg)  · % Weight Change: 7%,  3 months  · Ideal Body Wt: 120 lb (54.4 kg), % Ideal Body 142%  · BMI Classification: BMI 25.0 - 29.9 Overweight  · Comparative Standards (Estimated Nutrition Needs):  · Estimated Daily Total Kcal: 5191-6654 kcal/d  · Estimated Daily Protein (g): 62g/d    Estimated Intake vs Estimated Needs: Intake Less Than Needs    Nutrition Risk Level: High    Nutrition Interventions:   Continue current diet, Start ONS  Continued Inpatient Monitoring, Education Not Indicated    Nutrition Evaluation:   · Evaluation: Goals set   · Goals: PO intakes to meet >75% estimated needs. · Monitoring: Diet Progression, Meal Intake, Supplement Intake, Diet Tolerance, Skin Integrity, Chewing/Swallowing, Pertinent Labs, Weight    See Adult Nutrition Doc Flowsheet for more detail. Liv Goldberg R.D., L.D.   Clinical Dietitian    [phone]

## 2018-02-28 NOTE — PLAN OF CARE
Problem: Nutrition  Goal: Optimal nutrition therapy  Outcome: Ongoing  Nutrition Problem: Unintended weight loss  Intervention: Food and/or Nutrient Delivery: Continue current diet, Start ONS  Nutritional Goals: PO intakes to meet >75% estimated needs.

## 2018-02-28 NOTE — CARE COORDINATION
CASE MANAGEMENT NOTE:    Admission Date:  2/27/2018 Sera Mccann is a 75 y.o.  female    Admitted for : BLANE (acute kidney injury) ([facility] [address] [N17.9]    Met with:  Patient    PCP:  Muna Gonzalez CNP                                 Insurance:  Medicare      Current Residence/ Living Arrangements:  independently at home             Current Services PTA:  No    Is patient agreeable to VNS: Yes    Freedom of choice provided: Yes         VNS chosen:  Yes    DME:  straight cane, walker, wheelchair and scooter    Home Oxygen: Yes    Nebulizer: No    Supplier: not sure    Potential Assistance Needed: No    SNF needed: No    Pharmacy:  [facility]       Does Patient want to use MEDS to BEDS? No    Family Members/Caregivers that pt would like involved in their care:    Yes    If yes, list name here:  Silvana Huffman:  Family                      Discharge Plan:  2/28/18 Medicare PT is from home with her son in a two story home she uses a walker, cane, wheelchair and a scooter pt is agreeable to vns [address] is to discharge to home with no needs MARAINO is started and needs signed will follow .//tv              Readmission Risk              Readmission Risk:        29       Age 72 or Greater:  1    Admitted from SNF or Requires Paid or Family Care:  2    Currently has CHF,COPD,ARF,CRI,or is on dialysis:  4    Takes more than 5 Prescription Medications:  4    Takes Digoxin,Insulin,Anticoagulants,Narcotics or ASA/Plavix:  [address] in Past 12 Months:  10    On Disability:  0    Patient Considers own Health:  5          Electronically signed by:  Irene Perdomo RN on 2/28/2018 at 9:59 AM

## 2018-02-28 NOTE — PROGRESS NOTES
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; Cardiac arrest due to underlying cardiac condition ([address] CD (contact dermatitis); Coagulopathy ([address] DVT of popliteal vein ([address] Benign essential HTN; Failure to wean from mechanical ventilation ([address] Acid reflux; History of repair of hip joint; Osteoarthritis; Respiratory failure ([address] Decreased muscle tone; Scar condition and fibrosis of skin; Lumbar canal stenosis; Type II diabetes mellitus ([address] Pneumonia; Sepsis ([address] Constipation; UTI (urinary tract infection); BLANE (acute kidney injury) ([address] Hx of pulmonary embolus; Mild protein-calorie malnutrition ([address] Pneumonia due to organism; and Supratherapeutic INR on her problem list.  ONSET DATE: 28    Recent Chest Xray/CT of Chest:   Per Chart-Chest Xray: 18:    Unchanged elevation the right hemidiaphragm. Fluctuating left basilar opacity. Additional retrocardiac opacity suggesting hiatal hernia. No pneumothorax or large effusion. Stable cardiomediastinal contours with obscuration of the right are poorly by elevated hemidiaphragm. No acute osseous abnormality. Decreased left basilar opacities, favored to reflect atelectasis. Difficult to exclude airspace disease at the right lung base on single frontal view exam. Suspect hiatal hernia. Unchanged elevated right hemidiaphragm. Fluoroscopic sniff test could help evaluate for phrenic nerve dysfunction/ paralysis if indicated clinically.      Date of Eval: 2018  Evaluating Therapist: Yanira Patterson,  Clinician    Current Diet feelings of food/liquid getting stuck after eating/drinking. Behavior/Cognition: Alert; Cooperative  Respiratory Status: Room air  Follows Directions: Complex  Patient Positioning: Upright in chair  Baseline Vocal Quality: Weak;Dysphonic (History of unilateral vf paralysis)  Volitional Cough: Weak  Prior Dysphagia History: None  Consistencies Administered: Puree; Honey - cup;Nectar - cup; Thin - cup           Vision/Hearing  Vision  Vision: Within Functional Limits  Hearing  Hearing: Within functional limits    Oral Motor Deficits  Oral/Motor  Oral Motor: Within functional limits    Oral Phase Dysfunction  Oral Phase  Oral Phase: WFL     Indicators of Pharyngeal Phase Dysfunction   Pharyngeal Phase  Pharyngeal Phase: Exceptions  Indicators of Pharyngeal Phase Dysfunction  Decreased Laryngeal Elevation:  (decreased laryngeal elevation at baseline, upon palpation)  Throat Clearing - Immediate: Thin - cup  Throat Clearing - Delayed: Thin - cup  Prognosis  Prognosis  Prognosis for safe diet advancement: fair  Individuals consulted  Consulted and agree with results and recommendations: Patient; Family member;RN  Family member consulted:     Education  Patient Education: yes, pt and   Patient Education Response: Verbalizes understanding      G-Code  SLP G-Codes  Swallow Current Status (): At least 40 percent but less than 60 percent impaired, limited or restricted   Swallow Goal Status ():  At least 20 percent but less than 40 percent impaired, limited or restricted         Therapy Time  SLP Individual Minutes  Time In: 0717  Time Out: 0930  Minutes: Dia Cedilol,  Clinician    2/28/2018 11:24 AM

## 2018-02-28 NOTE — PROGRESS NOTES
Pharmacy Consult      Rita Dowd is a 75 y.o. female for whom pharmacy has been consulted to dose Levofloxacin. 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    Hx of pulmonary embolus    Mild protein-calorie malnutrition (HCC)    Pneumonia due to organism    Supratherapeutic INR       Allergies:  Patient has no known allergies. Recent Labs      02/27/18   1448   CREATININE  2.40*       Ht/Wt:   Ht Readings from Last 1 Encounters:   02/27/18 5' 4\" (1.626 m)        Wt Readings from Last 1 Encounters:   02/27/18 170 lb (77.1 kg)         Estimated Creatinine Clearance: 23 mL/min (A) (based on SCr of 2.4 mg/dL (H)). Assessment:  Will dose standard dosage adjustments for renal clearance of 23 ml/min. Plan:  Levofloxacin 500 mg x 1 then 250 mg every 24 hours. Thank you for the consult. Will continue to follow. Geni Holland. Ph.  2/27/2018  8:36 PM

## 2018-02-28 NOTE — PROGRESS NOTES
in stool, constipation, diarrhea (loose, but took stool softener), nausea and vomiting. Genitourinary: Positive for dysuria. Negative for frequency and urgency. Musculoskeletal: Positive for joint pain (chronic arthritis). Skin: Negative for rash. Neurological: Positive for weakness. Negative for dizziness, speech change and focal weakness. Psychiatric/Behavioral: Positive for depression. The patient is nervous/anxious. Medications: Allergies:  No Known Allergies    Current Meds:   Scheduled Meds:    linezolid  600 mg Intravenous Q12H    atorvastatin  20 mg Oral Nightly    famotidine  20 mg Oral Daily    ferrous sulfate  325 mg Oral Daily with breakfast    rOPINIRole  0.5 mg Oral TID    sodium chloride flush  10 mL Intravenous 2 times per day    insulin lispro  0-6 Units Subcutaneous TID WC    insulin lispro  0-3 Units Subcutaneous Nightly    warfarin (COUMADIN) daily dosing (placeholder)   Other RX Placeholder     Continuous Infusions:    sodium chloride 100 mL/hr at 02/28/18 0431    dextrose       PRN Meds: diclofenac sodium, LORazepam, sodium chloride flush, acetaminophen, magnesium hydroxide, ondansetron, glucose, dextrose, glucagon (rDNA), dextrose    Data:     Past Medical History:   has a past medical history of Chronic kidney disease; Diabetes ([address] Hyperlipidemia; Hypertension; Neuropathy ([address] Osteoarthritis; Pulmonary embolism ([address] Type 2 diabetes mellitus without complication ([address] and Vocal cord paralysis. Social History:   reports that she has never smoked. She has never used smokeless tobacco. She reports that she does not drink alcohol or use drugs.      Family History:   Family History   Problem Relation Age of Onset    Kidney Disease Mother     Cancer Father     Arthritis Brother        Vitals:  BP (!) 120/52   Pulse 78   Temp 98.4 °F (36.9 °C) (Axillary)   Resp 15   Ht 5' 4\" (1.626 m)   Wt 170 lb (77.1 kg)   SpO2 100%   BMI 29.18 kg/m²   Temp (24hrs),

## 2018-03-01 DIAGNOSIS — E11.9 TYPE 2 DIABETES MELLITUS WITHOUT COMPLICATION, WITHOUT LONG-TERM CURRENT USE OF INSULIN (HCC): ICD-10-CM

## 2018-03-01 LAB
ABSOLUTE EOS #: 0.1 K/UL (ref 0–0.4)
ABSOLUTE IMMATURE GRANULOCYTE: ABNORMAL K/UL (ref 0–0.3)
ABSOLUTE LYMPH #: 1.1 K/UL (ref 1–4.8)
ABSOLUTE MONO #: 0.7 K/UL (ref 0.1–1.3)
ANION GAP SERPL CALCULATED.3IONS-SCNC: 14 MMOL/L (ref 9–17)
BASOPHILS # BLD: 1 % (ref 0–2)
BASOPHILS ABSOLUTE: 0.1 K/UL (ref 0–0.2)
BUN BLDV-MCNC: 18 MG/DL (ref 8–23)
BUN/CREAT BLD: ABNORMAL (ref 9–20)
CALCIUM SERPL-MCNC: 8.7 MG/DL (ref 8.6–10.4)
CHLORIDE BLD-SCNC: 104 MMOL/L (ref 98–107)
CO2: 21 MMOL/L (ref 20–31)
CREAT SERPL-MCNC: 0.95 MG/DL (ref 0.5–0.9)
DIFFERENTIAL TYPE: ABNORMAL
EOSINOPHILS RELATIVE PERCENT: 1 % (ref 0–4)
ESTIMATED AVERAGE GLUCOSE: 123 MG/DL
GFR AFRICAN AMERICAN: >60 ML/MIN
GFR NON-AFRICAN AMERICAN: 59 ML/MIN
GFR SERPL CREATININE-BSD FRML MDRD: ABNORMAL ML/MIN/{1.73_M2}
GFR SERPL CREATININE-BSD FRML MDRD: ABNORMAL ML/MIN/{1.73_M2}
GLUCOSE BLD-MCNC: 125 MG/DL (ref 65–105)
GLUCOSE BLD-MCNC: 133 MG/DL (ref 70–99)
GLUCOSE BLD-MCNC: 161 MG/DL (ref 65–105)
GLUCOSE BLD-MCNC: 171 MG/DL (ref 65–105)
HBA1C MFR BLD: 5.9 % (ref 4–6)
HCT VFR BLD CALC: 26.6 % (ref 36–46)
HEMOGLOBIN: 8.5 G/DL (ref 12–16)
IMMATURE GRANULOCYTES: ABNORMAL %
INR BLD: 3.8
LYMPHOCYTES # BLD: 16 % (ref 24–44)
MCH RBC QN AUTO: 24.8 PG (ref 26–34)
MCHC RBC AUTO-ENTMCNC: 32 G/DL (ref 31–37)
MCV RBC AUTO: 77.5 FL (ref 80–100)
MONOCYTES # BLD: 11 % (ref 1–7)
NRBC AUTOMATED: ABNORMAL PER 100 WBC
PARTIAL THROMBOPLASTIN TIME: 46.9 SEC (ref 23–31)
PDW BLD-RTO: 17 % (ref 11.5–14.9)
PLATELET # BLD: 471 K/UL (ref 150–450)
PLATELET ESTIMATE: ABNORMAL
PMV BLD AUTO: 8 FL (ref 6–12)
POTASSIUM SERPL-SCNC: 4.1 MMOL/L (ref 3.7–5.3)
PROTHROMBIN TIME: 36.6 SEC (ref 9.7–12)
RBC # BLD: 3.43 M/UL (ref 4–5.2)
RBC # BLD: ABNORMAL 10*6/UL
SEG NEUTROPHILS: 71 % (ref 36–66)
SEGMENTED NEUTROPHILS ABSOLUTE COUNT: 4.8 K/UL (ref 1.3–9.1)
SODIUM BLD-SCNC: 139 MMOL/L (ref 135–144)
WBC # BLD: 6.7 K/UL (ref 3.5–11)
WBC # BLD: ABNORMAL 10*3/UL

## 2018-03-01 PROCEDURE — 1200000000 HC SEMI PRIVATE

## 2018-03-01 PROCEDURE — 97110 THERAPEUTIC EXERCISES: CPT

## 2018-03-01 PROCEDURE — 6370000000 HC RX 637 (ALT 250 FOR IP): Performed by: STUDENT IN AN ORGANIZED HEALTH CARE EDUCATION/TRAINING PROGRAM

## 2018-03-01 PROCEDURE — 36415 COLL VENOUS BLD VENIPUNCTURE: CPT

## 2018-03-01 PROCEDURE — 85610 PROTHROMBIN TIME: CPT

## 2018-03-01 PROCEDURE — 82947 ASSAY GLUCOSE BLOOD QUANT: CPT

## 2018-03-01 PROCEDURE — 85730 THROMBOPLASTIN TIME PARTIAL: CPT

## 2018-03-01 PROCEDURE — 97530 THERAPEUTIC ACTIVITIES: CPT

## 2018-03-01 PROCEDURE — 80048 BASIC METABOLIC PNL TOTAL CA: CPT

## 2018-03-01 PROCEDURE — 97116 GAIT TRAINING THERAPY: CPT

## 2018-03-01 PROCEDURE — 2580000003 HC RX 258: Performed by: STUDENT IN AN ORGANIZED HEALTH CARE EDUCATION/TRAINING PROGRAM

## 2018-03-01 PROCEDURE — 85025 COMPLETE CBC W/AUTO DIFF WBC: CPT

## 2018-03-01 PROCEDURE — 6360000002 HC RX W HCPCS: Performed by: STUDENT IN AN ORGANIZED HEALTH CARE EDUCATION/TRAINING PROGRAM

## 2018-03-01 RX ORDER — SENNA AND DOCUSATE SODIUM 50; 8.6 MG/1; MG/1
2 TABLET, FILM COATED ORAL DAILY PRN
Status: DISCONTINUED | OUTPATIENT
Start: 2018-03-01 | End: 2018-03-02 | Stop reason: HOSPADM

## 2018-03-01 RX ORDER — POLYETHYLENE GLYCOL 3350 17 G/17G
17 POWDER, FOR SOLUTION ORAL DAILY PRN
Status: DISCONTINUED | OUTPATIENT
Start: 2018-03-01 | End: 2018-03-02 | Stop reason: HOSPADM

## 2018-03-01 RX ORDER — METFORMIN HYDROCHLORIDE 500 MG/1
500 TABLET, EXTENDED RELEASE ORAL
Qty: 60 TABLET | Refills: 11 | Status: SHIPPED | OUTPATIENT
Start: 2018-03-01 | End: 2018-07-18

## 2018-03-01 RX ADMIN — ROPINIROLE 0.5 MG: 0.5 TABLET, FILM COATED ORAL at 19:21

## 2018-03-01 RX ADMIN — ROPINIROLE 0.5 MG: 0.5 TABLET, FILM COATED ORAL at 15:02

## 2018-03-01 RX ADMIN — ROPINIROLE 0.5 MG: 0.5 TABLET, FILM COATED ORAL at 07:54

## 2018-03-01 RX ADMIN — SODIUM CHLORIDE: 9 INJECTION, SOLUTION INTRAVENOUS at 15:08

## 2018-03-01 RX ADMIN — ACETAMINOPHEN 650 MG: 325 TABLET, FILM COATED ORAL at 04:59

## 2018-03-01 RX ADMIN — LINEZOLID 600 MG: 600 INJECTION, SOLUTION INTRAVENOUS at 21:09

## 2018-03-01 RX ADMIN — POLYETHYLENE GLYCOL 3350 17 G: 17 POWDER, FOR SOLUTION ORAL at 10:35

## 2018-03-01 RX ADMIN — SODIUM CHLORIDE: 9 INJECTION, SOLUTION INTRAVENOUS at 04:59

## 2018-03-01 RX ADMIN — ATORVASTATIN CALCIUM 20 MG: 20 TABLET, FILM COATED ORAL at 19:20

## 2018-03-01 RX ADMIN — DOCUSATE SODIUM AND SENNOSIDES 2 TABLET: 8.6; 5 TABLET, FILM COATED ORAL at 10:35

## 2018-03-01 RX ADMIN — LINEZOLID 600 MG: 600 INJECTION, SOLUTION INTRAVENOUS at 10:00

## 2018-03-01 RX ADMIN — FAMOTIDINE 20 MG: 20 TABLET, FILM COATED ORAL at 07:54

## 2018-03-01 RX ADMIN — INSULIN LISPRO 1 UNITS: 100 INJECTION, SOLUTION INTRAVENOUS; SUBCUTANEOUS at 11:43

## 2018-03-01 RX ADMIN — ACETAMINOPHEN 650 MG: 325 TABLET, FILM COATED ORAL at 11:46

## 2018-03-01 RX ADMIN — INSULIN LISPRO 1 UNITS: 100 INJECTION, SOLUTION INTRAVENOUS; SUBCUTANEOUS at 19:52

## 2018-03-01 RX ADMIN — MAGNESIUM HYDROXIDE 30 ML: 400 SUSPENSION ORAL at 07:54

## 2018-03-01 RX ADMIN — ACETAMINOPHEN 650 MG: 325 TABLET, FILM COATED ORAL at 19:20

## 2018-03-01 RX ADMIN — INSULIN LISPRO 1 UNITS: 100 INJECTION, SOLUTION INTRAVENOUS; SUBCUTANEOUS at 16:18

## 2018-03-01 ASSESSMENT — PAIN DESCRIPTION - PAIN TYPE
TYPE: CHRONIC PAIN

## 2018-03-01 ASSESSMENT — ENCOUNTER SYMPTOMS
SHORTNESS OF BREATH: 1
SORE THROAT: 1

## 2018-03-01 ASSESSMENT — PAIN DESCRIPTION - DESCRIPTORS: DESCRIPTORS: OTHER (COMMENT)

## 2018-03-01 ASSESSMENT — PAIN SCALES - GENERAL
PAINLEVEL_OUTOF10: 10
PAINLEVEL_OUTOF10: 4
PAINLEVEL_OUTOF10: 5
PAINLEVEL_OUTOF10: 0

## 2018-03-01 ASSESSMENT — PAIN DESCRIPTION - LOCATION
LOCATION: GENERALIZED
LOCATION: KNEE
LOCATION: HIP

## 2018-03-01 ASSESSMENT — PAIN DESCRIPTION - FREQUENCY: FREQUENCY: CONTINUOUS

## 2018-03-01 ASSESSMENT — PAIN DESCRIPTION - ORIENTATION
ORIENTATION: LEFT
ORIENTATION: RIGHT;LEFT

## 2018-03-01 NOTE — CONSULTS
Dr. Dann Randall notified of new consult. Face sheet was faxed over to the office at .
Patient out for a procedure  Will try again tomorrow  Thanks
REPORTED    CBC auto differential   Result Value Ref Range    WBC 6.7 3.5 - 11.0 k/uL    RBC 3.43 (L) 4.0 - 5.2 m/uL    Hemoglobin 8.5 (L) 12.0 - 16.0 g/dL    Hematocrit 26.6 (L) 36 - 46 %    MCV 77.5 (L) 80 - 100 fL    MCH 24.8 (L) 26 - 34 pg    MCHC 32.0 31 - 37 g/dL    RDW 17.0 (H) 11.5 - 14.9 %    Platelets 390 (H) 310 - 450 k/uL    MPV 8.0 6.0 - 12.0 fL    NRBC Automated NOT REPORTED per 100 WBC    Differential Type NOT REPORTED     Immature Granulocytes NOT REPORTED 0 %    Absolute Immature Granulocyte NOT REPORTED 0.00 - 0.30 k/uL    WBC Morphology NOT REPORTED     RBC Morphology NOT REPORTED     Platelet Estimate NOT REPORTED     Seg Neutrophils 71 (H) 36 - 66 %    Lymphocytes 16 (L) 24 - 44 %    Monocytes 11 (H) 1 - 7 %    Eosinophils % 1 0 - 4 %    Basophils 1 0 - 2 %    Segs Absolute 4.80 1.3 - 9.1 k/uL    Absolute Lymph # 1.10 1.0 - 4.8 k/uL    Absolute Mono # 0.70 0.1 - 1.3 k/uL    Absolute Eos # 0.10 0.0 - 0.4 k/uL    Basophils # 0.10 0.0 - 0.2 k/uL   APTT   Result Value Ref Range    PTT 46.9 (H) 23.0 - 31.0 sec   PROTIME-INR   Result Value Ref Range    Protime 36.6 (H) 9.7 - 12.0 sec    INR 3.8    POC Glucose Fingerstick   Result Value Ref Range    POC Glucose 181 (H) 65 - 105 mg/dL   POC Glucose Fingerstick   Result Value Ref Range    POC Glucose 107 (H) 65 - 105 mg/dL   POC Glucose Fingerstick   Result Value Ref Range    POC Glucose 168 (H) 65 - 105 mg/dL   POC Glucose Fingerstick   Result Value Ref Range    POC Glucose 132 (H) 65 - 105 mg/dL   POC Glucose Fingerstick   Result Value Ref Range    POC Glucose 152 (H) 65 - 105 mg/dL   POC Glucose Fingerstick   Result Value Ref Range    POC Glucose 125 (H) 65 - 105 mg/dL   POC Glucose Fingerstick   Result Value Ref Range    POC Glucose 171 (H) 65 - 105 mg/dL   TYPE AND SCREEN   Result Value Ref Range    Expiration Date 03/02/2018     Arm Band Number [id number]     ABO/Rh A POSITIVE     Antibody Screen NEGATIVE          Examination:      Vital Signs BP

## 2018-03-01 NOTE — PROGRESS NOTES
[address]      [facility]     Progress Note    3/1/2018    9:11 AM    Name:   Suni Code  MRN:     [id number]     Acct:      [de-identified]   Room:   210/2106-  IP Day:  2  Admit Date:  2/27/2018  2:25 PM    PCP:   Brittany Buck CNP  Code Status:  Full Code    Subjective:     C/C:   Chief Complaint   Patient presents with    Anemia     Interval History Status: improved. Saw and examined Pt in AM.  Reports some improvement, c/o mild constipation, had firm BM. BLANE resolving, PT/OT placed, transfer to Med/surg. Brief History:     The patient is a 75 y.o.  female who presents with Anemia   and weakness. Patient was recently discharged from [facility] for UTI and anemia. For the past several days, patient has been having poor PO intake, and loose, non-bloody stools. She also reports few days of tan sputum production and cough. She states that she has been compliant with her metformin, coumadin, and  levaquin for past UTI. Despite this she states that she still is having occasional painful urination. She denies any blood in cough or stool, fever, chills, abdominal pain, nausea, or vomiting. Review of Systems:     ROS      CONSTITUTIONAL:  negative for fevers, chills, sweats, positive for fatigue  HEENT:  negative for vision, hearing changes, runny nose, throat pain  RESPIRATORY:  negative for shortness of breath, cough, congestion, wheezing. CARDIOVASCULAR:  negative for chest pain, palpitations.   GASTROINTESTINAL:  negative for nausea, vomiting, diarrhea, change in bowel habits, abdominal pain, positive for constipation  GENITOURINARY:  negative for difficulty of urination, burning with urination, frequency   INTEGUMENT:  negative for rash, skin lesions, easy bruising   HEMATOLOGIC/LYMPHATIC:  negative for swelling/edema   ALLERGIC/IMMUNOLOGIC:  negative for urticaria , itching  ENDOCRINE:  negative increase in drinking, increase in urination  MUSCULOSKELETAL:  Positive for joint pain  NEUROLOGICAL:  negative for headaches, dizziness, lightheadedness, numbness, pain, tingling extremities, positive for weakness  BEHAVIOR/PSYCH: positive for depression/anxiety    Medications: Allergies:  No Known Allergies    Current Meds:   Scheduled Meds:    linezolid  600 mg Intravenous Q12H    atorvastatin  20 mg Oral Nightly    famotidine  20 mg Oral Daily    rOPINIRole  0.5 mg Oral TID    sodium chloride flush  10 mL Intravenous 2 times per day    insulin lispro  0-6 Units Subcutaneous TID WC    insulin lispro  0-3 Units Subcutaneous Nightly    warfarin (COUMADIN) daily dosing (placeholder)   Other RX Placeholder     Continuous Infusions:    sodium chloride 100 mL/hr at 18 0459    dextrose       PRN Meds: diclofenac sodium, LORazepam, sodium chloride flush, acetaminophen, magnesium hydroxide, ondansetron, glucose, dextrose, glucagon (rDNA), dextrose    Data:     Past Medical History:   has a past medical history of Chronic kidney disease; Diabetes ([address] Hyperlipidemia; Hypertension; Neuropathy ([address] Osteoarthritis; Pulmonary embolism ([address] Type 2 diabetes mellitus without complication ([facility] [address] and Vocal cord paralysis. Social History:   reports that she has never smoked. She has never used smokeless tobacco. She reports that she does not drink alcohol or use drugs.      Family History:   Family History   Problem Relation Age of Onset    Kidney Disease Mother     Cancer Father     Arthritis Brother        Vitals:  /69   Pulse 76   Temp 97 °F (36.1 °C) (Oral)   Resp 15   Ht 5' 4\" (1.626 m)   Wt 170 lb (77.1 kg)   SpO2 97%   BMI 29.18 kg/m²   Temp (24hrs), Av.8 °F (36.6 °C), Min:97 °F (36.1 °C), Max:98.5 °F (36.9 °C)    Recent Labs      18   1134  18   1708  18   2035  18   0637   POCGLU  168*  132*  152*  125* per 100 WBC    Differential Type NOT REPORTED     Immature Granulocytes NOT REPORTED 0 %    Absolute Immature Granulocyte NOT REPORTED 0.00 - 0.30 k/uL    WBC Morphology NOT REPORTED     RBC Morphology NOT REPORTED     Platelet Estimate NOT REPORTED     Seg Neutrophils 71 (H) 36 - 66 %    Lymphocytes 16 (L) 24 - 44 %    Monocytes 11 (H) 1 - 7 %    Eosinophils % 1 0 - 4 %    Basophils 1 0 - 2 %    Segs Absolute 4.80 1.3 - 9.1 k/uL    Absolute Lymph # 1.10 1.0 - 4.8 k/uL    Absolute Mono # 0.70 0.1 - 1.3 k/uL    Absolute Eos # 0.10 0.0 - 0.4 k/uL    Basophils # 0.10 0.0 - 0.2 k/uL   APTT    Collection Time: 03/01/18  4:38 AM   Result Value Ref Range    PTT 46.9 (H) 23.0 - 31.0 sec   PROTIME-INR    Collection Time: 03/01/18  4:38 AM   Result Value Ref Range    Protime 36.6 (H) 9.7 - 12.0 sec    INR 3.8    POC Glucose Fingerstick    Collection Time: 03/01/18  6:37 AM   Result Value Ref Range    POC Glucose 125 (H) 65 - 105 mg/dL       Lab Results   Component Value Date/Time    SPECIAL NOT REPORTED 02/27/2018 09:41 PM    SPECIAL NOT REPORTED 02/27/2018 09:41 PM     Lab Results   Component Value Date/Time    CULTURE NO GROWTH 1 DAY 02/27/2018 07:33 PM    CULTURE  02/27/2018 07:33 PM     Performed at [facility] [address], [address] [phone]       Radiology:    Xr Chest Standard (2 Vw)    Result Date: 2/17/2018  EXAMINATION: TWO VIEWS OF THE CHEST 2/17/2018 COMPARISON: None HISTORY: ORDERING SYSTEM PROVIDED HISTORY: SOB TECHNOLOGIST PROVIDED HISTORY: Reason for exam:->SOB Ordering Physician Provided Reason for Exam: SOB Acuity: Unknown Type of Exam: Initial FINDINGS: Prominent elevation of the right hemidiaphragm. Left basilar airspace opacity, potentially in the lingula. No findings of pneumothorax or pleural effusion. Normal mediastinal, hilar, and cardiac contours. No acute fracture. 1. Left basilar airspace opacity potentially in the lingula, potentially atelectasis or pneumonia.  2. Prominent elevation the right hemidiaphragm suggestive of right phrenic nerve palsy. Xr Hip Left (2-3 Views)    Result Date: 2/26/2018  EXAMINATION: 2 VIEWS OF THE RIGHT HIP; 2 VIEWS OF THE LEFT HIP 2/26/2018 9:29 am COMPARISON: None HISTORY: ORDERING SYSTEM PROVIDED HISTORY: Bilateral hip pain TECHNOLOGIST PROVIDED HISTORY: Reason for exam:->pain Ordering Physician Provided Reason for Exam: pain to both hips for several weeks Acuity: Acute Type of Exam: Initial FINDINGS: Bilateral hip prostheses appear intact without evidence of acute fracture or dislocation. Multiple pelvic phleboliths. Alignment appears intact. Mild osteopenia. Sacroiliac joints show minor degenerative changes. Small subcentimeter well corticated ossific structures adjacent to the left greater trochanter are favored to be from remote trauma or heterotopic ossification. Very small amount of heterotopic ossification by the right hip. Bilateral hip prostheses appear intact with no acute osseous abnormality. Xr Hip Right (2-3 Views)    Result Date: 2/26/2018  EXAMINATION: 2 VIEWS OF THE RIGHT HIP; 2 VIEWS OF THE LEFT HIP 2/26/2018 9:29 am COMPARISON: None HISTORY: ORDERING SYSTEM PROVIDED HISTORY: Bilateral hip pain TECHNOLOGIST PROVIDED HISTORY: Reason for exam:->pain Ordering Physician Provided Reason for Exam: pain to both hips for several weeks Acuity: Acute Type of Exam: Initial FINDINGS: Bilateral hip prostheses appear intact without evidence of acute fracture or dislocation. Multiple pelvic phleboliths. Alignment appears intact. Mild osteopenia. Sacroiliac joints show minor degenerative changes. Small subcentimeter well corticated ossific structures adjacent to the left greater trochanter are favored to be from remote trauma or heterotopic ossification. Very small amount of heterotopic ossification by the right hip. Bilateral hip prostheses appear intact with no acute osseous abnormality.      Xr Chest Weakness   - Anemia, chronic arthritis, recent decreased PO, received 1 u pRBCs at last admission   - Hemoglobin stable 8-9, FOBT ordered   - Iron supplement     Productive cough   - Blood Cx NGTD, Mycoplasma neg, Legionella and strep pneumo both negative    - CXR favors atelectasis, cannot exclude right airspace disease   - Procalcitonin 0.15, CRP 86.5   - Levaquin switched to linezolid for Urine susceptibility    - BSE performed, grossly normal, rec dental soft with thin liquids     UTI  -Positive UA and urine culture 2/26/18 coag neg staph  -Resistant to many abx, susceptible to macrobid and linezolid  -Current BLANE, will start linezolid     BLANE, resolved, baseline Cr < 1.1   - Cr 2.4 at admission, 0.95 today, [address]   - Holding Lisinopril   - Continue IVF, monitor BMP     Arthritis   -Pt saw rheumatology outpatient, bilateral shoulder steroid injections  -Pain control     Hx Massive PE   - Supratherapeutic INR at 3.8   - Hold  coumadin      DMII   - Hold Metformin   - ISS low dose     Depressive symptoms  - Psych consult, appreciate recs  - Chronic low energy, increased sleep, decreased appetite, depressed mood, no interest in activities       Michelle Vásquez MD  3/1/2018  9:11 AM

## 2018-03-01 NOTE — PROGRESS NOTES
[address]   OCCUPATIONAL THERAPY MISSED TREATMENT NOTE   INPATIENT   Date: 3/1/18  Patient Name: Selvin Christine       Room:   MRN: [id number]   Account #: [de-identified]    : 1950  (76 y.o.)  Gender: female   Referring Practitioner: Dr. Kevin Bolanos  Diagnosis: BLANE                REASON FOR MISSED TREATMENT:  Patient with another ancillary department   -  Will reattempt as time permits   DORINA Aguirre

## 2018-03-01 NOTE — PROGRESS NOTES
SURGERY      JOINT REPLACEMENT Bilateral     hip    OTHER SURGICAL HISTORY      vocal cord surgeryi       Restrictions  Restrictions/Precautions  Restrictions/Precautions: Fall Risk  Required Braces or Orthoses?: No  Implants present? : Metal implants (bilateral THRs)  Subjective   General  Chart Reviewed: Yes  Response To Previous Treatment: Patient with no complaints from previous session. Family / Caregiver Present: Yes  Referring Practitioner: Dr. Martinez Loop: Pt is in bed about to use [facility] with RN upon first attempt. Pt is in bed upon arrival for second attempt. Pt has family in room. PT and family is agreeable to PT. General Comment  Comments: RN Jigna calderon's pt for PT. Hx: vocal cord paralysis. Pain Screening  Patient Currently in Pain: Yes  Pain Assessment  Pain Assessment: 0-10  Pain Level:  (did not rate. Pain with SLS ex's. )  Pain Type: Chronic pain  Pain Location: Hip  Pain Orientation: Left  Vital Signs  Patient Currently in Pain: Yes       Orientation   WFL  Objective   Bed mobility  Bridging: Contact guard assistance  Supine to Sit: Contact guard assistance  Sit to Supine: Contact guard assistance  Scooting: Supervision  Comment: Pt dangled at EOB x8 min to complete dynamic sitting activities. Pt requires vc's for technique with good follow through. Pt requires min encouragement to complete. Transfers  Sit to Stand: Contact guard assistance  Stand to sit: Contact guard assistance  Comment: Pt requires vc's for hand placement with good follow through. Ambulation  Ambulation?: Yes  Ambulation 1  Surface: level tile  Device: Rolling Walker  Assistance: Contact guard assistance  Quality of Gait: Pt demos steady gait. No LOB. Pt has diffculty with breathing with amb and talking at the same time. Distance: 150'x2  Comments: Pt requires 2 standing rest breaks to complete.    Stairs/Curb  Stairs?: No     Balance  Posture: Good  Sitting - Static: Good  Sitting - Dynamic: Good  Standing - Static: Good  Standing - Dynamic: Good;-  Comments: Standing balance assessed with RW. Exercises: Standing ex with RW for BUE support: PF/DF, 3 way hip, marches, butt kicks, mini squats. Reps: 15x each. Writer encourages pt to use Unilateral UE support on RW however, pt requires Bilateral UE support on RW to maintain standing balance. Pt Requires seated rest breaks between ex's to complete. Pt reports Left hip pain with SLS ex's. Assessment   Body structures, Functions, Activity limitations: Decreased functional mobility ; Decreased endurance;Decreased strength;Decreased safe awareness;Decreased balance  Assessment: continue per POC to maximize potential for safe D/C home  Prognosis: Good  Activity Tolerance  Activity Tolerance: Patient Tolerated treatment well;Patient limited by endurance       Discharge Recommendations:  24 hour supervision or assist, Home with assist PRN, Home with Home health PT    G-Code     OutComes Score    AM-PAC Score    Goals  Short term goals  Time Frame for Short term goals: 5 days  Short term goal 1: independent bed mobility and transfers supine to sit  Short term goal 2: MOD I for transfers sit to stand and bed to chair using w walker  Short term goal 3: MOD I for gait using w walker 50-80' for household distances  Short term goal 4: pt to demonstrate good technique for LE strengthening exercises  Short term goal 5: pt to ascend/ descend 4 steps w/ rail and AD w/ min x x 1  Patient Goals   Patient goals : return home w/ spouse    Plan    Plan  Times per week: daily x 5 days  Times per day: Daily  Specific instructions for Next Treatment: 2-28-18 gait w/ w walker 45' x 2 w/ CGA x 1, SBA for bed mobility, CGA x 1 for transfers, advance to 4 steps  Current Treatment Recommendations: Strengthening, Home Exercise Program, Safety Education & Training, Balance Training, Patient/Caregiver Education & Training, Functional Mobility Training, Transfer Training, Gait

## 2018-03-02 ENCOUNTER — CARE COORDINATOR VISIT (OUTPATIENT)
Dept: CASE MANAGEMENT | Age: 68
End: 2018-03-02

## 2018-03-02 VITALS
HEIGHT: 64 IN | SYSTOLIC BLOOD PRESSURE: 114 MMHG | RESPIRATION RATE: 24 BRPM | OXYGEN SATURATION: 99 % | BODY MASS INDEX: 30.22 KG/M2 | TEMPERATURE: 97.5 F | WEIGHT: 177.03 LBS | DIASTOLIC BLOOD PRESSURE: 66 MMHG | HEART RATE: 84 BPM

## 2018-03-02 LAB
ABSOLUTE EOS #: 0.07 K/UL (ref 0–0.4)
ABSOLUTE IMMATURE GRANULOCYTE: ABNORMAL K/UL (ref 0–0.3)
ABSOLUTE LYMPH #: 1.26 K/UL (ref 1–4.8)
ABSOLUTE MONO #: 0.7 K/UL (ref 0.1–1.3)
ANION GAP SERPL CALCULATED.3IONS-SCNC: 14 MMOL/L (ref 9–17)
BASOPHILS # BLD: 1 % (ref 0–2)
BASOPHILS ABSOLUTE: 0.07 K/UL (ref 0–0.2)
BUN BLDV-MCNC: 10 MG/DL (ref 8–23)
BUN/CREAT BLD: ABNORMAL (ref 9–20)
CALCIUM SERPL-MCNC: 8.7 MG/DL (ref 8.6–10.4)
CHLORIDE BLD-SCNC: 104 MMOL/L (ref 98–107)
CO2: 21 MMOL/L (ref 20–31)
CREAT SERPL-MCNC: 0.8 MG/DL (ref 0.5–0.9)
DIFFERENTIAL TYPE: ABNORMAL
EOSINOPHILS RELATIVE PERCENT: 1 % (ref 0–4)
GFR AFRICAN AMERICAN: >60 ML/MIN
GFR NON-AFRICAN AMERICAN: >60 ML/MIN
GFR SERPL CREATININE-BSD FRML MDRD: ABNORMAL ML/MIN/{1.73_M2}
GFR SERPL CREATININE-BSD FRML MDRD: ABNORMAL ML/MIN/{1.73_M2}
GLUCOSE BLD-MCNC: 111 MG/DL (ref 65–105)
GLUCOSE BLD-MCNC: 112 MG/DL (ref 65–105)
GLUCOSE BLD-MCNC: 117 MG/DL (ref 70–99)
GLUCOSE BLD-MCNC: 148 MG/DL (ref 65–105)
HCT VFR BLD CALC: 26 % (ref 36–46)
HEMOGLOBIN: 8.3 G/DL (ref 12–16)
IMMATURE GRANULOCYTES: ABNORMAL %
INR BLD: 2
LYMPHOCYTES # BLD: 18 % (ref 24–44)
MCH RBC QN AUTO: 24.6 PG (ref 26–34)
MCHC RBC AUTO-ENTMCNC: 31.8 G/DL (ref 31–37)
MCV RBC AUTO: 77.5 FL (ref 80–100)
MONOCYTES # BLD: 10 % (ref 1–7)
MORPHOLOGY: ABNORMAL
NRBC AUTOMATED: ABNORMAL PER 100 WBC
PARTIAL THROMBOPLASTIN TIME: 37.8 SEC (ref 23–31)
PDW BLD-RTO: 17.1 % (ref 11.5–14.9)
PLATELET # BLD: 460 K/UL (ref 150–450)
PLATELET ESTIMATE: ABNORMAL
PMV BLD AUTO: 8 FL (ref 6–12)
POTASSIUM SERPL-SCNC: 4 MMOL/L (ref 3.7–5.3)
PROTHROMBIN TIME: 20.6 SEC (ref 9.7–12)
RBC # BLD: 3.35 M/UL (ref 4–5.2)
RBC # BLD: ABNORMAL 10*6/UL
SEG NEUTROPHILS: 70 % (ref 36–66)
SEGMENTED NEUTROPHILS ABSOLUTE COUNT: 4.9 K/UL (ref 1.3–9.1)
SODIUM BLD-SCNC: 139 MMOL/L (ref 135–144)
WBC # BLD: 7 K/UL (ref 3.5–11)
WBC # BLD: ABNORMAL 10*3/UL

## 2018-03-02 PROCEDURE — 6360000002 HC RX W HCPCS: Performed by: INTERNAL MEDICINE

## 2018-03-02 PROCEDURE — 80048 BASIC METABOLIC PNL TOTAL CA: CPT

## 2018-03-02 PROCEDURE — 97116 GAIT TRAINING THERAPY: CPT

## 2018-03-02 PROCEDURE — 36415 COLL VENOUS BLD VENIPUNCTURE: CPT

## 2018-03-02 PROCEDURE — 99239 HOSP IP/OBS DSCHRG MGMT >30: CPT | Performed by: INTERNAL MEDICINE

## 2018-03-02 PROCEDURE — 97110 THERAPEUTIC EXERCISES: CPT

## 2018-03-02 PROCEDURE — 82947 ASSAY GLUCOSE BLOOD QUANT: CPT

## 2018-03-02 PROCEDURE — 6370000000 HC RX 637 (ALT 250 FOR IP): Performed by: STUDENT IN AN ORGANIZED HEALTH CARE EDUCATION/TRAINING PROGRAM

## 2018-03-02 PROCEDURE — 2580000003 HC RX 258: Performed by: STUDENT IN AN ORGANIZED HEALTH CARE EDUCATION/TRAINING PROGRAM

## 2018-03-02 PROCEDURE — 97530 THERAPEUTIC ACTIVITIES: CPT

## 2018-03-02 PROCEDURE — 85610 PROTHROMBIN TIME: CPT

## 2018-03-02 PROCEDURE — 6360000002 HC RX W HCPCS: Performed by: STUDENT IN AN ORGANIZED HEALTH CARE EDUCATION/TRAINING PROGRAM

## 2018-03-02 PROCEDURE — 2580000003 HC RX 258: Performed by: INTERNAL MEDICINE

## 2018-03-02 PROCEDURE — 85025 COMPLETE CBC W/AUTO DIFF WBC: CPT

## 2018-03-02 PROCEDURE — 85730 THROMBOPLASTIN TIME PARTIAL: CPT

## 2018-03-02 RX ORDER — FAMOTIDINE 20 MG/1
20 TABLET, FILM COATED ORAL 2 TIMES DAILY
Status: DISCONTINUED | OUTPATIENT
Start: 2018-03-02 | End: 2018-03-02 | Stop reason: HOSPADM

## 2018-03-02 RX ORDER — WARFARIN SODIUM 5 MG/1
5 TABLET ORAL
Status: DISCONTINUED | OUTPATIENT
Start: 2018-03-02 | End: 2018-03-02 | Stop reason: HOSPADM

## 2018-03-02 RX ORDER — NITROFURANTOIN 25; 75 MG/1; MG/1
100 CAPSULE ORAL 2 TIMES DAILY
Qty: 20 CAPSULE | Refills: 0 | Status: SHIPPED | OUTPATIENT
Start: 2018-03-02 | End: 2018-03-12

## 2018-03-02 RX ADMIN — FAMOTIDINE 20 MG: 20 TABLET, FILM COATED ORAL at 08:45

## 2018-03-02 RX ADMIN — LORAZEPAM 0.5 MG: 0.5 TABLET ORAL at 02:39

## 2018-03-02 RX ADMIN — SODIUM CHLORIDE: 9 INJECTION, SOLUTION INTRAVENOUS at 05:18

## 2018-03-02 RX ADMIN — ROPINIROLE 0.5 MG: 0.5 TABLET, FILM COATED ORAL at 08:45

## 2018-03-02 RX ADMIN — ROPINIROLE 0.5 MG: 0.5 TABLET, FILM COATED ORAL at 14:13

## 2018-03-02 RX ADMIN — ACETAMINOPHEN 650 MG: 325 TABLET, FILM COATED ORAL at 15:09

## 2018-03-02 RX ADMIN — ACETAMINOPHEN 650 MG: 325 TABLET, FILM COATED ORAL at 02:39

## 2018-03-02 RX ADMIN — LINEZOLID 600 MG: 600 INJECTION, SOLUTION INTRAVENOUS at 11:40

## 2018-03-02 RX ADMIN — IRON SUCROSE 200 MG: 20 INJECTION, SOLUTION INTRAVENOUS at 14:13

## 2018-03-02 ASSESSMENT — PAIN SCALES - GENERAL
PAINLEVEL_OUTOF10: 5
PAINLEVEL_OUTOF10: 10
PAINLEVEL_OUTOF10: 5
PAINLEVEL_OUTOF10: 8
PAINLEVEL_OUTOF10: 2

## 2018-03-02 ASSESSMENT — PAIN DESCRIPTION - PAIN TYPE
TYPE: CHRONIC PAIN
TYPE: ACUTE PAIN
TYPE: ACUTE PAIN
TYPE: CHRONIC PAIN
TYPE: CHRONIC PAIN

## 2018-03-02 ASSESSMENT — PAIN DESCRIPTION - ORIENTATION
ORIENTATION: LEFT
ORIENTATION: LEFT

## 2018-03-02 ASSESSMENT — PAIN DESCRIPTION - FREQUENCY
FREQUENCY: CONTINUOUS
FREQUENCY: INTERMITTENT

## 2018-03-02 ASSESSMENT — PAIN DESCRIPTION - ONSET: ONSET: ON-GOING

## 2018-03-02 ASSESSMENT — PAIN DESCRIPTION - LOCATION
LOCATION: HIP
LOCATION: HEAD
LOCATION: HIP
LOCATION: GENERALIZED

## 2018-03-02 ASSESSMENT — PAIN DESCRIPTION - DESCRIPTORS
DESCRIPTORS: ACHING
DESCRIPTORS: ACHING

## 2018-03-02 ASSESSMENT — PAIN DESCRIPTION - PROGRESSION
CLINICAL_PROGRESSION: NOT CHANGED
CLINICAL_PROGRESSION: NOT CHANGED

## 2018-03-02 NOTE — DISCHARGE SUMMARY
check. Test Blood Sugars 2 Times Daily. Dx: E 11.22, N 18.3 type II DM             diclofenac sodium 1 % GEL  Apply 2 g topically 4 times daily as needed for Pain (Local application)             diphenhydrAMINE (ALLERGY RELIEF) 25 MG tablet  Take 25 mg by mouth every 6 hours as needed for Itching             docusate sodium (COLACE) 100 MG capsule  Take 1 capsule by mouth 2 times daily             famotidine (PEPCID) 20 MG tablet  Take 1 tablet by mouth 2 times daily             ferrous sulfate 325 (65 Fe) MG tablet  Take 1 tablet by mouth daily (with breakfast)             Glucose Blood (BLOOD GLUCOSE TEST STRIPS) STRP  Test_2__times daily    Diagnosis: 250.0   Diabetes Mellitus______Non-Insulin Dependent             LORazepam (ATIVAN) 0.5 MG tablet  TAKE ONE TABLET BY MOUTH EVERY 12 HOURS AS NEEDED FOR ANXIETY             metFORMIN (GLUCOPHAGE-XR) 500 MG extended release tablet  Take 1 tablet by mouth 2 times daily (before meals)             nitrofurantoin, macrocrystal-monohydrate, (MACROBID) 100 MG capsule  Take 1 capsule by mouth 2 times daily for 10 days             omeprazole (PRILOSEC) 20 MG delayed release capsule  Take 1 capsule by mouth Daily             rOPINIRole (REQUIP) 0.5 MG tablet  Take 1 tablet by mouth 3 times daily             warfarin (COUMADIN) 3 MG tablet  Take 1 tablet by mouth daily Take 3 mg 2/20, 2/21, 2/22 repeat INR 2/23 to determine further dosing                 DISPOSITION AND FOLLOW-UP     Disposition:Home    Condition: Stable     Diet:  Regular diet     Activity: As tolerated     Follow-up:   with Cintia Alves CNP,    Discharge time spent on pt and paperworki more than MD LALO [address], [address] [place] [address].    Phone [phone]   Fax: [phone]  Answering Service: [phone]

## 2018-03-02 NOTE — PROGRESS NOTES
Physical Therapy  Facility/Department: [facility] MED SURG  Daily Treatment Note  NAME: Scotty Montoya  : 1950  MRN: [id number]    Date of Service: 3/2/2018    Patient Diagnosis(es):   Patient Active Problem List    Diagnosis Date Noted    Supratherapeutic INR 2018    Moderate malnutrition ([address] 2018    Pneumonia due to organism     Pneumonia 2018    Sepsis ([address] 2018    Constipation 2018    UTI (urinary tract infection) 2018    BLANE (acute kidney injury) ([address] 2018    Hx of pulmonary embolus 2018    Mixed hyperlipidemia 2017    Essential (primary) hypertension 2017    Anxiety 2017    Gastroesophageal reflux disease without esophagitis 2017    Restless leg syndrome 2017    Pulmonary embolism ([address] 2017    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 2014    Failure to wean from mechanical ventilation (HCC) 03/10/2014    Respiratory failure (HCC) 03/10/2014    Decreased muscle tone 2014    Acute respiratory failure (HCC) 2014    Cardiac arrest due to underlying cardiac condition ([address] 2014    Coagulopathy ([address] 2014    DVT of popliteal vein (HCC) 2014    Type II diabetes mellitus ([address] 2014    Benign essential HTN 2013    Acid reflux 2013    History of repair of hip joint 2013    Osteoarthritis 2013    CD (contact dermatitis) 2004    Scar condition and fibrosis of skin 2004    Lumbar canal stenosis 2004       Past Medical History:   Diagnosis Date    Chronic kidney disease     Diabetes ([address]     Hyperlipidemia     Hypertension     Neuropathy ([address]     Osteoarthritis     Pulmonary embolism ([address]         Type 2 diabetes mellitus without complication (HCC)     Vocal cord paralysis      Past Surgical History:   Procedure Laterality Date    BREAST SURGERY      reduction    COLONOSCOPY      FOOT SURGERY below 90 degrees shoulder flexion x 15 reps, seated LE ex x 20 reps, supine bilateral LEs x 5-10 reps- spouse observed, pt given written copies of HEP                        Assessment   Body structures, Functions, Activity limitations: Decreased functional mobility ; Decreased endurance;Decreased strength;Decreased safe awareness;Decreased balance  Assessment: continue per POC to maximize potential for safe D/C home  Treatment Diagnosis: impaired mobility due to debilitation  Specific instructions for Next Treatment: 3-2-18 gait w/ w walker 60' x 2 w/ CGA x 1, SBA for bed mobility, CGA x 1 for transfers,given written copy of HEP,  advance to 4 steps  Prognosis: Good  Patient Education: written copies of seated and supine exercises  Barriers to Learning: none  REQUIRES PT FOLLOW UP: Yes  Activity Tolerance  Activity Tolerance: Patient Tolerated treatment well  PT Equipment Recommendations  Equipment Needed: No       Discharge Recommendations:  24 hour supervision or assist, Home with assist PRN, Home with Home health PT    G-Code     OutComes Score                                                    AM-PAC Score  AM-PAC Inpatient Mobility Raw Score : 16  AM-PAC Inpatient T-Scale Score : 40.78  Mobility Inpatient CMS 0-100% Score: 54.16  Mobility Inpatient CMS G-Code Modifier : CK          Goals  Short term goals  Time Frame for Short term goals: 5 days  Short term goal 1: independent bed mobility and transfers supine to sit  Short term goal 2: MOD I for transfers sit to stand and bed to chair using w walker  Short term goal 3: MOD I for gait using w walker 50-80' for household distances  Short term goal 4: pt to demonstrate good technique for LE strengthening exercises  Short term goal 5: pt to ascend/ descend 4 steps w/ rail and AD w/ min x x 1  Patient Goals   Patient goals : return home w/ spouse    Plan    Plan  Times per week: daily x 5 days  Times per day: Daily  Specific instructions for Next Treatment: 3-2-18 gait w/ w walker 60' x 2 w/ CGA x 1, SBA for bed mobility, CGA x 1 for transfers,given written copy of HEP,  advance to 4 steps  Current Treatment Recommendations: Strengthening, Home Exercise Program, Safety Education & Training, Balance Training, Patient/Caregiver Education & Training, Functional Mobility Training, Transfer Training, Gait Training, Stair training  Safety Devices  Type of devices:  All fall risk precautions in place, Call light within reach, Gait belt, Patient at risk for falls, Left in bed (spouse present)     Therapy Time   Individual Concurrent Group Co-treatment   Time In 0902         Time Out 0948         Minutes [facility],

## 2018-03-02 NOTE — FLOWSHEET NOTE
Patient and  engaged in conversation with Elise Moran about their life and patient's medical challenges. They are very supportive of each other. 03/02/18 1446   Encounter Summary   Services provided to: Patient and family together   Referral/Consult From: [address] [place] [address]; Children   Continue Visiting (3-2-18)   Complexity of Encounter Moderate   Length of Encounter 15 minutes   Spiritual Assessment Completed Yes   Routine   Type Follow up   Spiritual/Adventist   Type Spiritual support   Assessment Calm; Approachable   Intervention Explored feelings, thoughts, concerns; Active listening;Explored coping resources;Nurtured hope;Sustaining presence/ Ministry of presence; Discussed illness/injury and it's impact   Outcome Expressed gratitude;Engaged in conversation;Expressed feelings/needs/concerns;Coping

## 2018-03-02 NOTE — PROGRESS NOTES
Bedside reporting done, per Johnny RN's  No c/o of pain or SOB at this time  IV  Intact, no redness noted , IV tubing labeled  Pt's family cont.  To visit at bedside

## 2018-03-02 NOTE — CARE COORDINATION
[address] Transitions Interview     3/2/2018    Patient: Renee Byrnes Patient : 1950   MRN: [id number]  Reason for Admission: There are no discharge diagnoses documented for the most recent discharge. 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    Cardiac arrest due to underlying cardiac condition ([address]    CD (contact dermatitis)    Coagulopathy (HCC)    DVT of popliteal vein (HCC)    Benign essential HTN    Failure to wean from mechanical ventilation (HCC)    Acid reflux    History of repair of hip joint    Osteoarthritis    Respiratory failure (HCC)    Decreased muscle tone    Scar condition and fibrosis of skin    Lumbar canal stenosis    Type II diabetes mellitus ([address]    Pneumonia    Sepsis ([address]    Constipation    UTI (urinary tract infection)    BLANE (acute kidney injury) ([address]    Hx of pulmonary embolus    Moderate malnutrition ([address]    Pneumonia due to organism    Supratherapeutic INR       Inpatient Assessment  Care Transitions Summary    Care Transitions Inpatient Review  Medication Review  Do you have all of your prescriptions and are they filled?:  Yes   Are you able to afford your medications?:  Yes  Housing Review  Who do you live with?:  Partner/Spouse/SO  Social Support  Durable Medical Equipment  Patient DME:  Straight cane, Other, Scooter  Other Patient DME:  glucometer  Functional Review  Ability to seek help/take action for Emergent/Urgent situations i.e. fire, crime, inclement weather or health crisis. :  Independent  Ability handle personal hygiene needs (bathing/dressing/grooming):   Independent  Ability to manage medications: Independent  Ability to prepare food:  Independent  Ability to maintain home (clean home, laundry): Independent  Ability to drive and/or has transportation:  Independent  Ability to do shopping:  Independent  Ability to manage finances:   Independent  Is patient able to live independently?:  Yes  Hearing and Vision  Visual Impairment:  Visual impairment (Glasses/contacts)  Care Transitions Interventions     Patient still refusing vns, writer discussed with her that she was just here and she said she has all the help she needs at home, agreed to care transitions, will continue to follow//JU    Follow Up  Future Appointments  Date Time Provider Rachael Garsia   [facility]2018 9[facility]   3/26/2018 10:10 AM Nirali Ivey MD [facility] None       Health Maintenance  Health Maintenance Due   Topic Date Due    Breast cancer screen  09/28/1999    DEXA (modify frequency per FRAX score)  09/28/2014       Debi Garcia, RN

## 2018-03-03 ENCOUNTER — CARE COORDINATION (OUTPATIENT)
Dept: CASE MANAGEMENT | Age: 68
End: 2018-03-03

## 2018-03-03 DIAGNOSIS — N39.0 URINARY TRACT INFECTION WITHOUT HEMATURIA, SITE UNSPECIFIED: Primary | ICD-10-CM

## 2018-03-05 ENCOUNTER — OFFICE VISIT (OUTPATIENT)
Dept: PRIMARY CARE CLINIC | Age: 68
End: 2018-03-05
Payer: MEDICARE

## 2018-03-05 ENCOUNTER — TELEPHONE (OUTPATIENT)
Dept: PHARMACY | Age: 68
End: 2018-03-05

## 2018-03-05 ENCOUNTER — HOSPITAL ENCOUNTER (OUTPATIENT)
Age: 68
Discharge: HOME | End: 2018-03-05
Payer: MEDICARE

## 2018-03-05 VITALS
HEIGHT: 64 IN | WEIGHT: 173.8 LBS | RESPIRATION RATE: 18 BRPM | DIASTOLIC BLOOD PRESSURE: 72 MMHG | SYSTOLIC BLOOD PRESSURE: 120 MMHG | HEART RATE: 96 BPM | BODY MASS INDEX: 29.67 KG/M2

## 2018-03-05 DIAGNOSIS — D68.9 COAGULOPATHY (HCC): ICD-10-CM

## 2018-03-05 DIAGNOSIS — D50.8 IRON DEFICIENCY ANEMIA SECONDARY TO INADEQUATE DIETARY IRON INTAKE: ICD-10-CM

## 2018-03-05 DIAGNOSIS — F41.9 ANXIETY: ICD-10-CM

## 2018-03-05 DIAGNOSIS — I10 ESSENTIAL (PRIMARY) HYPERTENSION: ICD-10-CM

## 2018-03-05 DIAGNOSIS — N17.9 AKI (ACUTE KIDNEY INJURY) (HCC): Primary | ICD-10-CM

## 2018-03-05 DIAGNOSIS — G25.81 RESTLESS LEG SYNDROME: ICD-10-CM

## 2018-03-05 DIAGNOSIS — Z86.718 HX OF BLOOD CLOTS: ICD-10-CM

## 2018-03-05 DIAGNOSIS — R79.1 SUPRATHERAPEUTIC INR: ICD-10-CM

## 2018-03-05 DIAGNOSIS — E78.2 MIXED HYPERLIPIDEMIA: ICD-10-CM

## 2018-03-05 DIAGNOSIS — Z86.711 HX OF PULMONARY EMBOLUS: ICD-10-CM

## 2018-03-05 LAB
HCT VFR BLD CALC: 29.6 % (ref 36–46)
HEMOGLOBIN: 9.6 G/DL (ref 12–16)
INR BLD: 1.7
MCH RBC QN AUTO: 25.3 PG (ref 26–34)
MCHC RBC AUTO-ENTMCNC: 32.3 G/DL (ref 31–37)
MCV RBC AUTO: 78.4 FL (ref 80–100)
NRBC AUTOMATED: ABNORMAL PER 100 WBC
PDW BLD-RTO: 17.4 % (ref 12.5–15.4)
PLATELET # BLD: 488 K/UL (ref 140–450)
PMV BLD AUTO: 7.7 FL (ref 6–12)
PROTHROMBIN TIME: 17.1 SEC (ref 9.4–12.6)
RBC # BLD: 3.78 M/UL (ref 4–5.2)
WBC # BLD: 10.2 K/UL (ref 3.5–11)

## 2018-03-05 PROCEDURE — 1123F ACP DISCUSS/DSCN MKR DOCD: CPT | Performed by: NURSE PRACTITIONER

## 2018-03-05 PROCEDURE — G8400 PT W/DXA NO RESULTS DOC: HCPCS | Performed by: NURSE PRACTITIONER

## 2018-03-05 PROCEDURE — 85027 COMPLETE CBC AUTOMATED: CPT

## 2018-03-05 PROCEDURE — 1036F TOBACCO NON-USER: CPT | Performed by: NURSE PRACTITIONER

## 2018-03-05 PROCEDURE — 4040F PNEUMOC VAC/ADMIN/RCVD: CPT | Performed by: NURSE PRACTITIONER

## 2018-03-05 PROCEDURE — 1111F DSCHRG MED/CURRENT MED MERGE: CPT | Performed by: NURSE PRACTITIONER

## 2018-03-05 PROCEDURE — 1090F PRES/ABSN URINE INCON ASSESS: CPT | Performed by: NURSE PRACTITIONER

## 2018-03-05 PROCEDURE — G8427 DOCREV CUR MEDS BY ELIG CLIN: HCPCS | Performed by: NURSE PRACTITIONER

## 2018-03-05 PROCEDURE — G8417 CALC BMI ABV UP PARAM F/U: HCPCS | Performed by: NURSE PRACTITIONER

## 2018-03-05 PROCEDURE — 36415 COLL VENOUS BLD VENIPUNCTURE: CPT

## 2018-03-05 PROCEDURE — 99214 OFFICE O/P EST MOD 30 MIN: CPT | Performed by: NURSE PRACTITIONER

## 2018-03-05 PROCEDURE — 85610 PROTHROMBIN TIME: CPT

## 2018-03-05 PROCEDURE — 3014F SCREEN MAMMO DOC REV: CPT | Performed by: NURSE PRACTITIONER

## 2018-03-05 PROCEDURE — 3017F COLORECTAL CA SCREEN DOC REV: CPT | Performed by: NURSE PRACTITIONER

## 2018-03-05 PROCEDURE — G8484 FLU IMMUNIZE NO ADMIN: HCPCS | Performed by: NURSE PRACTITIONER

## 2018-03-05 ASSESSMENT — ENCOUNTER SYMPTOMS
SORE THROAT: 0
TROUBLE SWALLOWING: 0
WHEEZING: 0
NAUSEA: 0
DIARRHEA: 0
SINUS PRESSURE: 0
SHORTNESS OF BREATH: 0
VOMITING: 0
CONSTIPATION: 0
COUGH: 0
BLOOD IN STOOL: 0
ABDOMINAL PAIN: 0

## 2018-03-05 NOTE — TELEPHONE ENCOUNTER
Received referral for Anticoagulation Clinic for this patient from Nola Hansen, Nurse Practitioner. Patient had INR today, 1.7. Dose adjusted to 4 mg MWFSun and 3 mg Tues/Thurs/Sat. Due for INR check on 3/12. Called mobile number listed, [phone]. No answer and voicemail is not set up. Will continue to try to reach patient. No other telephone numbers on file.

## 2018-03-05 NOTE — PROGRESS NOTES
start going to Coumadin clinic for management, referral has been placed  Leg swellingmild, advised compression socks, keep legs elevated as much as possible, explained likely due to recent hospital stay and kidney injury, call if worsens or does not improve   Patient given educational materials - see patient instructions. Discussed use, benefit, and side effects of prescribed medications. All patient questions answered. Pt voiced understanding. Reviewed health maintenance. Instructed to continue current medications, diet and exercise. Patient agreed with treatment plan. Follow up as directed.      Electronically signed by Nikolai Daley CNP on 3/5/2018 at 5:37 PM

## 2018-03-06 LAB
CULTURE: NORMAL
Lab: NORMAL
SPECIMEN DESCRIPTION: NORMAL
STATUS: NORMAL
STATUS: NORMAL

## 2018-03-09 ENCOUNTER — CARE COORDINATION (OUTPATIENT)
Dept: CASE MANAGEMENT | Age: 68
End: 2018-03-09

## 2018-03-09 DIAGNOSIS — R79.1 SUPRATHERAPEUTIC INR: Primary | ICD-10-CM

## 2018-03-09 PROCEDURE — 1111F DSCHRG MED/CURRENT MED MERGE: CPT

## 2018-03-09 NOTE — CARE COORDINATION
[facility] Transitions Initial Follow Up Call    Call within 2 business days of discharge: Yes    Patient: Ninfa Shahid Patient : 1950   MRN: [id number]  Reason for Admission: There are no discharge diagnoses documented for the most recent discharge.   Discharge Date: 3/2/18 RARS: [facility] Risk Score: 29     Spoke with: Colby Edgar, patient's     Facility: [facility]    Non-face-to-face services provided:  Obtained and reviewed discharge summary and/or continuity of care documents  Assessment and support for treatment adherence and medication management-reviewed discharge medciations, also had already done with NP at TCM visit, 1111F order dropped    Care Transitions 24 Hour Call    Schedule Follow Up Appointment with PCP:  Declined  Do you have any ongoing symptoms?:  Yes  Patient-reported symptoms:  Other (Comment: cold)  Do you have a copy of your discharge instructions?:  Yes  Do you have all of your prescriptions and are they filled?:  Yes  Have you scheduled your follow up appointment?:  Yes  How are you going to get to your appointment?:  Car - family or friend to transport  Were you discharged with any Home Care or Post Acute Services:  No  Patient DME:  Straight cane, Other, Scooter  Other Patient DME:  glucometer  Do you have support at home?:  Partner/Spouse/SO  Do you feel like you have everything you need to keep you well at home?:  Yes  Care Transitions Interventions     Patient doing well, no needs or concerns, will continue to follow//JU    Follow Up  Future Appointments  Date Time Provider Rachael Garsia   3/12/2018 8:30 AM [facility]   3/19/2018 9:20 AM Nory Bonilla CNP [facility]   3/20/2018 11:15 AM Krish Fairbanks DPM [facility]   3/26/2018 10:10 AM Nory Fitzpatrick MD [facility] None       Linda Carrero, RN

## 2018-03-12 ENCOUNTER — HOSPITAL ENCOUNTER (OUTPATIENT)
Dept: PHARMACY | Age: 68
Setting detail: THERAPIES SERIES
Discharge: HOME | End: 2018-03-12
Payer: MEDICARE

## 2018-03-12 LAB
INR BLD: 2.9
PROTIME: 35.1 SECONDS

## 2018-03-12 PROCEDURE — 85610 PROTHROMBIN TIME: CPT

## 2018-03-12 PROCEDURE — 99211 OFF/OP EST MAY X REQ PHY/QHP: CPT

## 2018-03-12 RX ORDER — WARFARIN SODIUM 4 MG/1
4 TABLET ORAL
COMMUNITY
End: 2018-04-09 | Stop reason: ALTCHOICE

## 2018-03-12 RX ORDER — WARFARIN SODIUM 3 MG/1
3 TABLET ORAL SEE ADMIN INSTRUCTIONS
COMMUNITY
End: 2019-07-20 | Stop reason: SDUPTHER

## 2018-03-15 ENCOUNTER — CARE COORDINATION (OUTPATIENT)
Dept: CASE MANAGEMENT | Age: 68
End: 2018-03-15

## 2018-03-19 ENCOUNTER — HOSPITAL ENCOUNTER (OUTPATIENT)
Dept: PHARMACY | Age: 68
Setting detail: THERAPIES SERIES
Discharge: HOME | End: 2018-03-19
Payer: MEDICARE

## 2018-03-19 ENCOUNTER — HOSPITAL ENCOUNTER (OUTPATIENT)
Age: 68
Discharge: HOME | End: 2018-03-19
Payer: MEDICARE

## 2018-03-19 ENCOUNTER — HOSPITAL ENCOUNTER (OUTPATIENT)
Age: 68
Setting detail: SPECIMEN
Discharge: HOME | End: 2018-03-19
Payer: MEDICARE

## 2018-03-19 ENCOUNTER — OFFICE VISIT (OUTPATIENT)
Dept: PRIMARY CARE CLINIC | Age: 68
End: 2018-03-19
Payer: MEDICARE

## 2018-03-19 ENCOUNTER — HOSPITAL ENCOUNTER (OUTPATIENT)
Dept: GENERAL RADIOLOGY | Age: 68
Discharge: HOME | End: 2018-03-21
Payer: MEDICARE

## 2018-03-19 ENCOUNTER — HOSPITAL ENCOUNTER (OUTPATIENT)
Age: 68
Discharge: HOME | End: 2018-03-21
Payer: MEDICARE

## 2018-03-19 VITALS
HEART RATE: 104 BPM | RESPIRATION RATE: 18 BRPM | HEIGHT: 63 IN | SYSTOLIC BLOOD PRESSURE: 122 MMHG | WEIGHT: 163.2 LBS | BODY MASS INDEX: 28.92 KG/M2 | DIASTOLIC BLOOD PRESSURE: 72 MMHG | OXYGEN SATURATION: 95 %

## 2018-03-19 DIAGNOSIS — R93.89 ABNORMAL CHEST X-RAY: Primary | ICD-10-CM

## 2018-03-19 DIAGNOSIS — N39.0 URINARY TRACT INFECTION WITHOUT HEMATURIA, SITE UNSPECIFIED: ICD-10-CM

## 2018-03-19 DIAGNOSIS — F41.9 ANXIETY: ICD-10-CM

## 2018-03-19 DIAGNOSIS — R05.9 COUGH: ICD-10-CM

## 2018-03-19 DIAGNOSIS — I10 ESSENTIAL HYPERTENSION: ICD-10-CM

## 2018-03-19 DIAGNOSIS — N18.30 CKD (CHRONIC KIDNEY DISEASE), STAGE III (HCC): ICD-10-CM

## 2018-03-19 DIAGNOSIS — K21.9 GASTROESOPHAGEAL REFLUX DISEASE WITHOUT ESOPHAGITIS: ICD-10-CM

## 2018-03-19 DIAGNOSIS — D50.9 IRON DEFICIENCY ANEMIA, UNSPECIFIED IRON DEFICIENCY ANEMIA TYPE: Primary | ICD-10-CM

## 2018-03-19 DIAGNOSIS — I10 ESSENTIAL (PRIMARY) HYPERTENSION: ICD-10-CM

## 2018-03-19 DIAGNOSIS — D50.9 IRON DEFICIENCY ANEMIA, UNSPECIFIED IRON DEFICIENCY ANEMIA TYPE: ICD-10-CM

## 2018-03-19 DIAGNOSIS — M62.81 MUSCLE WEAKNESS: ICD-10-CM

## 2018-03-19 LAB
-: ABNORMAL
ABSOLUTE EOS #: 0.11 K/UL (ref 0–0.4)
ABSOLUTE IMMATURE GRANULOCYTE: ABNORMAL K/UL (ref 0–0.3)
ABSOLUTE LYMPH #: 1.48 K/UL (ref 1–4.8)
ABSOLUTE MONO #: 0.74 K/UL (ref 0.1–1.3)
AMORPHOUS: ABNORMAL
ANION GAP SERPL CALCULATED.3IONS-SCNC: 14 MMOL/L (ref 9–17)
BACTERIA: ABNORMAL
BASOPHILS # BLD: 1 % (ref 0–2)
BASOPHILS ABSOLUTE: 0.11 K/UL (ref 0–0.2)
BILIRUBIN URINE: NEGATIVE
BILIRUBIN, POC: NORMAL
BLOOD URINE, POC: NORMAL
BUN BLDV-MCNC: 13 MG/DL (ref 8–23)
BUN/CREAT BLD: ABNORMAL (ref 9–20)
CALCIUM SERPL-MCNC: 9.2 MG/DL (ref 8.6–10.4)
CASTS UA: ABNORMAL /LPF (ref 0–2)
CHLORIDE BLD-SCNC: 101 MMOL/L (ref 98–107)
CLARITY, POC: CLEAR
CO2: 24 MMOL/L (ref 20–31)
COLOR, POC: NORMAL
COLOR: YELLOW
COMMENT UA: ABNORMAL
CREAT SERPL-MCNC: 0.76 MG/DL (ref 0.5–0.9)
CREATININE URINE: 263.9 MG/DL (ref 28–217)
CRYSTALS, UA: ABNORMAL /HPF
DIFFERENTIAL TYPE: ABNORMAL
EOSINOPHILS RELATIVE PERCENT: 1 % (ref 0–4)
EPITHELIAL CELLS UA: ABNORMAL /HPF (ref 0–5)
GFR AFRICAN AMERICAN: >60 ML/MIN
GFR NON-AFRICAN AMERICAN: >60 ML/MIN
GFR SERPL CREATININE-BSD FRML MDRD: ABNORMAL ML/MIN/{1.73_M2}
GFR SERPL CREATININE-BSD FRML MDRD: ABNORMAL ML/MIN/{1.73_M2}
GLUCOSE BLD-MCNC: 148 MG/DL (ref 70–99)
GLUCOSE URINE, POC: NORMAL
GLUCOSE URINE: ABNORMAL
HCT VFR BLD CALC: 34.7 % (ref 36–46)
HCT VFR BLD CALC: 35.2 % (ref 36–46)
HEMOGLOBIN: 10.9 G/DL (ref 12–16)
HEMOGLOBIN: 11.1 G/DL (ref 12–16)
IMMATURE GRANULOCYTES: ABNORMAL %
INR BLD: 4.6
IRON SATURATION: 11 % (ref 20–55)
IRON: 27 UG/DL (ref 37–145)
KETONES, POC: NORMAL
KETONES, URINE: NEGATIVE
LEUKOCYTE EST, POC: NORMAL
LEUKOCYTE ESTERASE, URINE: ABNORMAL
LYMPHOCYTES # BLD: 14 % (ref 24–44)
MCH RBC QN AUTO: 25.4 PG (ref 26–34)
MCH RBC QN AUTO: 25.8 PG (ref 26–34)
MCHC RBC AUTO-ENTMCNC: 30.8 G/DL (ref 31–37)
MCHC RBC AUTO-ENTMCNC: 31.9 G/DL (ref 31–37)
MCV RBC AUTO: 80.8 FL (ref 80–100)
MCV RBC AUTO: 82.4 FL (ref 80–100)
MONOCYTES # BLD: 7 % (ref 1–7)
MORPHOLOGY: ABNORMAL
MUCUS: ABNORMAL
NITRITE, POC: NORMAL
NITRITE, URINE: NEGATIVE
NRBC AUTOMATED: ABNORMAL PER 100 WBC
NRBC AUTOMATED: ABNORMAL PER 100 WBC
OTHER OBSERVATIONS UA: ABNORMAL
PDW BLD-RTO: 20.1 % (ref 11.5–14.9)
PDW BLD-RTO: 20.3 % (ref 11.5–14.9)
PH UA: 6 (ref 5–8)
PH, POC: 6
PLATELET # BLD: 461 K/UL (ref 150–450)
PLATELET # BLD: 462 K/UL (ref 150–450)
PLATELET ESTIMATE: ABNORMAL
PMV BLD AUTO: 9.2 FL (ref 6–12)
PMV BLD AUTO: 9.3 FL (ref 6–12)
POTASSIUM SERPL-SCNC: 4.1 MMOL/L (ref 3.7–5.3)
PROTEIN UA: NEGATIVE
PROTEIN, POC: 30
PROTIME: 55 SECONDS
RBC # BLD: 4.27 M/UL (ref 4–5.2)
RBC # BLD: 4.29 M/UL (ref 4–5.2)
RBC # BLD: ABNORMAL 10*6/UL
RBC UA: ABNORMAL /HPF (ref 0–2)
RENAL EPITHELIAL, UA: ABNORMAL /HPF
SEG NEUTROPHILS: 77 % (ref 36–66)
SEGMENTED NEUTROPHILS ABSOLUTE COUNT: 8.16 K/UL (ref 1.3–9.1)
SODIUM BLD-SCNC: 139 MMOL/L (ref 135–144)
SPECIFIC GRAVITY UA: 1.03 (ref 1–1.03)
SPECIFIC GRAVITY, POC: 1.02
TOTAL IRON BINDING CAPACITY: 252 UG/DL (ref 250–450)
TOTAL PROTEIN, URINE: 41 MG/DL
TRICHOMONAS: ABNORMAL
TURBIDITY: ABNORMAL
UNSATURATED IRON BINDING CAPACITY: 225 UG/DL (ref 112–347)
URINE HGB: NEGATIVE
UROBILINOGEN, POC: 0.2
UROBILINOGEN, URINE: NORMAL
WBC # BLD: 10.6 K/UL (ref 3.5–11)
WBC # BLD: 11.2 K/UL (ref 3.5–11)
WBC # BLD: ABNORMAL 10*3/UL
WBC UA: ABNORMAL /HPF (ref 0–5)
YEAST: ABNORMAL

## 2018-03-19 PROCEDURE — 1036F TOBACCO NON-USER: CPT | Performed by: NURSE PRACTITIONER

## 2018-03-19 PROCEDURE — 85610 PROTHROMBIN TIME: CPT

## 2018-03-19 PROCEDURE — 3017F COLORECTAL CA SCREEN DOC REV: CPT | Performed by: NURSE PRACTITIONER

## 2018-03-19 PROCEDURE — 1090F PRES/ABSN URINE INCON ASSESS: CPT | Performed by: NURSE PRACTITIONER

## 2018-03-19 PROCEDURE — 99211 OFF/OP EST MAY X REQ PHY/QHP: CPT

## 2018-03-19 PROCEDURE — 83550 IRON BINDING TEST: CPT

## 2018-03-19 PROCEDURE — 81003 URINALYSIS AUTO W/O SCOPE: CPT | Performed by: NURSE PRACTITIONER

## 2018-03-19 PROCEDURE — G8482 FLU IMMUNIZE ORDER/ADMIN: HCPCS | Performed by: NURSE PRACTITIONER

## 2018-03-19 PROCEDURE — 1111F DSCHRG MED/CURRENT MED MERGE: CPT | Performed by: NURSE PRACTITIONER

## 2018-03-19 PROCEDURE — 80048 BASIC METABOLIC PNL TOTAL CA: CPT

## 2018-03-19 PROCEDURE — 99215 OFFICE O/P EST HI 40 MIN: CPT | Performed by: NURSE PRACTITIONER

## 2018-03-19 PROCEDURE — 3014F SCREEN MAMMO DOC REV: CPT | Performed by: NURSE PRACTITIONER

## 2018-03-19 PROCEDURE — 1123F ACP DISCUSS/DSCN MKR DOCD: CPT | Performed by: NURSE PRACTITIONER

## 2018-03-19 PROCEDURE — 85025 COMPLETE CBC W/AUTO DIFF WBC: CPT

## 2018-03-19 PROCEDURE — 83540 ASSAY OF IRON: CPT

## 2018-03-19 PROCEDURE — 4040F PNEUMOC VAC/ADMIN/RCVD: CPT | Performed by: NURSE PRACTITIONER

## 2018-03-19 PROCEDURE — G8427 DOCREV CUR MEDS BY ELIG CLIN: HCPCS | Performed by: NURSE PRACTITIONER

## 2018-03-19 PROCEDURE — 71046 X-RAY EXAM CHEST 2 VIEWS: CPT

## 2018-03-19 PROCEDURE — 36415 COLL VENOUS BLD VENIPUNCTURE: CPT

## 2018-03-19 PROCEDURE — G8417 CALC BMI ABV UP PARAM F/U: HCPCS | Performed by: NURSE PRACTITIONER

## 2018-03-19 PROCEDURE — 85027 COMPLETE CBC AUTOMATED: CPT

## 2018-03-19 PROCEDURE — G8400 PT W/DXA NO RESULTS DOC: HCPCS | Performed by: NURSE PRACTITIONER

## 2018-03-19 RX ORDER — LORAZEPAM 0.5 MG/1
TABLET ORAL
Qty: 60 TABLET | Refills: 0 | Status: CANCELLED | OUTPATIENT
Start: 2018-03-19

## 2018-03-19 RX ORDER — OMEPRAZOLE 20 MG/1
20 CAPSULE, DELAYED RELEASE ORAL DAILY
Qty: 90 CAPSULE | Refills: 3 | Status: SHIPPED | OUTPATIENT
Start: 2018-03-19 | End: 2019-05-28 | Stop reason: ALTCHOICE

## 2018-03-19 RX ORDER — GUAIFENESIN AND CODEINE PHOSPHATE 100; 10 MG/5ML; MG/5ML
5 SOLUTION ORAL 3 TIMES DAILY PRN
COMMUNITY
End: 2018-04-17 | Stop reason: ALTCHOICE

## 2018-03-19 RX ORDER — LORAZEPAM 0.5 MG/1
TABLET ORAL
Qty: 60 TABLET | Refills: 2 | Status: SHIPPED | OUTPATIENT
Start: 2018-03-19 | End: 2018-06-22 | Stop reason: SDUPTHER

## 2018-03-19 RX ORDER — NITROFURANTOIN 25; 75 MG/1; MG/1
100 CAPSULE ORAL 2 TIMES DAILY
Qty: 14 CAPSULE | Refills: 0 | Status: SHIPPED | OUTPATIENT
Start: 2018-03-19 | End: 2018-03-26

## 2018-03-19 ASSESSMENT — PATIENT HEALTH QUESTIONNAIRE - PHQ9
SUM OF ALL RESPONSES TO PHQ QUESTIONS 1-9: 2
1. LITTLE INTEREST OR PLEASURE IN DOING THINGS: 1
SUM OF ALL RESPONSES TO PHQ9 QUESTIONS 1 & 2: 2
2. FEELING DOWN, DEPRESSED OR HOPELESS: 1

## 2018-03-19 ASSESSMENT — ENCOUNTER SYMPTOMS
ABDOMINAL PAIN: 0
SHORTNESS OF BREATH: 0
BACK PAIN: 0
COUGH: 1

## 2018-03-19 NOTE — PROGRESS NOTES
09/28/2014    Pneumococcal low/med risk (1 of 2 - PCV13) 09/28/2014    Diabetic foot exam  11/13/2018    Lipid screen  11/13/2018    Diabetic retinal exam  12/11/2018    Diabetic microalbuminuria test  02/13/2019    A1C test (Diabetic or Prediabetic)  02/28/2019    Potassium monitoring  03/02/2019    Creatinine monitoring  03/02/2019    Breast cancer screen  08/15/2019    Flu vaccine  Completed       Subjective:      Review of Systems   Constitutional: Positive for fatigue. Negative for chills and fever. HENT: Positive for congestion. Eyes: Negative for visual disturbance. Respiratory: Positive for cough. Negative for shortness of breath. Cardiovascular: Negative for chest pain and palpitations. Gastrointestinal: Negative for abdominal pain. Genitourinary: Positive for dysuria. Negative for urgency. Musculoskeletal: Positive for arthralgias and myalgias. Negative for back pain. Skin: Positive for pallor. Neurological: Positive for weakness (left sided). Negative for dizziness and numbness. Psychiatric/Behavioral: Negative for self-injury, sleep disturbance and suicidal ideas. The patient is nervous/anxious. Objective:     Physical Exam   Constitutional: She is oriented to person, place, and time. She appears well-developed and well-nourished. HENT:   Head: Normocephalic and atraumatic. Eyes: Pupils are equal, round, and reactive to light. Neck: Normal range of motion. Neck supple. Cardiovascular: Normal rate, regular rhythm and normal heart sounds. Pulmonary/Chest: Effort normal and breath sounds normal.   Abdominal: Soft. Bowel sounds are normal. There is no tenderness. Musculoskeletal: Normal range of motion. Neurological: She is alert and oriented to person, place, and time. Skin: Skin is warm and dry. Psychiatric: She has a normal mood and affect. Her behavior is normal. Judgment and thought content normal.   Nursing note and vitals reviewed.     /72 Pulse 104   Resp 18   Ht 5' 3\" (1.6 m)   Wt 163 lb 3.2 oz (74 kg)   SpO2 95%   BMI 28.91 kg/m²     Assessment:      1. Iron deficiency anemia, unspecified iron deficiency anemia type  CBC    Iron And TIBC   2. Urinary tract infection without hematuria, site unspecified  POCT Urinalysis No Micro (Auto)   3. Anxiety  LORazepam (ATIVAN) 0.5 MG tablet   4. Gastroesophageal reflux disease without esophagitis  omeprazole (PRILOSEC) 20 MG delayed release capsule   5. Cough  XR CHEST STANDARD (2 VW)   6. Muscle weakness     7. Essential (primary) hypertension         Plan:      Return in about 3 months (around 6/19/2018) for recheck. 1. Anemia- Rx given for repeat CBC and iron levels. Follow up pending labs. 2. UTI- Repeat UA. Positive for trace leukocytes and ketones. Encouraged fluids. Will send for culture. Resume macrobid. Follow up in 2 weeks for recheck. 3. Anxiety- Stable. Continue meds at current dose. Follow up in 3 months for recheck. 4. GERD- Stable. Renewed prilosec. Follow up in 3 months for recheck. 5. Muscle weakness- Rx given for referral to PT, patient declines. Continue to follow with Dr. Victoriano Lombard. States that she will do her exercises at home. Follow up in two weeks for recheck. 6. HTN- Stable. Not currently taking meds. Continue to monitor BP at home. Follow up in 3 months for recheck. Of the 40 minute duration appointment visit, Mariam Iverson [facility] spent at least 50% of the face-to-face time in counseling, explanation of diagnosis, planning of further management, and answering all questions.     Orders Placed This Encounter   Procedures    XR CHEST STANDARD (2 VW)     Standing Status:   Future     Standing Expiration Date:   3/19/2019     Order Specific Question:   Reason for exam:     Answer:   cough    CBC     Standing Status:   Future     Standing Expiration Date:   3/19/2019    Iron And TIBC     Standing Status:   Future     Standing Expiration Date:   3/19/2019 Order Specific Question:   Is Patient Fasting? Answer:   no     Order Specific Question:   No of Hours? Answer:   na    POCT Urinalysis No Micro (Auto)     Orders Placed This Encounter   Medications    LORazepam (ATIVAN) 0.5 MG tablet     Sig: TAKE ONE TABLET BY MOUTH EVERY 12 HOURS AS NEEDED FOR ANXIETY. Dispense:  60 tablet     Refill:  2    omeprazole (PRILOSEC) 20 MG delayed release capsule     Sig: Take 1 capsule by mouth Daily     Dispense:  90 capsule     Refill:  3       Patient given educational materials - see patient instructions. Discussed use, benefit, and side effects of prescribed medications. All patient questions answered. Pt voiced understanding. Reviewed health maintenance. Instructed to continue current medications, diet and exercise. Patient agreed with treatment plan. Follow up as directed.       Electronically signed by Mayur Valencia CNP on 3/19/2018 at 9:47 AM

## 2018-03-20 ENCOUNTER — OFFICE VISIT (OUTPATIENT)
Dept: PODIATRY | Age: 68
End: 2018-03-20
Payer: MEDICARE

## 2018-03-20 ENCOUNTER — HOSPITAL ENCOUNTER (OUTPATIENT)
Dept: CT IMAGING | Age: 68
Discharge: HOME | End: 2018-03-22
Payer: MEDICARE

## 2018-03-20 VITALS
DIASTOLIC BLOOD PRESSURE: 75 MMHG | HEIGHT: 63 IN | SYSTOLIC BLOOD PRESSURE: 117 MMHG | BODY MASS INDEX: 28.88 KG/M2 | HEART RATE: 102 BPM | WEIGHT: 163 LBS

## 2018-03-20 DIAGNOSIS — L84 CORNS AND CALLOSITIES: ICD-10-CM

## 2018-03-20 DIAGNOSIS — M79.671 PAIN IN RIGHT FOOT: ICD-10-CM

## 2018-03-20 DIAGNOSIS — R93.89 ABNORMAL CHEST X-RAY: ICD-10-CM

## 2018-03-20 DIAGNOSIS — E11.43 TYPE 2 DIABETES MELLITUS WITH DIABETIC AUTONOMIC NEUROPATHY, WITHOUT LONG-TERM CURRENT USE OF INSULIN (HCC): ICD-10-CM

## 2018-03-20 DIAGNOSIS — M79.674 PAIN OF TOES OF BOTH FEET: ICD-10-CM

## 2018-03-20 DIAGNOSIS — B35.1 ONYCHOMYCOSIS OF TOENAIL: Primary | ICD-10-CM

## 2018-03-20 DIAGNOSIS — M79.675 PAIN OF TOES OF BOTH FEET: ICD-10-CM

## 2018-03-20 DIAGNOSIS — E11.42 DIABETIC POLYNEUROPATHY ASSOCIATED WITH TYPE 2 DIABETES MELLITUS (HCC): ICD-10-CM

## 2018-03-20 DIAGNOSIS — M79.672 PAIN IN LEFT FOOT: ICD-10-CM

## 2018-03-20 LAB
CULTURE: NORMAL
CULTURE: NORMAL
Lab: NORMAL
SPECIMEN DESCRIPTION: NORMAL
STATUS: NORMAL

## 2018-03-20 PROCEDURE — 11721 DEBRIDE NAIL 6 OR MORE: CPT | Performed by: PODIATRIST

## 2018-03-20 PROCEDURE — 71250 CT THORAX DX C-: CPT

## 2018-03-20 PROCEDURE — 3044F HG A1C LEVEL LT 7.0%: CPT | Performed by: PODIATRIST

## 2018-03-20 PROCEDURE — 1111F DSCHRG MED/CURRENT MED MERGE: CPT | Performed by: PODIATRIST

## 2018-03-20 PROCEDURE — G8427 DOCREV CUR MEDS BY ELIG CLIN: HCPCS | Performed by: PODIATRIST

## 2018-03-20 PROCEDURE — 3017F COLORECTAL CA SCREEN DOC REV: CPT | Performed by: PODIATRIST

## 2018-03-20 PROCEDURE — G8400 PT W/DXA NO RESULTS DOC: HCPCS | Performed by: PODIATRIST

## 2018-03-20 PROCEDURE — 1090F PRES/ABSN URINE INCON ASSESS: CPT | Performed by: PODIATRIST

## 2018-03-20 PROCEDURE — 1123F ACP DISCUSS/DSCN MKR DOCD: CPT | Performed by: PODIATRIST

## 2018-03-20 PROCEDURE — 1036F TOBACCO NON-USER: CPT | Performed by: PODIATRIST

## 2018-03-20 PROCEDURE — G8417 CALC BMI ABV UP PARAM F/U: HCPCS | Performed by: PODIATRIST

## 2018-03-20 PROCEDURE — 3014F SCREEN MAMMO DOC REV: CPT | Performed by: PODIATRIST

## 2018-03-20 PROCEDURE — G8482 FLU IMMUNIZE ORDER/ADMIN: HCPCS | Performed by: PODIATRIST

## 2018-03-20 PROCEDURE — 11056 PARNG/CUTG B9 HYPRKR LES 2-4: CPT | Performed by: PODIATRIST

## 2018-03-20 PROCEDURE — 99214 OFFICE O/P EST MOD 30 MIN: CPT | Performed by: PODIATRIST

## 2018-03-20 PROCEDURE — 4040F PNEUMOC VAC/ADMIN/RCVD: CPT | Performed by: PODIATRIST

## 2018-03-20 RX ORDER — GABAPENTIN 300 MG/1
300 CAPSULE ORAL 3 TIMES DAILY
Qty: 90 CAPSULE | Refills: 3 | Status: SHIPPED | OUTPATIENT
Start: 2018-03-20 | End: 2018-04-17 | Stop reason: ALTCHOICE

## 2018-03-20 ASSESSMENT — ENCOUNTER SYMPTOMS
COLOR CHANGE: 0
BACK PAIN: 0
DIARRHEA: 0
SHORTNESS OF BREATH: 0
NAUSEA: 0

## 2018-03-20 NOTE — PROGRESS NOTES
(COUMADIN) 4 MG tablet Take 4 mg by mouth Twice a Week    Yes Historical Provider, MD   metFORMIN (GLUCOPHAGE-XR) 500 MG extended release tablet Take 1 tablet by mouth 2 times daily (before meals) 3/1/18  Yes Florina Ashraf CNP   diclofenac sodium 1 % GEL Apply 2 g topically 4 times daily as needed for Pain (Local application) 3/90/01  Yes Manjula Thomson MD   ferrous sulfate 325 (65 Fe) MG tablet Take 1 tablet by mouth daily (with breakfast) 2/20/18  Yes Manjula Thomson MD   acetaminophen (TYLENOL ARTHRITIS PAIN) 650 MG extended release tablet Take 1,300 mg by mouth 2 times daily   Yes Historical Provider, MD   docusate sodium (COLACE) 100 MG capsule Take 1 capsule by mouth 2 times daily 2/12/18  Yes Florinastevenson Ashraf, KIKO   rOPINIRole (REQUIP) 0.5 MG tablet Take 1 tablet by mouth 3 times daily  Patient taking differently: Take 0.5 mg by mouth every evening  2/1/18  Yes Florinastevenson Ashraf, KIKO   atorvastatin (LIPITOR) 20 MG tablet Take 1 tablet by mouth daily 8/7/17  Yes Florina Pascale, KIKO   Accu-Chek Softclix Lancets MISC Use 2 TIMES DAILY. Dx: E11.22, N18.3. Type II DM 6/22/17  Yes Magdaleno Nagy MD   Glucose Blood (BLOOD GLUCOSE TEST STRIPS) STRP Test_2__times daily    Diagnosis: 250.0   Diabetes Mellitus______Non-Insulin Dependent 6/21/17  Yes Magdaleno Nagy MD   Alcohol Swabs PADS Need to use  twice a day 6/21/17  Yes Magdaleno Nagy MD   Blood Glucose Monitoring Suppl FLAVIA Glucometer Accu check. Test Blood Sugars 2 Times Daily.  Dx: E 11.22, N 18.3 type II DM 6/21/17  Yes Magdaleno Nagy MD       Past Surgical History:   Procedure Laterality Date    BREAST SURGERY      reduction    COLONOSCOPY      FOOT SURGERY      JOINT REPLACEMENT Bilateral     hip    OTHER SURGICAL HISTORY      vocal cord surgeryi       Family History   Problem Relation Age of Onset    Kidney Disease Mother     Cancer Father     Arthritis Brother        Social History   Substance Use Topics    Smoking status: Never Smoker    Smokeless tobacco: Never Used    Alcohol use No       Review of Systems   Constitutional: Negative for activity change, appetite change, chills, diaphoresis, fatigue and fever. Respiratory: Negative for shortness of breath. Cardiovascular: Negative for leg swelling. Gastrointestinal: Negative for diarrhea and nausea. Endocrine: Negative for cold intolerance, heat intolerance and polyuria. Musculoskeletal: Positive for arthralgias. Negative for back pain, gait problem, joint swelling and myalgias. Skin: Negative for color change, pallor, rash and wound. Allergic/Immunologic: Negative for environmental allergies and food allergies. Neurological: Positive for numbness. Negative for dizziness, weakness and light-headedness. Hematological: Does not bruise/bleed easily. Psychiatric/Behavioral: Negative for behavioral problems, confusion and self-injury. The patient is not nervous/anxious. Vitals:   Vitals:    03/20/18 1106   BP: 117/75   Pulse: 102       General: AAO x 3 in NAD. Integument: There are no rashes, ulcers, or breaks in the skin noted to the bilateral lower extremities. There is no induration, subcutaneous nodules, or tightening of the skin noted to the bilateral.     Toenails 1-5 of the right foot do present with thickness, elongation, discoloration, brittleness, subungual debris. Toenails 1-5 of the left foot do present with thickness, elongation, discoloration, brittleness, subungual debris. There is pain with palpation and debridement of toenails 1-5 of the right foot and 1-5 of the left foot. Interdigital maceration absent to web spaces 1-4, Bilateral.     There are preulcerative lesions noted to the right foot submetatarsal head five. There is pain with palpation to the lesion. Debridement of the lesion with a fifteen blade reveals a central core. This core was also debrided with a fifteen blade.     There are preulcerative gabapentin with instructions on proper upwards titration. Patient advised to inform her PCP that she is taking this medication for future management. Patient educated on proper diabetic foot care. 3. Contact office with any questions/problems/concerns. Return in about 9 weeks (around 5/22/2018) for At risk diabetic foot care.    3/20/2018      Shruthi Cummings DPM

## 2018-03-21 DIAGNOSIS — K44.9 HIATAL HERNIA: Primary | ICD-10-CM

## 2018-03-26 ENCOUNTER — HOSPITAL ENCOUNTER (OUTPATIENT)
Dept: PHARMACY | Age: 68
Setting detail: THERAPIES SERIES
Discharge: HOME | End: 2018-03-26
Payer: MEDICARE

## 2018-03-26 DIAGNOSIS — I26.99 OTHER PULMONARY EMBOLISM WITHOUT ACUTE COR PULMONALE, UNSPECIFIED CHRONICITY (HCC): ICD-10-CM

## 2018-03-26 DIAGNOSIS — I82.439 DEEP VEIN THROMBOSIS (DVT) OF POPLITEAL VEIN, UNSPECIFIED CHRONICITY, UNSPECIFIED LATERALITY (HCC): ICD-10-CM

## 2018-03-26 LAB
INR BLD: 2.7
PROTIME: 32.1 SECONDS

## 2018-03-26 PROCEDURE — 85610 PROTHROMBIN TIME: CPT

## 2018-03-26 PROCEDURE — 99211 OFF/OP EST MAY X REQ PHY/QHP: CPT

## 2018-03-27 ENCOUNTER — CARE COORDINATION (OUTPATIENT)
Dept: CARE COORDINATION | Age: 68
End: 2018-03-27

## 2018-03-27 NOTE — CARE COORDINATION
1,300 mg by mouth 2 times daily    Historical Provider, MD   docusate sodium (COLACE) 100 MG capsule Take 1 capsule by mouth 2 times daily 2/12/18   Simon Valencia CNP   rOPINIRole (REQUIP) 0.5 MG tablet Take 1 tablet by mouth 3 times daily  Patient taking differently: Take 0.5 mg by mouth every evening  2/1/18   Simon Valencia CNP   atorvastatin (LIPITOR) 20 MG tablet Take 1 tablet by mouth daily 8/7/17   Simon Valencia CNP   Accu-Chek Softclix Lancets MISC Use 2 TIMES DAILY. Dx: E11.22, N18.3. Type II DM 6/22/17   Deann Sampson MD   Glucose Blood (BLOOD GLUCOSE TEST STRIPS) STRP Test_2__times daily    Diagnosis: 250.0   Diabetes Mellitus______Non-Insulin Dependent 6/21/17   Deann Sampson MD   Alcohol Swabs PADS Need to use  twice a day 6/21/17   Deann Sampson MD   Blood Glucose Monitoring Suppl FLAVIA Glucometer Accu check. Test Blood Sugars 2 Times Daily.  Dx: E 11.22, N 18.3 type II DM 6/21/17   Deann Sampson MD       Future Appointments  Date Time Provider Rachael Garsia   [facility]2018 [facility]   4/2/2018 8:40 AM Simon Valencia CNP [facility]   4/2/2018 11:30 AM [facility]ICATION [facility]MT [facility]   6/4/2018 11:00 AM Juli Solomon DPM [facility]   9/24/2018 10:10 AM Lucius Jj MD [facility] None

## 2018-04-02 ENCOUNTER — OFFICE VISIT (OUTPATIENT)
Dept: PRIMARY CARE CLINIC | Age: 68
End: 2018-04-02
Payer: MEDICARE

## 2018-04-02 ENCOUNTER — HOSPITAL ENCOUNTER (OUTPATIENT)
Dept: PHARMACY | Age: 68
Setting detail: THERAPIES SERIES
Discharge: HOME | End: 2018-04-02
Payer: MEDICARE

## 2018-04-02 VITALS
HEART RATE: 82 BPM | WEIGHT: 164.6 LBS | BODY MASS INDEX: 29.16 KG/M2 | SYSTOLIC BLOOD PRESSURE: 138 MMHG | HEIGHT: 63 IN | DIASTOLIC BLOOD PRESSURE: 74 MMHG | RESPIRATION RATE: 17 BRPM | OXYGEN SATURATION: 95 %

## 2018-04-02 DIAGNOSIS — D64.9 ANEMIA, UNSPECIFIED TYPE: Primary | ICD-10-CM

## 2018-04-02 DIAGNOSIS — N39.0 URINARY TRACT INFECTION WITHOUT HEMATURIA, SITE UNSPECIFIED: ICD-10-CM

## 2018-04-02 LAB
INR BLD: 3.3
PROTIME: 39.1 SECONDS

## 2018-04-02 PROCEDURE — G8417 CALC BMI ABV UP PARAM F/U: HCPCS | Performed by: NURSE PRACTITIONER

## 2018-04-02 PROCEDURE — 1090F PRES/ABSN URINE INCON ASSESS: CPT | Performed by: NURSE PRACTITIONER

## 2018-04-02 PROCEDURE — 1036F TOBACCO NON-USER: CPT | Performed by: NURSE PRACTITIONER

## 2018-04-02 PROCEDURE — 99211 OFF/OP EST MAY X REQ PHY/QHP: CPT

## 2018-04-02 PROCEDURE — 3014F SCREEN MAMMO DOC REV: CPT | Performed by: NURSE PRACTITIONER

## 2018-04-02 PROCEDURE — 3017F COLORECTAL CA SCREEN DOC REV: CPT | Performed by: NURSE PRACTITIONER

## 2018-04-02 PROCEDURE — 99214 OFFICE O/P EST MOD 30 MIN: CPT | Performed by: NURSE PRACTITIONER

## 2018-04-02 PROCEDURE — 1123F ACP DISCUSS/DSCN MKR DOCD: CPT | Performed by: NURSE PRACTITIONER

## 2018-04-02 PROCEDURE — 85610 PROTHROMBIN TIME: CPT

## 2018-04-02 PROCEDURE — G8427 DOCREV CUR MEDS BY ELIG CLIN: HCPCS | Performed by: NURSE PRACTITIONER

## 2018-04-02 PROCEDURE — 4040F PNEUMOC VAC/ADMIN/RCVD: CPT | Performed by: NURSE PRACTITIONER

## 2018-04-02 PROCEDURE — G8400 PT W/DXA NO RESULTS DOC: HCPCS | Performed by: NURSE PRACTITIONER

## 2018-04-02 RX ORDER — RANITIDINE 150 MG/1
150 TABLET ORAL 2 TIMES DAILY
COMMUNITY
End: 2018-04-17 | Stop reason: ALTCHOICE

## 2018-04-02 ASSESSMENT — ENCOUNTER SYMPTOMS
COUGH: 0
BACK PAIN: 0
ABDOMINAL PAIN: 0
SHORTNESS OF BREATH: 0

## 2018-04-06 ENCOUNTER — TELEPHONE (OUTPATIENT)
Dept: PRIMARY CARE CLINIC | Age: 68
End: 2018-04-06

## 2018-04-09 ENCOUNTER — HOSPITAL ENCOUNTER (OUTPATIENT)
Dept: PHARMACY | Age: 68
Setting detail: THERAPIES SERIES
Discharge: HOME | End: 2018-04-09
Payer: MEDICARE

## 2018-04-09 ENCOUNTER — TELEPHONE (OUTPATIENT)
Dept: PRIMARY CARE CLINIC | Age: 68
End: 2018-04-09

## 2018-04-09 LAB
INR BLD: 2.9
PROTIME: 35.2 SECONDS

## 2018-04-09 PROCEDURE — 85610 PROTHROMBIN TIME: CPT

## 2018-04-09 PROCEDURE — 99211 OFF/OP EST MAY X REQ PHY/QHP: CPT

## 2018-04-09 RX ORDER — FUROSEMIDE 20 MG/1
20 TABLET ORAL DAILY
Qty: 7 TABLET | Refills: 0 | Status: SHIPPED | OUTPATIENT
Start: 2018-04-09 | End: 2018-04-17 | Stop reason: SDUPTHER

## 2018-04-11 PROBLEM — N39.0 UTI (URINARY TRACT INFECTION): Status: RESOLVED | Noted: 2018-02-17 | Resolved: 2018-04-11

## 2018-04-13 ENCOUNTER — TELEPHONE (OUTPATIENT)
Dept: PULMONOLOGY | Age: 68
End: 2018-04-13

## 2018-04-13 ENCOUNTER — INITIAL CONSULT (OUTPATIENT)
Dept: BARIATRICS/WEIGHT MGMT | Age: 68
End: 2018-04-13
Payer: MEDICARE

## 2018-04-13 VITALS
RESPIRATION RATE: 20 BRPM | HEART RATE: 66 BPM | HEIGHT: 65 IN | WEIGHT: 164.5 LBS | BODY MASS INDEX: 27.41 KG/M2 | DIASTOLIC BLOOD PRESSURE: 66 MMHG | SYSTOLIC BLOOD PRESSURE: 128 MMHG

## 2018-04-13 DIAGNOSIS — I10 ESSENTIAL (PRIMARY) HYPERTENSION: ICD-10-CM

## 2018-04-13 DIAGNOSIS — I27.82 OTHER CHRONIC PULMONARY EMBOLISM WITHOUT ACUTE COR PULMONALE (HCC): ICD-10-CM

## 2018-04-13 DIAGNOSIS — K21.9 HIATAL HERNIA WITH GERD: Primary | ICD-10-CM

## 2018-04-13 DIAGNOSIS — K44.9 HIATAL HERNIA WITH GERD: Primary | ICD-10-CM

## 2018-04-13 PROCEDURE — 3017F COLORECTAL CA SCREEN DOC REV: CPT | Performed by: SURGERY

## 2018-04-13 PROCEDURE — G8427 DOCREV CUR MEDS BY ELIG CLIN: HCPCS | Performed by: SURGERY

## 2018-04-13 PROCEDURE — G8400 PT W/DXA NO RESULTS DOC: HCPCS | Performed by: SURGERY

## 2018-04-13 PROCEDURE — 1123F ACP DISCUSS/DSCN MKR DOCD: CPT | Performed by: SURGERY

## 2018-04-13 PROCEDURE — 4040F PNEUMOC VAC/ADMIN/RCVD: CPT | Performed by: SURGERY

## 2018-04-13 PROCEDURE — 1036F TOBACCO NON-USER: CPT | Performed by: SURGERY

## 2018-04-13 PROCEDURE — G8417 CALC BMI ABV UP PARAM F/U: HCPCS | Performed by: SURGERY

## 2018-04-13 PROCEDURE — 99204 OFFICE O/P NEW MOD 45 MIN: CPT | Performed by: SURGERY

## 2018-04-13 PROCEDURE — 3014F SCREEN MAMMO DOC REV: CPT | Performed by: SURGERY

## 2018-04-13 PROCEDURE — 1090F PRES/ABSN URINE INCON ASSESS: CPT | Performed by: SURGERY

## 2018-04-16 ENCOUNTER — HOSPITAL ENCOUNTER (OUTPATIENT)
Age: 68
Discharge: HOME | End: 2018-04-16
Payer: MEDICARE

## 2018-04-16 ENCOUNTER — OFFICE VISIT (OUTPATIENT)
Dept: PULMONOLOGY | Age: 68
End: 2018-04-16
Payer: MEDICARE

## 2018-04-16 ENCOUNTER — TELEPHONE (OUTPATIENT)
Dept: PRIMARY CARE CLINIC | Age: 68
End: 2018-04-16

## 2018-04-16 VITALS — OXYGEN SATURATION: 98 % | BODY MASS INDEX: 30.18 KG/M2 | WEIGHT: 164 LBS | HEART RATE: 87 BPM | HEIGHT: 62 IN

## 2018-04-16 VITALS
SYSTOLIC BLOOD PRESSURE: 134 MMHG | DIASTOLIC BLOOD PRESSURE: 84 MMHG | OXYGEN SATURATION: 98 % | TEMPERATURE: 97.2 F | RESPIRATION RATE: 16 BRPM | WEIGHT: 160 LBS | HEIGHT: 63 IN | HEART RATE: 87 BPM | BODY MASS INDEX: 28.35 KG/M2

## 2018-04-16 DIAGNOSIS — I26.02 SADDLE EMBOLUS OF PULMONARY ARTERY WITH ACUTE COR PULMONALE, UNSPECIFIED CHRONICITY (HCC): ICD-10-CM

## 2018-04-16 DIAGNOSIS — Q79.1 DIAPHRAGM, EVENTRATION: ICD-10-CM

## 2018-04-16 DIAGNOSIS — N39.0 URINARY TRACT INFECTION WITHOUT HEMATURIA, SITE UNSPECIFIED: ICD-10-CM

## 2018-04-16 DIAGNOSIS — D64.9 ANEMIA, UNSPECIFIED TYPE: ICD-10-CM

## 2018-04-16 DIAGNOSIS — K21.9 HIATAL HERNIA WITH GASTROESOPHAGEAL REFLUX: ICD-10-CM

## 2018-04-16 DIAGNOSIS — R06.02 SHORTNESS OF BREATH: ICD-10-CM

## 2018-04-16 DIAGNOSIS — K44.9 HIATAL HERNIA WITH GASTROESOPHAGEAL REFLUX: ICD-10-CM

## 2018-04-16 DIAGNOSIS — J38.02 BILATERAL VOCAL CORD PARESIS: Primary | ICD-10-CM

## 2018-04-16 DIAGNOSIS — E11.9 TYPE 2 DIABETES MELLITUS WITHOUT COMPLICATION, WITHOUT LONG-TERM CURRENT USE OF INSULIN (HCC): ICD-10-CM

## 2018-04-16 LAB
-: ABNORMAL
AMORPHOUS: ABNORMAL
BACTERIA: ABNORMAL
BILIRUBIN URINE: ABNORMAL
CASTS UA: ABNORMAL /LPF
COLOR: YELLOW
COMMENT UA: ABNORMAL
CRYSTALS, UA: ABNORMAL /HPF
EPITHELIAL CELLS UA: ABNORMAL /HPF
GLUCOSE URINE: NEGATIVE
HCT VFR BLD CALC: 38.5 % (ref 36–46)
HEMOGLOBIN: 12.6 G/DL (ref 12–16)
IRON SATURATION: 22 % (ref 20–55)
IRON: 69 UG/DL (ref 37–145)
KETONES, URINE: NEGATIVE
LEUKOCYTE ESTERASE, URINE: ABNORMAL
MCH RBC QN AUTO: 26.5 PG (ref 26–34)
MCHC RBC AUTO-ENTMCNC: 32.6 G/DL (ref 31–37)
MCV RBC AUTO: 81.2 FL (ref 80–100)
MUCUS: ABNORMAL
NITRITE, URINE: NEGATIVE
NRBC AUTOMATED: ABNORMAL PER 100 WBC
OTHER OBSERVATIONS UA: ABNORMAL
PDW BLD-RTO: 21.5 % (ref 11.5–14.9)
PH UA: 6 (ref 5–8)
PLATELET # BLD: 439 K/UL (ref 150–450)
PMV BLD AUTO: 8.7 FL (ref 6–12)
PROTEIN UA: ABNORMAL
RBC # BLD: 4.75 M/UL (ref 4–5.2)
RBC UA: ABNORMAL /HPF
RENAL EPITHELIAL, UA: ABNORMAL /HPF
SPECIFIC GRAVITY UA: 1.03 (ref 1–1.03)
TOTAL IRON BINDING CAPACITY: 320 UG/DL (ref 250–450)
TRICHOMONAS: ABNORMAL
TURBIDITY: CLEAR
UNSATURATED IRON BINDING CAPACITY: 251 UG/DL (ref 112–347)
URINE HGB: NEGATIVE
UROBILINOGEN, URINE: NORMAL
WBC # BLD: 10.9 K/UL (ref 3.5–11)
WBC UA: ABNORMAL /HPF
YEAST: ABNORMAL

## 2018-04-16 PROCEDURE — 83540 ASSAY OF IRON: CPT

## 2018-04-16 PROCEDURE — 36415 COLL VENOUS BLD VENIPUNCTURE: CPT

## 2018-04-16 PROCEDURE — G8417 CALC BMI ABV UP PARAM F/U: HCPCS | Performed by: INTERNAL MEDICINE

## 2018-04-16 PROCEDURE — 1123F ACP DISCUSS/DSCN MKR DOCD: CPT | Performed by: INTERNAL MEDICINE

## 2018-04-16 PROCEDURE — 1090F PRES/ABSN URINE INCON ASSESS: CPT | Performed by: INTERNAL MEDICINE

## 2018-04-16 PROCEDURE — G8400 PT W/DXA NO RESULTS DOC: HCPCS | Performed by: INTERNAL MEDICINE

## 2018-04-16 PROCEDURE — 2022F DILAT RTA XM EVC RTNOPTHY: CPT | Performed by: INTERNAL MEDICINE

## 2018-04-16 PROCEDURE — 94729 DIFFUSING CAPACITY: CPT | Performed by: INTERNAL MEDICINE

## 2018-04-16 PROCEDURE — G8427 DOCREV CUR MEDS BY ELIG CLIN: HCPCS | Performed by: INTERNAL MEDICINE

## 2018-04-16 PROCEDURE — 94726 PLETHYSMOGRAPHY LUNG VOLUMES: CPT | Performed by: INTERNAL MEDICINE

## 2018-04-16 PROCEDURE — 3014F SCREEN MAMMO DOC REV: CPT | Performed by: INTERNAL MEDICINE

## 2018-04-16 PROCEDURE — 3017F COLORECTAL CA SCREEN DOC REV: CPT | Performed by: INTERNAL MEDICINE

## 2018-04-16 PROCEDURE — 85027 COMPLETE CBC AUTOMATED: CPT

## 2018-04-16 PROCEDURE — 3044F HG A1C LEVEL LT 7.0%: CPT | Performed by: INTERNAL MEDICINE

## 2018-04-16 PROCEDURE — 83550 IRON BINDING TEST: CPT

## 2018-04-16 PROCEDURE — 94060 EVALUATION OF WHEEZING: CPT | Performed by: INTERNAL MEDICINE

## 2018-04-16 PROCEDURE — 99203 OFFICE O/P NEW LOW 30 MIN: CPT | Performed by: INTERNAL MEDICINE

## 2018-04-16 PROCEDURE — 1036F TOBACCO NON-USER: CPT | Performed by: INTERNAL MEDICINE

## 2018-04-16 PROCEDURE — 81001 URINALYSIS AUTO W/SCOPE: CPT

## 2018-04-16 PROCEDURE — 4040F PNEUMOC VAC/ADMIN/RCVD: CPT | Performed by: INTERNAL MEDICINE

## 2018-04-17 ENCOUNTER — HOSPITAL ENCOUNTER (OUTPATIENT)
Age: 68
Setting detail: SPECIMEN
Discharge: HOME | End: 2018-04-17
Payer: MEDICARE

## 2018-04-17 ENCOUNTER — TELEPHONE (OUTPATIENT)
Dept: PRIMARY CARE CLINIC | Age: 68
End: 2018-04-17

## 2018-04-17 ENCOUNTER — OFFICE VISIT (OUTPATIENT)
Dept: PRIMARY CARE CLINIC | Age: 68
End: 2018-04-17
Payer: MEDICARE

## 2018-04-17 ENCOUNTER — CARE COORDINATION (OUTPATIENT)
Dept: CARE COORDINATION | Age: 68
End: 2018-04-17

## 2018-04-17 VITALS
BODY MASS INDEX: 30.33 KG/M2 | SYSTOLIC BLOOD PRESSURE: 140 MMHG | DIASTOLIC BLOOD PRESSURE: 78 MMHG | HEIGHT: 62 IN | HEART RATE: 84 BPM | WEIGHT: 164.8 LBS | OXYGEN SATURATION: 97 % | RESPIRATION RATE: 18 BRPM

## 2018-04-17 DIAGNOSIS — N17.9 AKI (ACUTE KIDNEY INJURY) (HCC): ICD-10-CM

## 2018-04-17 DIAGNOSIS — R60.9 SWELLING: Primary | ICD-10-CM

## 2018-04-17 DIAGNOSIS — R30.0 DYSURIA: ICD-10-CM

## 2018-04-17 LAB
APPEARANCE FLUID: CLEAR
BILIRUBIN URINE: NEGATIVE
BILIRUBIN, POC: NORMAL
BLOOD URINE, POC: NORMAL
CLARITY, POC: CLEAR
COLOR, POC: YELLOW
COLOR: YELLOW
COMMENT UA: NORMAL
GLUCOSE URINE, POC: NORMAL
GLUCOSE URINE: NEGATIVE
KETONES, POC: NORMAL
KETONES, URINE: NEGATIVE
LEUKOCYTE EST, POC: NORMAL
LEUKOCYTE ESTERASE, URINE: NEGATIVE
NITRITE, POC: NORMAL
NITRITE, URINE: NEGATIVE
PH UA: 6 (ref 5–8)
PH, POC: 6.5
PROTEIN UA: NEGATIVE
PROTEIN, POC: NORMAL
SPECIFIC GRAVITY UA: 1.02 (ref 1–1.03)
SPECIFIC GRAVITY, POC: 1.01
TURBIDITY: CLEAR
URINE HGB: NEGATIVE
UROBILINOGEN, POC: 0.2
UROBILINOGEN, URINE: NORMAL

## 2018-04-17 PROCEDURE — 1036F TOBACCO NON-USER: CPT | Performed by: NURSE PRACTITIONER

## 2018-04-17 PROCEDURE — 1090F PRES/ABSN URINE INCON ASSESS: CPT | Performed by: NURSE PRACTITIONER

## 2018-04-17 PROCEDURE — G8400 PT W/DXA NO RESULTS DOC: HCPCS | Performed by: NURSE PRACTITIONER

## 2018-04-17 PROCEDURE — G8417 CALC BMI ABV UP PARAM F/U: HCPCS | Performed by: NURSE PRACTITIONER

## 2018-04-17 PROCEDURE — G8427 DOCREV CUR MEDS BY ELIG CLIN: HCPCS | Performed by: NURSE PRACTITIONER

## 2018-04-17 PROCEDURE — 1123F ACP DISCUSS/DSCN MKR DOCD: CPT | Performed by: NURSE PRACTITIONER

## 2018-04-17 PROCEDURE — 3017F COLORECTAL CA SCREEN DOC REV: CPT | Performed by: NURSE PRACTITIONER

## 2018-04-17 PROCEDURE — 3014F SCREEN MAMMO DOC REV: CPT | Performed by: NURSE PRACTITIONER

## 2018-04-17 PROCEDURE — 81003 URINALYSIS AUTO W/O SCOPE: CPT | Performed by: NURSE PRACTITIONER

## 2018-04-17 PROCEDURE — 99214 OFFICE O/P EST MOD 30 MIN: CPT | Performed by: NURSE PRACTITIONER

## 2018-04-17 PROCEDURE — 4040F PNEUMOC VAC/ADMIN/RCVD: CPT | Performed by: NURSE PRACTITIONER

## 2018-04-17 ASSESSMENT — ENCOUNTER SYMPTOMS
SORE THROAT: 0
SHORTNESS OF BREATH: 0
NAUSEA: 0
TROUBLE SWALLOWING: 0
SINUS PRESSURE: 0
COUGH: 0
WHEEZING: 0
ABDOMINAL PAIN: 0
BLOOD IN STOOL: 0
DIARRHEA: 0
CONSTIPATION: 0
VOMITING: 0

## 2018-04-18 ASSESSMENT — ENCOUNTER SYMPTOMS
SHORTNESS OF BREATH: 1
ALLERGIC/IMMUNOLOGIC NEGATIVE: 1
VOICE CHANGE: 1
EYES NEGATIVE: 1
GASTROINTESTINAL NEGATIVE: 1

## 2018-04-19 ENCOUNTER — TELEPHONE (OUTPATIENT)
Dept: PRIMARY CARE CLINIC | Age: 68
End: 2018-04-19

## 2018-04-19 ENCOUNTER — TELEPHONE (OUTPATIENT)
Dept: BARIATRICS/WEIGHT MGMT | Age: 68
End: 2018-04-19

## 2018-04-23 ENCOUNTER — HOSPITAL ENCOUNTER (OUTPATIENT)
Dept: PHARMACY | Age: 68
Setting detail: THERAPIES SERIES
Discharge: HOME | End: 2018-04-23
Payer: MEDICARE

## 2018-04-23 ENCOUNTER — TELEPHONE (OUTPATIENT)
Dept: PRIMARY CARE CLINIC | Age: 68
End: 2018-04-23

## 2018-04-23 LAB
INR BLD: 3.2
PROTIME: 38 SECONDS

## 2018-04-23 PROCEDURE — 99211 OFF/OP EST MAY X REQ PHY/QHP: CPT

## 2018-04-23 PROCEDURE — 85610 PROTHROMBIN TIME: CPT

## 2018-04-26 ENCOUNTER — CARE COORDINATION (OUTPATIENT)
Dept: CARE COORDINATION | Age: 68
End: 2018-04-26

## 2018-04-30 ENCOUNTER — OFFICE VISIT (OUTPATIENT)
Dept: PRIMARY CARE CLINIC | Age: 68
End: 2018-04-30
Payer: MEDICARE

## 2018-04-30 VITALS
SYSTOLIC BLOOD PRESSURE: 136 MMHG | WEIGHT: 163.2 LBS | OXYGEN SATURATION: 97 % | RESPIRATION RATE: 16 BRPM | DIASTOLIC BLOOD PRESSURE: 74 MMHG | HEART RATE: 84 BPM | BODY MASS INDEX: 27.86 KG/M2 | HEIGHT: 64 IN

## 2018-04-30 DIAGNOSIS — Z11.59 ENCOUNTER FOR HEPATITIS C SCREENING TEST FOR LOW RISK PATIENT: ICD-10-CM

## 2018-04-30 DIAGNOSIS — F41.9 ANXIETY: ICD-10-CM

## 2018-04-30 DIAGNOSIS — R33.9 URINARY RETENTION: Primary | ICD-10-CM

## 2018-04-30 DIAGNOSIS — E11.65 TYPE 2 DIABETES MELLITUS WITH HYPERGLYCEMIA, WITHOUT LONG-TERM CURRENT USE OF INSULIN (HCC): ICD-10-CM

## 2018-04-30 DIAGNOSIS — D64.9 ANEMIA, UNSPECIFIED TYPE: ICD-10-CM

## 2018-04-30 PROCEDURE — 2022F DILAT RTA XM EVC RTNOPTHY: CPT | Performed by: NURSE PRACTITIONER

## 2018-04-30 PROCEDURE — 1123F ACP DISCUSS/DSCN MKR DOCD: CPT | Performed by: NURSE PRACTITIONER

## 2018-04-30 PROCEDURE — 3017F COLORECTAL CA SCREEN DOC REV: CPT | Performed by: NURSE PRACTITIONER

## 2018-04-30 PROCEDURE — G8417 CALC BMI ABV UP PARAM F/U: HCPCS | Performed by: NURSE PRACTITIONER

## 2018-04-30 PROCEDURE — 4040F PNEUMOC VAC/ADMIN/RCVD: CPT | Performed by: NURSE PRACTITIONER

## 2018-04-30 PROCEDURE — 1090F PRES/ABSN URINE INCON ASSESS: CPT | Performed by: NURSE PRACTITIONER

## 2018-04-30 PROCEDURE — G8427 DOCREV CUR MEDS BY ELIG CLIN: HCPCS | Performed by: NURSE PRACTITIONER

## 2018-04-30 PROCEDURE — 3044F HG A1C LEVEL LT 7.0%: CPT | Performed by: NURSE PRACTITIONER

## 2018-04-30 PROCEDURE — 1036F TOBACCO NON-USER: CPT | Performed by: NURSE PRACTITIONER

## 2018-04-30 PROCEDURE — 3014F SCREEN MAMMO DOC REV: CPT | Performed by: NURSE PRACTITIONER

## 2018-04-30 PROCEDURE — G8400 PT W/DXA NO RESULTS DOC: HCPCS | Performed by: NURSE PRACTITIONER

## 2018-04-30 PROCEDURE — 99214 OFFICE O/P EST MOD 30 MIN: CPT | Performed by: NURSE PRACTITIONER

## 2018-04-30 RX ORDER — LISINOPRIL AND HYDROCHLOROTHIAZIDE 25; 20 MG/1; MG/1
1 TABLET ORAL DAILY
Qty: 90 TABLET | Refills: 3
Start: 2018-04-30 | End: 2018-10-15 | Stop reason: SDUPTHER

## 2018-04-30 RX ORDER — LISINOPRIL 20 MG/1
20 TABLET ORAL DAILY
COMMUNITY
End: 2018-04-30 | Stop reason: ALTCHOICE

## 2018-04-30 ASSESSMENT — ENCOUNTER SYMPTOMS
COUGH: 0
ABDOMINAL PAIN: 0
SHORTNESS OF BREATH: 0
BACK PAIN: 0

## 2018-05-04 ENCOUNTER — TELEPHONE (OUTPATIENT)
Dept: PRIMARY CARE CLINIC | Age: 68
End: 2018-05-04

## 2018-05-04 RX ORDER — CELECOXIB 200 MG/1
200 CAPSULE ORAL DAILY
Qty: 60 CAPSULE | Refills: 3 | Status: ON HOLD | OUTPATIENT
Start: 2018-05-04 | End: 2018-05-21 | Stop reason: CLARIF

## 2018-05-07 ENCOUNTER — HOSPITAL ENCOUNTER (OUTPATIENT)
Dept: PHARMACY | Age: 68
Setting detail: THERAPIES SERIES
Discharge: HOME | End: 2018-05-07
Payer: MEDICARE

## 2018-05-07 LAB
INR BLD: 2.7
PROTIME: 31.9 SECONDS

## 2018-05-07 PROCEDURE — 99211 OFF/OP EST MAY X REQ PHY/QHP: CPT

## 2018-05-07 PROCEDURE — 85610 PROTHROMBIN TIME: CPT

## 2018-05-08 ENCOUNTER — HOSPITAL ENCOUNTER (OUTPATIENT)
Dept: PREADMISSION TESTING | Age: 68
Discharge: HOME | End: 2018-05-12
Payer: MEDICARE

## 2018-05-08 VITALS
HEIGHT: 64 IN | WEIGHT: 166 LBS | OXYGEN SATURATION: 96 % | BODY MASS INDEX: 28.34 KG/M2 | RESPIRATION RATE: 16 BRPM | HEART RATE: 85 BPM | TEMPERATURE: 98.1 F | DIASTOLIC BLOOD PRESSURE: 75 MMHG | SYSTOLIC BLOOD PRESSURE: 128 MMHG

## 2018-05-08 LAB
ANION GAP SERPL CALCULATED.3IONS-SCNC: 15 MMOL/L (ref 9–17)
BUN BLDV-MCNC: 19 MG/DL (ref 8–23)
BUN/CREAT BLD: ABNORMAL (ref 9–20)
CALCIUM SERPL-MCNC: 9.5 MG/DL (ref 8.6–10.4)
CHLORIDE BLD-SCNC: 100 MMOL/L (ref 98–107)
CO2: 24 MMOL/L (ref 20–31)
CREAT SERPL-MCNC: 0.67 MG/DL (ref 0.5–0.9)
GFR AFRICAN AMERICAN: >60 ML/MIN
GFR NON-AFRICAN AMERICAN: >60 ML/MIN
GFR SERPL CREATININE-BSD FRML MDRD: ABNORMAL ML/MIN/{1.73_M2}
GFR SERPL CREATININE-BSD FRML MDRD: ABNORMAL ML/MIN/{1.73_M2}
GLUCOSE BLD-MCNC: 214 MG/DL (ref 70–99)
HCT VFR BLD CALC: 38.7 % (ref 36.3–47.1)
HEMOGLOBIN: 11.5 G/DL (ref 11.9–15.1)
INR BLD: 2.3
MCH RBC QN AUTO: 25.4 PG (ref 25.2–33.5)
MCHC RBC AUTO-ENTMCNC: 29.7 G/DL (ref 28.4–34.8)
MCV RBC AUTO: 85.4 FL (ref 82.6–102.9)
NRBC AUTOMATED: 0 PER 100 WBC
PDW BLD-RTO: 18.5 % (ref 11.8–14.4)
PLATELET # BLD: 408 K/UL (ref 138–453)
PMV BLD AUTO: 10.2 FL (ref 8.1–13.5)
POTASSIUM SERPL-SCNC: 3.4 MMOL/L (ref 3.7–5.3)
PROTHROMBIN TIME: 23.4 SEC (ref 9–12)
RBC # BLD: 4.53 M/UL (ref 3.95–5.11)
SODIUM BLD-SCNC: 139 MMOL/L (ref 135–144)
WBC # BLD: 12.1 K/UL (ref 3.5–11.3)

## 2018-05-08 PROCEDURE — 85610 PROTHROMBIN TIME: CPT

## 2018-05-08 PROCEDURE — 85027 COMPLETE CBC AUTOMATED: CPT

## 2018-05-08 PROCEDURE — 36415 COLL VENOUS BLD VENIPUNCTURE: CPT

## 2018-05-08 PROCEDURE — 80048 BASIC METABOLIC PNL TOTAL CA: CPT

## 2018-05-08 RX ORDER — SODIUM CHLORIDE, SODIUM LACTATE, POTASSIUM CHLORIDE, CALCIUM CHLORIDE 600; 310; 30; 20 MG/100ML; MG/100ML; MG/100ML; MG/100ML
1000 INJECTION, SOLUTION INTRAVENOUS CONTINUOUS
Status: CANCELLED | OUTPATIENT
Start: 2018-05-08

## 2018-05-08 ASSESSMENT — PAIN DESCRIPTION - LOCATION: LOCATION: HAND

## 2018-05-08 ASSESSMENT — PAIN DESCRIPTION - ORIENTATION: ORIENTATION: RIGHT;LEFT

## 2018-05-08 ASSESSMENT — PAIN DESCRIPTION - FREQUENCY: FREQUENCY: CONTINUOUS

## 2018-05-08 ASSESSMENT — PAIN SCALES - GENERAL: PAINLEVEL_OUTOF10: 5

## 2018-05-08 ASSESSMENT — PAIN DESCRIPTION - ONSET: ONSET: ON-GOING

## 2018-05-08 ASSESSMENT — PAIN DESCRIPTION - DESCRIPTORS: DESCRIPTORS: CONSTANT;DISCOMFORT;ACHING

## 2018-05-08 ASSESSMENT — PAIN DESCRIPTION - PAIN TYPE: TYPE: CHRONIC PAIN

## 2018-05-09 RX ORDER — HEPARIN SODIUM 5000 [USP'U]/ML
5000 INJECTION, SOLUTION INTRAVENOUS; SUBCUTANEOUS ONCE
Status: CANCELLED | OUTPATIENT
Start: 2018-05-09 | End: 2018-05-09

## 2018-05-14 ENCOUNTER — HOSPITAL ENCOUNTER (OUTPATIENT)
Age: 68
Discharge: HOME | End: 2018-05-14
Payer: MEDICARE

## 2018-05-14 ENCOUNTER — HOSPITAL ENCOUNTER (OUTPATIENT)
Dept: PHARMACY | Age: 68
Setting detail: THERAPIES SERIES
Discharge: HOME | End: 2018-05-14
Payer: MEDICARE

## 2018-05-14 DIAGNOSIS — I27.82 OTHER CHRONIC PULMONARY EMBOLISM WITHOUT ACUTE COR PULMONALE (HCC): ICD-10-CM

## 2018-05-14 DIAGNOSIS — I82.433 DEEP VEIN THROMBOSIS (DVT) OF POPLITEAL VEIN OF BOTH LOWER EXTREMITIES, UNSPECIFIED CHRONICITY (HCC): ICD-10-CM

## 2018-05-14 DIAGNOSIS — E11.65 TYPE 2 DIABETES MELLITUS WITH HYPERGLYCEMIA, WITHOUT LONG-TERM CURRENT USE OF INSULIN (HCC): ICD-10-CM

## 2018-05-14 DIAGNOSIS — Z11.59 ENCOUNTER FOR HEPATITIS C SCREENING TEST FOR LOW RISK PATIENT: ICD-10-CM

## 2018-05-14 DIAGNOSIS — D64.9 ANEMIA, UNSPECIFIED TYPE: ICD-10-CM

## 2018-05-14 LAB
ALBUMIN SERPL-MCNC: 3.8 G/DL (ref 3.5–5.2)
ALBUMIN/GLOBULIN RATIO: ABNORMAL (ref 1–2.5)
ALP BLD-CCNC: 77 U/L (ref 35–104)
ALT SERPL-CCNC: 15 U/L (ref 5–33)
ANION GAP SERPL CALCULATED.3IONS-SCNC: 14 MMOL/L (ref 9–17)
AST SERPL-CCNC: 14 U/L
BILIRUB SERPL-MCNC: 0.21 MG/DL (ref 0.3–1.2)
BUN BLDV-MCNC: 15 MG/DL (ref 8–23)
BUN/CREAT BLD: ABNORMAL (ref 9–20)
CALCIUM SERPL-MCNC: 9.5 MG/DL (ref 8.6–10.4)
CHLORIDE BLD-SCNC: 100 MMOL/L (ref 98–107)
CO2: 26 MMOL/L (ref 20–31)
CREAT SERPL-MCNC: 0.63 MG/DL (ref 0.5–0.9)
ESTIMATED AVERAGE GLUCOSE: 151 MG/DL
GFR AFRICAN AMERICAN: >60 ML/MIN
GFR NON-AFRICAN AMERICAN: >60 ML/MIN
GFR SERPL CREATININE-BSD FRML MDRD: ABNORMAL ML/MIN/{1.73_M2}
GFR SERPL CREATININE-BSD FRML MDRD: ABNORMAL ML/MIN/{1.73_M2}
GLUCOSE BLD-MCNC: 161 MG/DL (ref 70–99)
HBA1C MFR BLD: 6.9 % (ref 4–6)
HCT VFR BLD CALC: 36.9 % (ref 36–46)
HEMOGLOBIN: 12.1 G/DL (ref 12–16)
HEPATITIS C ANTIBODY: NONREACTIVE
INR BLD: 3
IRON SATURATION: 12 % (ref 20–55)
IRON: 37 UG/DL (ref 37–145)
MCH RBC QN AUTO: 26.7 PG (ref 26–34)
MCHC RBC AUTO-ENTMCNC: 32.6 G/DL (ref 31–37)
MCV RBC AUTO: 81.8 FL (ref 80–100)
NRBC AUTOMATED: ABNORMAL PER 100 WBC
PDW BLD-RTO: 20.1 % (ref 11.5–14.9)
PLATELET # BLD: 414 K/UL (ref 150–450)
PMV BLD AUTO: 8 FL (ref 6–12)
POTASSIUM SERPL-SCNC: 3.5 MMOL/L (ref 3.7–5.3)
PROTIME: 35.9 SECONDS
RBC # BLD: 4.51 M/UL (ref 4–5.2)
SODIUM BLD-SCNC: 140 MMOL/L (ref 135–144)
TOTAL IRON BINDING CAPACITY: 304 UG/DL (ref 250–450)
TOTAL PROTEIN: 7.2 G/DL (ref 6.4–8.3)
UNSATURATED IRON BINDING CAPACITY: 267 UG/DL (ref 112–347)
WBC # BLD: 14.2 K/UL (ref 3.5–11)

## 2018-05-14 PROCEDURE — 85610 PROTHROMBIN TIME: CPT

## 2018-05-14 PROCEDURE — 80053 COMPREHEN METABOLIC PANEL: CPT

## 2018-05-14 PROCEDURE — 83540 ASSAY OF IRON: CPT

## 2018-05-14 PROCEDURE — 99211 OFF/OP EST MAY X REQ PHY/QHP: CPT

## 2018-05-14 PROCEDURE — 86803 HEPATITIS C AB TEST: CPT

## 2018-05-14 PROCEDURE — 36415 COLL VENOUS BLD VENIPUNCTURE: CPT

## 2018-05-14 PROCEDURE — 85027 COMPLETE CBC AUTOMATED: CPT

## 2018-05-14 PROCEDURE — 83550 IRON BINDING TEST: CPT

## 2018-05-14 PROCEDURE — 83036 HEMOGLOBIN GLYCOSYLATED A1C: CPT

## 2018-05-18 ENCOUNTER — ANESTHESIA EVENT (OUTPATIENT)
Dept: OPERATING ROOM | Age: 68
DRG: 328 | End: 2018-05-18
Payer: MEDICARE

## 2018-05-21 ENCOUNTER — HOSPITAL ENCOUNTER (INPATIENT)
Age: 68
LOS: 1 days | Discharge: HOME | DRG: 328 | End: 2018-05-22
Attending: SURGERY | Admitting: SURGERY
Payer: MEDICARE

## 2018-05-21 ENCOUNTER — ANESTHESIA (OUTPATIENT)
Dept: OPERATING ROOM | Age: 68
DRG: 328 | End: 2018-05-21
Payer: MEDICARE

## 2018-05-21 VITALS
SYSTOLIC BLOOD PRESSURE: 139 MMHG | TEMPERATURE: 96.7 F | DIASTOLIC BLOOD PRESSURE: 68 MMHG | OXYGEN SATURATION: 97 % | RESPIRATION RATE: 18 BRPM

## 2018-05-21 DIAGNOSIS — K44.9 HIATAL HERNIA: Primary | ICD-10-CM

## 2018-05-21 LAB
GLUCOSE BLD-MCNC: 167 MG/DL (ref 74–100)
GLUCOSE BLD-MCNC: 179 MG/DL (ref 65–105)
GLUCOSE BLD-MCNC: 209 MG/DL (ref 65–105)
GLUCOSE BLD-MCNC: 211 MG/DL (ref 65–105)
POC INR: 1.1
POC POTASSIUM: 3.2 MMOL/L (ref 3.5–4.5)
PROTHROMBIN TIME, POC: 13.4 SEC (ref 10.4–14.2)

## 2018-05-21 PROCEDURE — 6360000002 HC RX W HCPCS: Performed by: SURGERY

## 2018-05-21 PROCEDURE — 2500000003 HC RX 250 WO HCPCS: Performed by: NURSE ANESTHETIST, CERTIFIED REGISTERED

## 2018-05-21 PROCEDURE — 85610 PROTHROMBIN TIME: CPT

## 2018-05-21 PROCEDURE — 0BQT4ZZ REPAIR DIAPHRAGM, PERCUTANEOUS ENDOSCOPIC APPROACH: ICD-10-PCS | Performed by: SURGERY

## 2018-05-21 PROCEDURE — 2580000003 HC RX 258: Performed by: ANESTHESIOLOGY

## 2018-05-21 PROCEDURE — 3600000019 HC SURGERY ROBOT ADDTL 15MIN: Performed by: SURGERY

## 2018-05-21 PROCEDURE — 2720000010 HC SURG SUPPLY STERILE: Performed by: SURGERY

## 2018-05-21 PROCEDURE — 82947 ASSAY GLUCOSE BLOOD QUANT: CPT

## 2018-05-21 PROCEDURE — 7100000000 HC PACU RECOVERY - FIRST 15 MIN: Performed by: SURGERY

## 2018-05-21 PROCEDURE — 6360000002 HC RX W HCPCS: Performed by: ANESTHESIOLOGY

## 2018-05-21 PROCEDURE — 84132 ASSAY OF SERUM POTASSIUM: CPT

## 2018-05-21 PROCEDURE — 2500000003 HC RX 250 WO HCPCS: Performed by: SURGERY

## 2018-05-21 PROCEDURE — 6360000002 HC RX W HCPCS: Performed by: NURSE ANESTHETIST, CERTIFIED REGISTERED

## 2018-05-21 PROCEDURE — C1768 GRAFT, VASCULAR: HCPCS | Performed by: SURGERY

## 2018-05-21 PROCEDURE — 3700000000 HC ANESTHESIA ATTENDED CARE: Performed by: SURGERY

## 2018-05-21 PROCEDURE — S2900 ROBOTIC SURGICAL SYSTEM: HCPCS | Performed by: SURGERY

## 2018-05-21 PROCEDURE — 1200000000 HC SEMI PRIVATE

## 2018-05-21 PROCEDURE — 3700000001 HC ADD 15 MINUTES (ANESTHESIA): Performed by: SURGERY

## 2018-05-21 PROCEDURE — 6370000000 HC RX 637 (ALT 250 FOR IP): Performed by: SURGERY

## 2018-05-21 PROCEDURE — 8E0W4CZ ROBOTIC ASSISTED PROCEDURE OF TRUNK REGION, PERCUTANEOUS ENDOSCOPIC APPROACH: ICD-10-PCS | Performed by: SURGERY

## 2018-05-21 PROCEDURE — 7100000001 HC PACU RECOVERY - ADDTL 15 MIN: Performed by: SURGERY

## 2018-05-21 PROCEDURE — 6360000002 HC RX W HCPCS

## 2018-05-21 PROCEDURE — 2580000003 HC RX 258: Performed by: SURGERY

## 2018-05-21 PROCEDURE — 43281 LAP PARAESOPHAG HERN REPAIR: CPT | Performed by: SURGERY

## 2018-05-21 PROCEDURE — 3600000009 HC SURGERY ROBOT BASE: Performed by: SURGERY

## 2018-05-21 PROCEDURE — L0450 TLSO FLEX TRUNK/THOR PRE OTS: HCPCS | Performed by: SURGERY

## 2018-05-21 PROCEDURE — 0DV44ZZ RESTRICTION OF ESOPHAGOGASTRIC JUNCTION, PERCUTANEOUS ENDOSCOPIC APPROACH: ICD-10-PCS | Performed by: SURGERY

## 2018-05-21 PROCEDURE — S0028 INJECTION, FAMOTIDINE, 20 MG: HCPCS | Performed by: SURGERY

## 2018-05-21 DEVICE — BARD® PTFE FELT, 2.5 CM X 15.2 CM
Type: IMPLANTABLE DEVICE | Site: ABDOMEN | Status: FUNCTIONAL
Brand: BARD® PTFE FELT

## 2018-05-21 RX ORDER — PROPOFOL 10 MG/ML
INJECTION, EMULSION INTRAVENOUS PRN
Status: DISCONTINUED | OUTPATIENT
Start: 2018-05-21 | End: 2018-05-21 | Stop reason: SDUPTHER

## 2018-05-21 RX ORDER — MORPHINE SULFATE 4 MG/ML
6 INJECTION, SOLUTION INTRAMUSCULAR; INTRAVENOUS
Status: DISCONTINUED | OUTPATIENT
Start: 2018-05-21 | End: 2018-05-21

## 2018-05-21 RX ORDER — PROMETHAZINE HYDROCHLORIDE 25 MG/ML
25 INJECTION, SOLUTION INTRAMUSCULAR; INTRAVENOUS EVERY 6 HOURS PRN
Status: DISCONTINUED | OUTPATIENT
Start: 2018-05-21 | End: 2018-05-22 | Stop reason: HOSPADM

## 2018-05-21 RX ORDER — NICOTINE POLACRILEX 4 MG
15 LOZENGE BUCCAL PRN
Status: DISCONTINUED | OUTPATIENT
Start: 2018-05-21 | End: 2018-05-22 | Stop reason: HOSPADM

## 2018-05-21 RX ORDER — GLUCAGON 1 MG/ML
1 KIT INJECTION PRN
Status: DISCONTINUED | OUTPATIENT
Start: 2018-05-21 | End: 2018-05-22 | Stop reason: HOSPADM

## 2018-05-21 RX ORDER — ONDANSETRON 4 MG/1
4 TABLET, FILM COATED ORAL EVERY 6 HOURS PRN
Qty: 20 TABLET | Refills: 0 | Status: SHIPPED | OUTPATIENT
Start: 2018-05-21 | End: 2019-05-28 | Stop reason: ALTCHOICE

## 2018-05-21 RX ORDER — SODIUM CHLORIDE 0.9 % (FLUSH) 0.9 %
10 SYRINGE (ML) INJECTION PRN
Status: DISCONTINUED | OUTPATIENT
Start: 2018-05-21 | End: 2018-05-22 | Stop reason: HOSPADM

## 2018-05-21 RX ORDER — MAGNESIUM HYDROXIDE 1200 MG/15ML
LIQUID ORAL CONTINUOUS PRN
Status: COMPLETED | OUTPATIENT
Start: 2018-05-21 | End: 2018-05-21

## 2018-05-21 RX ORDER — BUPIVACAINE HYDROCHLORIDE AND EPINEPHRINE 5; 5 MG/ML; UG/ML
INJECTION, SOLUTION EPIDURAL; INTRACAUDAL; PERINEURAL PRN
Status: DISCONTINUED | OUTPATIENT
Start: 2018-05-21 | End: 2018-05-21 | Stop reason: HOSPADM

## 2018-05-21 RX ORDER — LIDOCAINE HYDROCHLORIDE 10 MG/ML
1 INJECTION, SOLUTION EPIDURAL; INFILTRATION; INTRACAUDAL; PERINEURAL
Status: DISCONTINUED | OUTPATIENT
Start: 2018-05-21 | End: 2018-05-21 | Stop reason: HOSPADM

## 2018-05-21 RX ORDER — SODIUM CHLORIDE, SODIUM LACTATE, POTASSIUM CHLORIDE, CALCIUM CHLORIDE 600; 310; 30; 20 MG/100ML; MG/100ML; MG/100ML; MG/100ML
INJECTION, SOLUTION INTRAVENOUS CONTINUOUS
Status: DISCONTINUED | OUTPATIENT
Start: 2018-05-21 | End: 2018-05-21

## 2018-05-21 RX ORDER — SODIUM CHLORIDE, SODIUM LACTATE, POTASSIUM CHLORIDE, CALCIUM CHLORIDE 600; 310; 30; 20 MG/100ML; MG/100ML; MG/100ML; MG/100ML
1000 INJECTION, SOLUTION INTRAVENOUS CONTINUOUS
Status: DISCONTINUED | OUTPATIENT
Start: 2018-05-21 | End: 2018-05-21

## 2018-05-21 RX ORDER — KETOROLAC TROMETHAMINE 30 MG/ML
INJECTION, SOLUTION INTRAMUSCULAR; INTRAVENOUS PRN
Status: DISCONTINUED | OUTPATIENT
Start: 2018-05-21 | End: 2018-05-21 | Stop reason: SDUPTHER

## 2018-05-21 RX ORDER — OXYCODONE HCL 5 MG/5 ML
5 SOLUTION, ORAL ORAL EVERY 6 HOURS PRN
Qty: 140 ML | Refills: 0 | Status: SHIPPED | OUTPATIENT
Start: 2018-05-21 | End: 2018-05-28

## 2018-05-21 RX ORDER — WARFARIN SODIUM 3 MG/1
3 TABLET ORAL
Status: COMPLETED | OUTPATIENT
Start: 2018-05-21 | End: 2018-05-21

## 2018-05-21 RX ORDER — FENTANYL CITRATE 50 UG/ML
50 INJECTION, SOLUTION INTRAMUSCULAR; INTRAVENOUS EVERY 5 MIN PRN
Status: DISCONTINUED | OUTPATIENT
Start: 2018-05-21 | End: 2018-05-21 | Stop reason: HOSPADM

## 2018-05-21 RX ORDER — MORPHINE SULFATE 4 MG/ML
4 INJECTION, SOLUTION INTRAMUSCULAR; INTRAVENOUS
Status: DISCONTINUED | OUTPATIENT
Start: 2018-05-21 | End: 2018-05-21

## 2018-05-21 RX ORDER — DEXTROSE MONOHYDRATE 25 G/50ML
12.5 INJECTION, SOLUTION INTRAVENOUS PRN
Status: DISCONTINUED | OUTPATIENT
Start: 2018-05-21 | End: 2018-05-22 | Stop reason: HOSPADM

## 2018-05-21 RX ORDER — DEXAMETHASONE SODIUM PHOSPHATE 10 MG/ML
INJECTION INTRAMUSCULAR; INTRAVENOUS PRN
Status: DISCONTINUED | OUTPATIENT
Start: 2018-05-21 | End: 2018-05-21 | Stop reason: SDUPTHER

## 2018-05-21 RX ORDER — GLYCOPYRROLATE 1 MG/5 ML
SYRINGE (ML) INTRAVENOUS PRN
Status: DISCONTINUED | OUTPATIENT
Start: 2018-05-21 | End: 2018-05-21 | Stop reason: SDUPTHER

## 2018-05-21 RX ORDER — FENTANYL CITRATE 50 UG/ML
50 INJECTION, SOLUTION INTRAMUSCULAR; INTRAVENOUS
Status: DISCONTINUED | OUTPATIENT
Start: 2018-05-21 | End: 2018-05-22 | Stop reason: HOSPADM

## 2018-05-21 RX ORDER — ONDANSETRON 2 MG/ML
INJECTION INTRAMUSCULAR; INTRAVENOUS PRN
Status: DISCONTINUED | OUTPATIENT
Start: 2018-05-21 | End: 2018-05-21 | Stop reason: SDUPTHER

## 2018-05-21 RX ORDER — KETOROLAC TROMETHAMINE 30 MG/ML
30 INJECTION, SOLUTION INTRAMUSCULAR; INTRAVENOUS EVERY 6 HOURS
Status: DISCONTINUED | OUTPATIENT
Start: 2018-05-21 | End: 2018-05-21

## 2018-05-21 RX ORDER — MEPERIDINE HYDROCHLORIDE 50 MG/ML
12.5 INJECTION INTRAMUSCULAR; INTRAVENOUS; SUBCUTANEOUS EVERY 5 MIN PRN
Status: DISCONTINUED | OUTPATIENT
Start: 2018-05-21 | End: 2018-05-21 | Stop reason: HOSPADM

## 2018-05-21 RX ORDER — SODIUM CHLORIDE 0.9 % (FLUSH) 0.9 %
10 SYRINGE (ML) INJECTION EVERY 12 HOURS SCHEDULED
Status: DISCONTINUED | OUTPATIENT
Start: 2018-05-21 | End: 2018-05-22 | Stop reason: HOSPADM

## 2018-05-21 RX ORDER — LIDOCAINE HYDROCHLORIDE 10 MG/ML
INJECTION, SOLUTION EPIDURAL; INFILTRATION; INTRACAUDAL; PERINEURAL PRN
Status: DISCONTINUED | OUTPATIENT
Start: 2018-05-21 | End: 2018-05-21 | Stop reason: SDUPTHER

## 2018-05-21 RX ORDER — FENTANYL CITRATE 50 UG/ML
INJECTION, SOLUTION INTRAMUSCULAR; INTRAVENOUS PRN
Status: DISCONTINUED | OUTPATIENT
Start: 2018-05-21 | End: 2018-05-21 | Stop reason: SDUPTHER

## 2018-05-21 RX ORDER — LORAZEPAM 0.5 MG/1
0.5 TABLET ORAL 2 TIMES DAILY PRN
COMMUNITY
End: 2018-06-22 | Stop reason: SDUPTHER

## 2018-05-21 RX ORDER — SODIUM CHLORIDE 9 MG/ML
INJECTION, SOLUTION INTRAVENOUS CONTINUOUS
Status: DISCONTINUED | OUTPATIENT
Start: 2018-05-21 | End: 2018-05-22

## 2018-05-21 RX ORDER — HEPARIN SODIUM 5000 [USP'U]/ML
5000 INJECTION, SOLUTION INTRAVENOUS; SUBCUTANEOUS ONCE
Status: DISCONTINUED | OUTPATIENT
Start: 2018-05-21 | End: 2018-05-21 | Stop reason: HOSPADM

## 2018-05-21 RX ORDER — PROMETHAZINE HYDROCHLORIDE 25 MG/ML
INJECTION, SOLUTION INTRAMUSCULAR; INTRAVENOUS
Status: COMPLETED
Start: 2018-05-21 | End: 2018-05-21

## 2018-05-21 RX ORDER — FENTANYL CITRATE 50 UG/ML
25 INJECTION, SOLUTION INTRAMUSCULAR; INTRAVENOUS
Status: DISCONTINUED | OUTPATIENT
Start: 2018-05-21 | End: 2018-05-22 | Stop reason: HOSPADM

## 2018-05-21 RX ORDER — SODIUM CHLORIDE 0.9 % (FLUSH) 0.9 %
10 SYRINGE (ML) INJECTION PRN
Status: DISCONTINUED | OUTPATIENT
Start: 2018-05-21 | End: 2018-05-21 | Stop reason: HOSPADM

## 2018-05-21 RX ORDER — SODIUM CHLORIDE 0.9 % (FLUSH) 0.9 %
10 SYRINGE (ML) INJECTION EVERY 12 HOURS SCHEDULED
Status: DISCONTINUED | OUTPATIENT
Start: 2018-05-21 | End: 2018-05-21 | Stop reason: HOSPADM

## 2018-05-21 RX ORDER — DEXTROSE MONOHYDRATE 50 MG/ML
100 INJECTION, SOLUTION INTRAVENOUS PRN
Status: DISCONTINUED | OUTPATIENT
Start: 2018-05-21 | End: 2018-05-22 | Stop reason: HOSPADM

## 2018-05-21 RX ORDER — OXYCODONE HCL 5 MG/5 ML
5 SOLUTION, ORAL ORAL EVERY 4 HOURS PRN
Status: DISCONTINUED | OUTPATIENT
Start: 2018-05-21 | End: 2018-05-22 | Stop reason: HOSPADM

## 2018-05-21 RX ORDER — ROCURONIUM BROMIDE 10 MG/ML
INJECTION, SOLUTION INTRAVENOUS PRN
Status: DISCONTINUED | OUTPATIENT
Start: 2018-05-21 | End: 2018-05-21 | Stop reason: SDUPTHER

## 2018-05-21 RX ORDER — KETOROLAC TROMETHAMINE 30 MG/ML
30 INJECTION, SOLUTION INTRAMUSCULAR; INTRAVENOUS EVERY 6 HOURS
Status: DISCONTINUED | OUTPATIENT
Start: 2018-05-21 | End: 2018-05-22 | Stop reason: HOSPADM

## 2018-05-21 RX ORDER — ONDANSETRON 2 MG/ML
4 INJECTION INTRAMUSCULAR; INTRAVENOUS EVERY 6 HOURS PRN
Status: DISCONTINUED | OUTPATIENT
Start: 2018-05-21 | End: 2018-05-22 | Stop reason: HOSPADM

## 2018-05-21 RX ADMIN — ROCURONIUM BROMIDE 20 MG: 10 INJECTION INTRAVENOUS at 11:40

## 2018-05-21 RX ADMIN — FAMOTIDINE 20 MG: 10 INJECTION INTRAVENOUS at 21:35

## 2018-05-21 RX ADMIN — ONDANSETRON 4 MG: 2 INJECTION, SOLUTION INTRAMUSCULAR; INTRAVENOUS at 18:50

## 2018-05-21 RX ADMIN — PHENYLEPHRINE HYDROCHLORIDE 100 MCG: 10 INJECTION INTRAVENOUS at 11:11

## 2018-05-21 RX ADMIN — PROPOFOL 200 MG: 10 INJECTION, EMULSION INTRAVENOUS at 10:50

## 2018-05-21 RX ADMIN — INSULIN LISPRO 2 UNITS: 100 INJECTION, SOLUTION INTRAVENOUS; SUBCUTANEOUS at 21:35

## 2018-05-21 RX ADMIN — SODIUM CHLORIDE, POTASSIUM CHLORIDE, SODIUM LACTATE AND CALCIUM CHLORIDE: 600; 310; 30; 20 INJECTION, SOLUTION INTRAVENOUS at 09:27

## 2018-05-21 RX ADMIN — PROMETHAZINE HYDROCHLORIDE 25 MG: 25 INJECTION, SOLUTION INTRAMUSCULAR; INTRAVENOUS at 13:20

## 2018-05-21 RX ADMIN — PHENYLEPHRINE HYDROCHLORIDE 100 MCG: 10 INJECTION INTRAVENOUS at 11:06

## 2018-05-21 RX ADMIN — NEOSTIGMINE METHYLSULFATE 3 MG: 1 INJECTION, SOLUTION INTRAMUSCULAR; INTRAVENOUS; SUBCUTANEOUS at 12:34

## 2018-05-21 RX ADMIN — FENTANYL CITRATE 50 MCG: 50 INJECTION, SOLUTION INTRAMUSCULAR; INTRAVENOUS at 17:32

## 2018-05-21 RX ADMIN — FENTANYL CITRATE 25 MCG: 50 INJECTION INTRAMUSCULAR; INTRAVENOUS at 12:35

## 2018-05-21 RX ADMIN — PHENYLEPHRINE HYDROCHLORIDE 100 MCG: 10 INJECTION INTRAVENOUS at 11:10

## 2018-05-21 RX ADMIN — Medication 0.4 MG: at 12:34

## 2018-05-21 RX ADMIN — PROMETHAZINE HYDROCHLORIDE 25 MG: 25 INJECTION INTRAMUSCULAR; INTRAVENOUS at 13:20

## 2018-05-21 RX ADMIN — PHENYLEPHRINE HYDROCHLORIDE 100 MCG: 10 INJECTION INTRAVENOUS at 12:26

## 2018-05-21 RX ADMIN — OXYCODONE HYDROCHLORIDE 5 MG: 5 SOLUTION ORAL at 14:17

## 2018-05-21 RX ADMIN — SODIUM CHLORIDE: 9 INJECTION, SOLUTION INTRAVENOUS at 14:17

## 2018-05-21 RX ADMIN — ONDANSETRON 4 MG: 2 INJECTION INTRAMUSCULAR; INTRAVENOUS at 12:29

## 2018-05-21 RX ADMIN — PHENYLEPHRINE HYDROCHLORIDE 100 MCG: 10 INJECTION INTRAVENOUS at 12:08

## 2018-05-21 RX ADMIN — LIDOCAINE HYDROCHLORIDE 50 MG: 10 INJECTION, SOLUTION EPIDURAL; INFILTRATION; INTRACAUDAL; PERINEURAL at 10:50

## 2018-05-21 RX ADMIN — ROCURONIUM BROMIDE 50 MG: 10 INJECTION INTRAVENOUS at 10:50

## 2018-05-21 RX ADMIN — OXYCODONE HYDROCHLORIDE 5 MG: 5 SOLUTION ORAL at 18:49

## 2018-05-21 RX ADMIN — FENTANYL CITRATE 25 MCG: 50 INJECTION INTRAMUSCULAR; INTRAVENOUS at 12:20

## 2018-05-21 RX ADMIN — FENTANYL CITRATE 50 MCG: 50 INJECTION INTRAMUSCULAR; INTRAVENOUS at 13:10

## 2018-05-21 RX ADMIN — FENTANYL CITRATE 100 MCG: 50 INJECTION INTRAMUSCULAR; INTRAVENOUS at 10:50

## 2018-05-21 RX ADMIN — DEXAMETHASONE SODIUM PHOSPHATE 10 MG: 10 INJECTION INTRAMUSCULAR; INTRAVENOUS at 12:29

## 2018-05-21 RX ADMIN — KETOROLAC TROMETHAMINE 30 MG: 30 INJECTION, SOLUTION INTRAMUSCULAR; INTRAVENOUS at 18:50

## 2018-05-21 RX ADMIN — OXYCODONE HYDROCHLORIDE 5 MG: 5 SOLUTION ORAL at 22:59

## 2018-05-21 RX ADMIN — FENTANYL CITRATE 50 MCG: 50 INJECTION INTRAMUSCULAR; INTRAVENOUS at 13:15

## 2018-05-21 RX ADMIN — WARFARIN SODIUM 3 MG: 3 TABLET ORAL at 21:35

## 2018-05-21 RX ADMIN — Medication 2 G: at 11:01

## 2018-05-21 RX ADMIN — KETOROLAC TROMETHAMINE 30 MG: 30 INJECTION, SOLUTION INTRAMUSCULAR; INTRAVENOUS at 12:28

## 2018-05-21 ASSESSMENT — PULMONARY FUNCTION TESTS
PIF_VALUE: 1
PIF_VALUE: 24
PIF_VALUE: 3
PIF_VALUE: 23
PIF_VALUE: 24
PIF_VALUE: 25
PIF_VALUE: 24
PIF_VALUE: 25
PIF_VALUE: 24
PIF_VALUE: 24
PIF_VALUE: 23
PIF_VALUE: 22
PIF_VALUE: 24
PIF_VALUE: 24
PIF_VALUE: 23
PIF_VALUE: 22
PIF_VALUE: 24
PIF_VALUE: 24
PIF_VALUE: 13
PIF_VALUE: 23
PIF_VALUE: 25
PIF_VALUE: 1
PIF_VALUE: 24
PIF_VALUE: 25
PIF_VALUE: 23
PIF_VALUE: 24
PIF_VALUE: 21
PIF_VALUE: 24
PIF_VALUE: 21
PIF_VALUE: 1
PIF_VALUE: 22
PIF_VALUE: 22
PIF_VALUE: 24
PIF_VALUE: 23
PIF_VALUE: 25
PIF_VALUE: 24
PIF_VALUE: 23
PIF_VALUE: 24
PIF_VALUE: 22
PIF_VALUE: 22
PIF_VALUE: 16
PIF_VALUE: 23
PIF_VALUE: 24
PIF_VALUE: 23
PIF_VALUE: 25
PIF_VALUE: 23
PIF_VALUE: 25
PIF_VALUE: 6
PIF_VALUE: 21
PIF_VALUE: 24
PIF_VALUE: 22
PIF_VALUE: 16
PIF_VALUE: 17
PIF_VALUE: 25
PIF_VALUE: 23
PIF_VALUE: 25
PIF_VALUE: 2
PIF_VALUE: 22
PIF_VALUE: 24
PIF_VALUE: 27
PIF_VALUE: 23
PIF_VALUE: 33
PIF_VALUE: 18
PIF_VALUE: 3
PIF_VALUE: 23
PIF_VALUE: 25
PIF_VALUE: 24
PIF_VALUE: 25
PIF_VALUE: 22
PIF_VALUE: 16
PIF_VALUE: 22
PIF_VALUE: 3
PIF_VALUE: 25
PIF_VALUE: 23
PIF_VALUE: 23
PIF_VALUE: 25
PIF_VALUE: 16
PIF_VALUE: 23
PIF_VALUE: 24
PIF_VALUE: 0
PIF_VALUE: 23
PIF_VALUE: 17
PIF_VALUE: 16
PIF_VALUE: 21
PIF_VALUE: 22
PIF_VALUE: 27
PIF_VALUE: 16
PIF_VALUE: 25
PIF_VALUE: 21
PIF_VALUE: 25
PIF_VALUE: 24
PIF_VALUE: 25
PIF_VALUE: 25
PIF_VALUE: 24
PIF_VALUE: 23
PIF_VALUE: 3
PIF_VALUE: 24
PIF_VALUE: 18
PIF_VALUE: 23
PIF_VALUE: 25
PIF_VALUE: 16
PIF_VALUE: 24
PIF_VALUE: 25
PIF_VALUE: 23
PIF_VALUE: 21
PIF_VALUE: 23
PIF_VALUE: 16
PIF_VALUE: 23
PIF_VALUE: 22
PIF_VALUE: 6
PIF_VALUE: 16
PIF_VALUE: 24
PIF_VALUE: 22
PIF_VALUE: 8

## 2018-05-21 ASSESSMENT — PAIN DESCRIPTION - DESCRIPTORS
DESCRIPTORS: PRESSURE;CRAMPING
DESCRIPTORS: DISCOMFORT

## 2018-05-21 ASSESSMENT — PAIN SCALES - GENERAL
PAINLEVEL_OUTOF10: 9
PAINLEVEL_OUTOF10: 6
PAINLEVEL_OUTOF10: 6
PAINLEVEL_OUTOF10: 7
PAINLEVEL_OUTOF10: 5
PAINLEVEL_OUTOF10: 6
PAINLEVEL_OUTOF10: 6
PAINLEVEL_OUTOF10: 7
PAINLEVEL_OUTOF10: 6
PAINLEVEL_OUTOF10: 6
PAINLEVEL_OUTOF10: 5
PAINLEVEL_OUTOF10: 4
PAINLEVEL_OUTOF10: 7

## 2018-05-21 ASSESSMENT — PAIN DESCRIPTION - PROGRESSION
CLINICAL_PROGRESSION: NOT CHANGED
CLINICAL_PROGRESSION: GRADUALLY IMPROVING
CLINICAL_PROGRESSION: GRADUALLY WORSENING

## 2018-05-21 ASSESSMENT — PAIN DESCRIPTION - LOCATION
LOCATION: ABDOMEN
LOCATION: ABDOMEN

## 2018-05-21 ASSESSMENT — PAIN DESCRIPTION - PAIN TYPE
TYPE: SURGICAL PAIN
TYPE: SURGICAL PAIN

## 2018-05-21 ASSESSMENT — PAIN - FUNCTIONAL ASSESSMENT: PAIN_FUNCTIONAL_ASSESSMENT: 0-10

## 2018-05-21 ASSESSMENT — PAIN DESCRIPTION - FREQUENCY
FREQUENCY: CONTINUOUS
FREQUENCY: CONTINUOUS

## 2018-05-21 ASSESSMENT — PAIN DESCRIPTION - ONSET: ONSET: AWAKENED FROM SLEEP

## 2018-05-21 ASSESSMENT — PAIN DESCRIPTION - ORIENTATION: ORIENTATION: RIGHT;LEFT

## 2018-05-22 VITALS
HEIGHT: 64 IN | BODY MASS INDEX: 28 KG/M2 | OXYGEN SATURATION: 93 % | WEIGHT: 164 LBS | HEART RATE: 67 BPM | DIASTOLIC BLOOD PRESSURE: 55 MMHG | TEMPERATURE: 98.4 F | SYSTOLIC BLOOD PRESSURE: 97 MMHG | RESPIRATION RATE: 14 BRPM

## 2018-05-22 LAB
GLUCOSE BLD-MCNC: 145 MG/DL (ref 65–105)
GLUCOSE BLD-MCNC: 151 MG/DL (ref 65–105)

## 2018-05-22 PROCEDURE — 6360000002 HC RX W HCPCS: Performed by: SURGERY

## 2018-05-22 PROCEDURE — 82947 ASSAY GLUCOSE BLOOD QUANT: CPT

## 2018-05-22 PROCEDURE — 2500000003 HC RX 250 WO HCPCS: Performed by: SURGERY

## 2018-05-22 PROCEDURE — S0028 INJECTION, FAMOTIDINE, 20 MG: HCPCS | Performed by: SURGERY

## 2018-05-22 PROCEDURE — 94762 N-INVAS EAR/PLS OXIMTRY CONT: CPT

## 2018-05-22 PROCEDURE — 6370000000 HC RX 637 (ALT 250 FOR IP): Performed by: SURGERY

## 2018-05-22 RX ORDER — LORAZEPAM 0.5 MG/1
0.5 TABLET ORAL 2 TIMES DAILY PRN
Status: DISCONTINUED | OUTPATIENT
Start: 2018-05-22 | End: 2018-05-22 | Stop reason: HOSPADM

## 2018-05-22 RX ADMIN — OXYCODONE HYDROCHLORIDE 5 MG: 5 SOLUTION ORAL at 07:08

## 2018-05-22 RX ADMIN — OXYCODONE HYDROCHLORIDE 5 MG: 5 SOLUTION ORAL at 12:58

## 2018-05-22 RX ADMIN — LORAZEPAM 0.5 MG: 0.5 TABLET ORAL at 09:01

## 2018-05-22 RX ADMIN — ENOXAPARIN SODIUM 40 MG: 40 INJECTION SUBCUTANEOUS at 09:01

## 2018-05-22 RX ADMIN — KETOROLAC TROMETHAMINE 30 MG: 30 INJECTION, SOLUTION INTRAMUSCULAR; INTRAVENOUS at 00:12

## 2018-05-22 RX ADMIN — OXYCODONE HYDROCHLORIDE 5 MG: 5 SOLUTION ORAL at 02:59

## 2018-05-22 RX ADMIN — KETOROLAC TROMETHAMINE 30 MG: 30 INJECTION, SOLUTION INTRAMUSCULAR; INTRAVENOUS at 07:08

## 2018-05-22 RX ADMIN — FAMOTIDINE 20 MG: 10 INJECTION INTRAVENOUS at 09:01

## 2018-05-22 ASSESSMENT — PAIN SCALES - GENERAL
PAINLEVEL_OUTOF10: 3
PAINLEVEL_OUTOF10: 6
PAINLEVEL_OUTOF10: 6
PAINLEVEL_OUTOF10: 8
PAINLEVEL_OUTOF10: 5
PAINLEVEL_OUTOF10: 7
PAINLEVEL_OUTOF10: 6

## 2018-05-23 ENCOUNTER — TELEPHONE (OUTPATIENT)
Dept: BARIATRICS/WEIGHT MGMT | Age: 68
End: 2018-05-23

## 2018-05-23 ENCOUNTER — CARE COORDINATION (OUTPATIENT)
Dept: CASE MANAGEMENT | Age: 68
End: 2018-05-23

## 2018-05-23 ENCOUNTER — CARE COORDINATION (OUTPATIENT)
Dept: CARE COORDINATION | Age: 68
End: 2018-05-23

## 2018-05-23 DIAGNOSIS — K44.9 HIATAL HERNIA: Primary | ICD-10-CM

## 2018-05-23 PROCEDURE — 1111F DSCHRG MED/CURRENT MED MERGE: CPT | Performed by: NURSE PRACTITIONER

## 2018-05-25 ENCOUNTER — CARE COORDINATION (OUTPATIENT)
Dept: CASE MANAGEMENT | Age: 68
End: 2018-05-25

## 2018-05-25 ENCOUNTER — HOSPITAL ENCOUNTER (OUTPATIENT)
Dept: PHARMACY | Age: 68
Setting detail: THERAPIES SERIES
Discharge: HOME | End: 2018-05-25
Payer: MEDICARE

## 2018-05-25 LAB
INR BLD: 1.7
PROTIME: 20.4 SECONDS

## 2018-05-25 PROCEDURE — 99211 OFF/OP EST MAY X REQ PHY/QHP: CPT

## 2018-05-25 PROCEDURE — 85610 PROTHROMBIN TIME: CPT

## 2018-05-29 ENCOUNTER — OFFICE VISIT (OUTPATIENT)
Dept: BARIATRICS/WEIGHT MGMT | Age: 68
End: 2018-05-29

## 2018-05-29 VITALS
SYSTOLIC BLOOD PRESSURE: 138 MMHG | TEMPERATURE: 97.6 F | DIASTOLIC BLOOD PRESSURE: 78 MMHG | BODY MASS INDEX: 28.34 KG/M2 | HEIGHT: 64 IN | HEART RATE: 72 BPM | WEIGHT: 166 LBS | RESPIRATION RATE: 20 BRPM

## 2018-05-29 DIAGNOSIS — I10 ESSENTIAL (PRIMARY) HYPERTENSION: ICD-10-CM

## 2018-05-29 DIAGNOSIS — I10 BENIGN ESSENTIAL HTN: ICD-10-CM

## 2018-05-29 DIAGNOSIS — E78.2 MIXED HYPERLIPIDEMIA: ICD-10-CM

## 2018-05-29 DIAGNOSIS — K44.9 HIATAL HERNIA: Primary | ICD-10-CM

## 2018-05-29 DIAGNOSIS — K21.9 GASTROESOPHAGEAL REFLUX DISEASE WITHOUT ESOPHAGITIS: ICD-10-CM

## 2018-05-29 PROCEDURE — 99024 POSTOP FOLLOW-UP VISIT: CPT | Performed by: SURGERY

## 2018-05-30 ENCOUNTER — CARE COORDINATION (OUTPATIENT)
Dept: CARE COORDINATION | Age: 68
End: 2018-05-30

## 2018-06-04 ENCOUNTER — HOSPITAL ENCOUNTER (OUTPATIENT)
Dept: PHARMACY | Age: 68
Setting detail: THERAPIES SERIES
Discharge: HOME | End: 2018-06-04
Payer: MEDICARE

## 2018-06-04 LAB
INR BLD: 1.9
PROTIME: 23.3 SECONDS

## 2018-06-04 PROCEDURE — 99211 OFF/OP EST MAY X REQ PHY/QHP: CPT

## 2018-06-04 PROCEDURE — 85610 PROTHROMBIN TIME: CPT

## 2018-06-06 ENCOUNTER — CARE COORDINATION (OUTPATIENT)
Dept: CARE COORDINATION | Age: 68
End: 2018-06-06

## 2018-06-06 DIAGNOSIS — E11.9 TYPE 2 DIABETES MELLITUS WITHOUT COMPLICATION, WITHOUT LONG-TERM CURRENT USE OF INSULIN (HCC): Primary | ICD-10-CM

## 2018-06-18 ENCOUNTER — HOSPITAL ENCOUNTER (OUTPATIENT)
Dept: PHARMACY | Age: 68
Setting detail: THERAPIES SERIES
Discharge: HOME | End: 2018-06-18
Payer: MEDICARE

## 2018-06-18 ENCOUNTER — HOSPITAL ENCOUNTER (OUTPATIENT)
Age: 68
Discharge: HOME | End: 2018-06-18
Payer: MEDICARE

## 2018-06-18 DIAGNOSIS — N18.2 CKD (CHRONIC KIDNEY DISEASE), STAGE II: ICD-10-CM

## 2018-06-18 DIAGNOSIS — I82.433 ACUTE DEEP VEIN THROMBOSIS (DVT) OF POPLITEAL VEIN OF BOTH LOWER EXTREMITIES (HCC): ICD-10-CM

## 2018-06-18 LAB
ABSOLUTE EOS #: 0.2 K/UL (ref 0–0.4)
ABSOLUTE IMMATURE GRANULOCYTE: ABNORMAL K/UL (ref 0–0.3)
ABSOLUTE LYMPH #: 1.7 K/UL (ref 1–4.8)
ABSOLUTE MONO #: 0.8 K/UL (ref 0.1–1.3)
ANION GAP SERPL CALCULATED.3IONS-SCNC: 15 MMOL/L (ref 9–17)
BASOPHILS # BLD: 1 % (ref 0–2)
BASOPHILS ABSOLUTE: 0.1 K/UL (ref 0–0.2)
BUN BLDV-MCNC: 20 MG/DL (ref 8–23)
BUN/CREAT BLD: ABNORMAL (ref 9–20)
CALCIUM SERPL-MCNC: 9.7 MG/DL (ref 8.6–10.4)
CHLORIDE BLD-SCNC: 102 MMOL/L (ref 98–107)
CO2: 25 MMOL/L (ref 20–31)
CREAT SERPL-MCNC: 0.74 MG/DL (ref 0.5–0.9)
CREATININE URINE: 128.5 MG/DL (ref 28–217)
DIFFERENTIAL TYPE: ABNORMAL
EOSINOPHILS RELATIVE PERCENT: 2 % (ref 0–4)
GFR AFRICAN AMERICAN: >60 ML/MIN
GFR NON-AFRICAN AMERICAN: >60 ML/MIN
GFR SERPL CREATININE-BSD FRML MDRD: ABNORMAL ML/MIN/{1.73_M2}
GFR SERPL CREATININE-BSD FRML MDRD: ABNORMAL ML/MIN/{1.73_M2}
GLUCOSE BLD-MCNC: 195 MG/DL (ref 70–99)
HCT VFR BLD CALC: 38.8 % (ref 36–46)
HEMOGLOBIN: 12.5 G/DL (ref 12–16)
IMMATURE GRANULOCYTES: ABNORMAL %
INR BLD: 2.2
LYMPHOCYTES # BLD: 16 % (ref 24–44)
MCH RBC QN AUTO: 27.1 PG (ref 26–34)
MCHC RBC AUTO-ENTMCNC: 32.1 G/DL (ref 31–37)
MCV RBC AUTO: 84.3 FL (ref 80–100)
MONOCYTES # BLD: 8 % (ref 1–7)
NRBC AUTOMATED: ABNORMAL PER 100 WBC
PDW BLD-RTO: 18.4 % (ref 11.5–14.9)
PLATELET # BLD: 376 K/UL (ref 150–450)
PLATELET ESTIMATE: ABNORMAL
PMV BLD AUTO: 8.2 FL (ref 6–12)
POTASSIUM SERPL-SCNC: 3.8 MMOL/L (ref 3.7–5.3)
PROTIME: 26.4 SECONDS
RBC # BLD: 4.61 M/UL (ref 4–5.2)
RBC # BLD: ABNORMAL 10*6/UL
SEG NEUTROPHILS: 73 % (ref 36–66)
SEGMENTED NEUTROPHILS ABSOLUTE COUNT: 7.5 K/UL (ref 1.3–9.1)
SODIUM BLD-SCNC: 142 MMOL/L (ref 135–144)
TOTAL PROTEIN, URINE: 16 MG/DL
WBC # BLD: 10.3 K/UL (ref 3.5–11)
WBC # BLD: ABNORMAL 10*3/UL

## 2018-06-18 PROCEDURE — 85025 COMPLETE CBC W/AUTO DIFF WBC: CPT

## 2018-06-18 PROCEDURE — 99211 OFF/OP EST MAY X REQ PHY/QHP: CPT

## 2018-06-18 PROCEDURE — 80048 BASIC METABOLIC PNL TOTAL CA: CPT

## 2018-06-18 PROCEDURE — 36415 COLL VENOUS BLD VENIPUNCTURE: CPT

## 2018-06-18 PROCEDURE — 85610 PROTHROMBIN TIME: CPT

## 2018-06-18 PROCEDURE — 82570 ASSAY OF URINE CREATININE: CPT

## 2018-06-18 PROCEDURE — 84156 ASSAY OF PROTEIN URINE: CPT

## 2018-06-22 DIAGNOSIS — F41.9 ANXIETY: ICD-10-CM

## 2018-06-22 RX ORDER — LORAZEPAM 0.5 MG/1
TABLET ORAL
Qty: 60 TABLET | Refills: 0 | Status: SHIPPED | OUTPATIENT
Start: 2018-06-22 | End: 2018-07-21 | Stop reason: SDUPTHER

## 2018-06-26 ENCOUNTER — OFFICE VISIT (OUTPATIENT)
Dept: PODIATRY | Age: 68
End: 2018-06-26
Payer: MEDICARE

## 2018-06-26 DIAGNOSIS — D23.72 BENIGN NEOPLASM OF SKIN OF LOWER LIMB, INCLUDING HIP, LEFT: ICD-10-CM

## 2018-06-26 DIAGNOSIS — M79.671 PAIN IN BOTH FEET: ICD-10-CM

## 2018-06-26 DIAGNOSIS — E11.51 TYPE II DIABETES MELLITUS WITH PERIPHERAL CIRCULATORY DISORDER (HCC): ICD-10-CM

## 2018-06-26 DIAGNOSIS — B35.1 DERMATOPHYTOSIS OF NAIL: Primary | ICD-10-CM

## 2018-06-26 DIAGNOSIS — D23.71 BENIGN NEOPLASM OF SKIN OF LOWER LIMB, INCLUDING HIP, RIGHT: ICD-10-CM

## 2018-06-26 DIAGNOSIS — M79.672 PAIN IN BOTH FEET: ICD-10-CM

## 2018-06-26 DIAGNOSIS — I73.9 PVD (PERIPHERAL VASCULAR DISEASE) (HCC): ICD-10-CM

## 2018-06-26 PROCEDURE — 3014F SCREEN MAMMO DOC REV: CPT | Performed by: PODIATRIST

## 2018-06-26 PROCEDURE — 17110 DESTRUCTION B9 LES UP TO 14: CPT | Performed by: PODIATRIST

## 2018-06-26 PROCEDURE — 1123F ACP DISCUSS/DSCN MKR DOCD: CPT | Performed by: PODIATRIST

## 2018-06-26 PROCEDURE — 3044F HG A1C LEVEL LT 7.0%: CPT | Performed by: PODIATRIST

## 2018-06-26 PROCEDURE — 3017F COLORECTAL CA SCREEN DOC REV: CPT | Performed by: PODIATRIST

## 2018-06-26 PROCEDURE — 11721 DEBRIDE NAIL 6 OR MORE: CPT | Performed by: PODIATRIST

## 2018-06-26 PROCEDURE — G8428 CUR MEDS NOT DOCUMENT: HCPCS | Performed by: PODIATRIST

## 2018-06-26 PROCEDURE — 2022F DILAT RTA XM EVC RTNOPTHY: CPT | Performed by: PODIATRIST

## 2018-06-26 PROCEDURE — G8417 CALC BMI ABV UP PARAM F/U: HCPCS | Performed by: PODIATRIST

## 2018-06-26 PROCEDURE — G8400 PT W/DXA NO RESULTS DOC: HCPCS | Performed by: PODIATRIST

## 2018-06-26 PROCEDURE — 1036F TOBACCO NON-USER: CPT | Performed by: PODIATRIST

## 2018-06-26 PROCEDURE — 1090F PRES/ABSN URINE INCON ASSESS: CPT | Performed by: PODIATRIST

## 2018-06-26 PROCEDURE — 4040F PNEUMOC VAC/ADMIN/RCVD: CPT | Performed by: PODIATRIST

## 2018-06-26 PROCEDURE — 99213 OFFICE O/P EST LOW 20 MIN: CPT | Performed by: PODIATRIST

## 2018-06-27 ENCOUNTER — CARE COORDINATION (OUTPATIENT)
Dept: CARE COORDINATION | Age: 68
End: 2018-06-27

## 2018-07-13 ENCOUNTER — CARE COORDINATION (OUTPATIENT)
Dept: CARE COORDINATION | Age: 68
End: 2018-07-13

## 2018-07-13 ENCOUNTER — TELEPHONE (OUTPATIENT)
Dept: PRIMARY CARE CLINIC | Age: 68
End: 2018-07-13

## 2018-07-13 NOTE — TELEPHONE ENCOUNTER
08/14/2017   ANNABELLA Once 08/14/2017   Anti-Neutrophilic Cytoplasmic Antibody Once 08/14/2017   Rheumatoid Factor Once 08/14/2017   [place]/Lambda Free Lt Chains, Serum Quant Once 08/14/2017   Creatinine, Random Urine Once 08/14/2017   Urinalysis Once 08/14/2017   Protein Electrophoresis, Urine Once 08/14/2017   US Renal Complete Once 08/14/2017   Bladder scan Once 08/14/2017   Urine Culture Once 65/59/4858   Basic Metabolic Panel Every 12 Weeks 9/7/2018       Next Visit Date:  Future Appointments  Date Time Provider Rachael Garsia   7/16/2018 8:45 AM [facility]ICATION [facility]   7/30/2018 9:20 AM RANDY Shea - CNP [facility]   8/28/2018 9:30 AM Abran Kayser, DPM [facility]   9/24/2018 10:10 AM James Sanchez MD [facility] None            Patient Active Problem List:     Mixed hyperlipidemia     Essential (primary) hypertension     Anxiety     Gastroesophageal reflux 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     Coagulopathy (HCC)     DVT of popliteal vein (HCC)     Benign essential HTN     Failure to wean from mechanical ventilation (HCC)     Acid reflux     History of repair of hip joint     Osteoarthritis     Respiratory failure (HCC)     Decreased muscle tone     Scar condition and fibrosis of skin     Lumbar canal stenosis     Type 2 diabetes mellitus with hyperglycemia, without long-term current use of insulin (HCC)     Pneumonia     Sepsis (HCC)     Constipation     BLANE (acute kidney injury) ([address]     Hx of pulmonary embolus     Moderate malnutrition ([address]     Pneumonia due to organism     Supratherapeutic INR     Hiatal hernia

## 2018-07-16 ENCOUNTER — HOSPITAL ENCOUNTER (OUTPATIENT)
Dept: PHARMACY | Age: 68
Setting detail: THERAPIES SERIES
Discharge: HOME | End: 2018-07-16
Payer: MEDICARE

## 2018-07-16 DIAGNOSIS — I26.99 OTHER PULMONARY EMBOLISM WITHOUT ACUTE COR PULMONALE, UNSPECIFIED CHRONICITY (HCC): ICD-10-CM

## 2018-07-16 DIAGNOSIS — I82.433 ACUTE DEEP VEIN THROMBOSIS (DVT) OF POPLITEAL VEIN OF BOTH LOWER EXTREMITIES (HCC): ICD-10-CM

## 2018-07-16 LAB
INR BLD: 2.1
PROTIME: 24.8 SECONDS

## 2018-07-16 PROCEDURE — 99211 OFF/OP EST MAY X REQ PHY/QHP: CPT

## 2018-07-16 PROCEDURE — 85610 PROTHROMBIN TIME: CPT

## 2018-07-16 NOTE — PROGRESS NOTES
Patient states compliant all of the time with regimen. No bleeding or thromboembolic side effects noted. No significant med or dietary changes. Patient has a more normal salad intake. No significant recent illness or disease state changes. PT/INR done in office per protocol. INR was therapeutic at 2.1 (Goal 2-3). Warfarin regimen will be continued at current dose of 1.5mg on Thursdays and 3mg 6x weekly. Will retest in 4 weeks. Patient understands dosing directions and information discussed. Dosing schedule and follow up appointment given to patient. Progress note routed to referring physicians office.

## 2018-07-21 DIAGNOSIS — F41.9 ANXIETY: ICD-10-CM

## 2018-07-21 DIAGNOSIS — N18.30 TYPE 2 DIABETES MELLITUS WITH STAGE 3 CHRONIC KIDNEY DISEASE, WITHOUT LONG-TERM CURRENT USE OF INSULIN (HCC): ICD-10-CM

## 2018-07-21 DIAGNOSIS — E11.22 TYPE 2 DIABETES MELLITUS WITH STAGE 3 CHRONIC KIDNEY DISEASE, WITHOUT LONG-TERM CURRENT USE OF INSULIN (HCC): ICD-10-CM

## 2018-07-23 RX ORDER — LORAZEPAM 0.5 MG/1
TABLET ORAL
Qty: 60 TABLET | Refills: 0 | Status: SHIPPED | OUTPATIENT
Start: 2018-07-23 | End: 2018-08-27 | Stop reason: SDUPTHER

## 2018-07-23 RX ORDER — BLOOD SUGAR DIAGNOSTIC
STRIP MISCELLANEOUS
Qty: 100 STRIP | Refills: 11 | Status: SHIPPED | OUTPATIENT
Start: 2018-07-23 | End: 2018-07-24 | Stop reason: SDUPTHER

## 2018-07-23 RX ORDER — LANCETS
EACH MISCELLANEOUS
Qty: 100 EACH | Refills: 3 | Status: SHIPPED | OUTPATIENT
Start: 2018-07-23 | End: 2018-07-24 | Stop reason: SDUPTHER

## 2018-07-23 NOTE — TELEPHONE ENCOUNTER
08/14/2017   Protein Electrophoresis, Urine Once 08/14/2017   US Renal Complete Once 08/14/2017   Bladder scan Once 08/14/2017   Urine Culture Once 47/83/0899   Basic Metabolic Panel Every 12 Weeks 9/7/2018       Next Visit Date:  Future Appointments  Date Time Provider Rachael Sun   7/30/2018 9:20 AM RANDY Gilmore - CNP [facility]   8/13/2018 8:45 AM [facility] MEDICATION [facility]   8/28/2018 9:30 AM Marylou Denny DPM [facility]   9/24/2018 10:10 AM Arthur Damian MD [facility] None            Patient Active Problem List:     Mixed hyperlipidemia     Essential (primary) hypertension     Anxiety     Gastroesophageal reflux 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     Coagulopathy (HCC)     DVT of popliteal vein (HCC)     Benign essential HTN     Failure to wean from mechanical ventilation (HCC)     Acid reflux     History of repair of hip joint     Osteoarthritis     Respiratory failure (HCC)     Decreased muscle tone     Scar condition and fibrosis of skin     Lumbar canal stenosis     Type 2 diabetes mellitus with hyperglycemia, without long-term current use of insulin (HCC)     Pneumonia     Sepsis (HCC)     Constipation     BLANE (acute kidney injury) ([address]     Hx of pulmonary embolus     Moderate malnutrition (HCC)     Pneumonia due to organism     Supratherapeutic INR     Hiatal hernia

## 2018-07-24 DIAGNOSIS — N18.30 TYPE 2 DIABETES MELLITUS WITH STAGE 3 CHRONIC KIDNEY DISEASE, WITHOUT LONG-TERM CURRENT USE OF INSULIN (HCC): ICD-10-CM

## 2018-07-24 DIAGNOSIS — E11.22 TYPE 2 DIABETES MELLITUS WITH STAGE 3 CHRONIC KIDNEY DISEASE, WITHOUT LONG-TERM CURRENT USE OF INSULIN (HCC): ICD-10-CM

## 2018-07-24 RX ORDER — LANCETS
EACH MISCELLANEOUS
Qty: 100 EACH | Refills: 1 | Status: SHIPPED | OUTPATIENT
Start: 2018-07-24 | End: 2020-07-29

## 2018-07-27 ENCOUNTER — CARE COORDINATION (OUTPATIENT)
Dept: CARE COORDINATION | Age: 68
End: 2018-07-27

## 2018-07-30 ENCOUNTER — CARE COORDINATION (OUTPATIENT)
Dept: CARE COORDINATION | Age: 68
End: 2018-07-30

## 2018-07-30 ENCOUNTER — OFFICE VISIT (OUTPATIENT)
Dept: PRIMARY CARE CLINIC | Age: 68
End: 2018-07-30
Payer: MEDICARE

## 2018-07-30 VITALS
DIASTOLIC BLOOD PRESSURE: 84 MMHG | BODY MASS INDEX: 29.06 KG/M2 | OXYGEN SATURATION: 97 % | WEIGHT: 164 LBS | HEIGHT: 63 IN | RESPIRATION RATE: 16 BRPM | SYSTOLIC BLOOD PRESSURE: 136 MMHG | HEART RATE: 79 BPM

## 2018-07-30 DIAGNOSIS — Z99.11: ICD-10-CM

## 2018-07-30 DIAGNOSIS — E11.65 TYPE 2 DIABETES MELLITUS WITH HYPERGLYCEMIA, WITHOUT LONG-TERM CURRENT USE OF INSULIN (HCC): Primary | ICD-10-CM

## 2018-07-30 LAB — HBA1C MFR BLD: 6.9 %

## 2018-07-30 PROCEDURE — 1123F ACP DISCUSS/DSCN MKR DOCD: CPT | Performed by: NURSE PRACTITIONER

## 2018-07-30 PROCEDURE — 3017F COLORECTAL CA SCREEN DOC REV: CPT | Performed by: NURSE PRACTITIONER

## 2018-07-30 PROCEDURE — G8417 CALC BMI ABV UP PARAM F/U: HCPCS | Performed by: NURSE PRACTITIONER

## 2018-07-30 PROCEDURE — 99214 OFFICE O/P EST MOD 30 MIN: CPT | Performed by: NURSE PRACTITIONER

## 2018-07-30 PROCEDURE — G8400 PT W/DXA NO RESULTS DOC: HCPCS | Performed by: NURSE PRACTITIONER

## 2018-07-30 PROCEDURE — 4040F PNEUMOC VAC/ADMIN/RCVD: CPT | Performed by: NURSE PRACTITIONER

## 2018-07-30 PROCEDURE — 1101F PT FALLS ASSESS-DOCD LE1/YR: CPT | Performed by: NURSE PRACTITIONER

## 2018-07-30 PROCEDURE — 2022F DILAT RTA XM EVC RTNOPTHY: CPT | Performed by: NURSE PRACTITIONER

## 2018-07-30 PROCEDURE — 3014F SCREEN MAMMO DOC REV: CPT | Performed by: NURSE PRACTITIONER

## 2018-07-30 PROCEDURE — 1036F TOBACCO NON-USER: CPT | Performed by: NURSE PRACTITIONER

## 2018-07-30 PROCEDURE — 83036 HEMOGLOBIN GLYCOSYLATED A1C: CPT | Performed by: NURSE PRACTITIONER

## 2018-07-30 PROCEDURE — G8427 DOCREV CUR MEDS BY ELIG CLIN: HCPCS | Performed by: NURSE PRACTITIONER

## 2018-07-30 PROCEDURE — 3044F HG A1C LEVEL LT 7.0%: CPT | Performed by: NURSE PRACTITIONER

## 2018-07-30 PROCEDURE — 1090F PRES/ABSN URINE INCON ASSESS: CPT | Performed by: NURSE PRACTITIONER

## 2018-07-30 ASSESSMENT — PATIENT HEALTH QUESTIONNAIRE - PHQ9
2. FEELING DOWN, DEPRESSED OR HOPELESS: 0
SUM OF ALL RESPONSES TO PHQ9 QUESTIONS 1 & 2: 0
1. LITTLE INTEREST OR PLEASURE IN DOING THINGS: 0
SUM OF ALL RESPONSES TO PHQ QUESTIONS 1-9: 0

## 2018-07-30 ASSESSMENT — ENCOUNTER SYMPTOMS
SHORTNESS OF BREATH: 0
COUGH: 0
BACK PAIN: 0
ABDOMINAL PAIN: 0

## 2018-07-30 NOTE — CARE COORDINATION
USE TO CHECK GLUCOSE TWICE DAILY.  Dx: E11.65 Type 2 DM with hyperglycemia 7/24/18   RANDY Merritt CNP   metFORMIN (GLUCOPHAGE) 500 MG tablet Take 1 tablet by mouth 2 times daily (with meals) 7/24/18   RANDY Merritt CNP   LORazepam (ATIVAN) 0.5 MG tablet TAKE 1 TABLET BY MOUTH EVERY 12 HOURS AS NEEDED FOR ANXIETY 7/23/18 8/22/18  RANDY Merritt CNP   Biotin 5000 MCG CAPS Take 1 capsule by mouth daily    Historical Provider, MD   enoxaparin (LOVENOX) 80 MG/0.8ML injection Inject 70 mg into the skin daily    Historical Provider, MD   ondansetron (ZOFRAN) 4 MG tablet Take 1 tablet by mouth every 6 hours as needed for Nausea or Vomiting 5/21/18   Muna Corrigan DO   lisinopril-hydrochlorothiazide (PRINZIDE;ZESTORETIC) 20-25 MG per tablet Take 1 tablet by mouth daily 4/30/18   RANDY Merritt CNP   omeprazole (PRILOSEC) 20 MG delayed release capsule Take 1 capsule by mouth Daily 3/19/18   RANDY Merritt CNP   warfarin (COUMADIN) 3 MG tablet Take 3 mg by mouth Six times weekly Managed by [facility] Anticoagulation Service: 1.5 mg Thursdays and 3 mg 6x weekly    Historical Provider, MD   ferrous sulfate 325 (65 Fe) MG tablet Take 1 tablet by mouth daily (with breakfast) 2/20/18   Hazel Dubon MD   acetaminophen (TYLENOL ARTHRITIS PAIN) 650 MG extended release tablet Take 1,300 mg by mouth nightly     Historical Provider, MD   docusate sodium (COLACE) 100 MG capsule Take 1 capsule by mouth 2 times daily  Patient taking differently: Take 100 mg by mouth 2 times daily as needed  2/12/18   RANDY Merritt CNP   rOPINIRole (REQUIP) 0.5 MG tablet Take 1 tablet by mouth 3 times daily  Patient taking differently: Take 0.5 mg by mouth 3 times daily as needed  2/1/18   RANDY Merritt CNP   atorvastatin (LIPITOR) 20 MG tablet Take 1 tablet by mouth daily  Patient taking differently: Take 20 mg by mouth nightly  8/7/17   Cortney Anger

## 2018-07-30 NOTE — PROGRESS NOTES
Inject 70 mg into the skin daily      ondansetron (ZOFRAN) 4 MG tablet Take 1 tablet by mouth every 6 hours as needed for Nausea or Vomiting 20 tablet 0    lisinopril-hydrochlorothiazide (PRINZIDE;ZESTORETIC) 20-25 MG per tablet Take 1 tablet by mouth daily 90 tablet 3    omeprazole (PRILOSEC) 20 MG delayed release capsule Take 1 capsule by mouth Daily 90 capsule 3    warfarin (COUMADIN) 3 MG tablet Take 3 mg by mouth Six times weekly Managed by [facility] Anticoagulation Service: 1.5 mg Thursdays and 3 mg 6x weekly      ferrous sulfate 325 (65 Fe) MG tablet Take 1 tablet by mouth daily (with breakfast) 30 tablet 0    acetaminophen (TYLENOL ARTHRITIS PAIN) 650 MG extended release tablet Take 1,300 mg by mouth nightly       docusate sodium (COLACE) 100 MG capsule Take 1 capsule by mouth 2 times daily (Patient taking differently: Take 100 mg by mouth 2 times daily as needed ) 30 capsule 0    rOPINIRole (REQUIP) 0.5 MG tablet Take 1 tablet by mouth 3 times daily (Patient taking differently: Take 0.5 mg by mouth 3 times daily as needed ) 90 tablet 3    atorvastatin (LIPITOR) 20 MG tablet Take 1 tablet by mouth daily (Patient taking differently: Take 20 mg by mouth nightly ) 90 tablet 3    Alcohol Swabs PADS Need to use  twice a day 100 each 3    Blood Glucose Monitoring Suppl FLAVIA Glucometer Accu check. Test Blood Sugars 2 Times Daily. Dx: E 11.22, N 18.3 type II DM 1 Device 0     No current facility-administered medications for this visit.       No Known Allergies    Health Maintenance   Topic Date Due    DTaP/Tdap/Td vaccine (1 - Tdap) 09/28/1968    Shingles Vaccine (1 of 2 - 2 Dose Series) 09/28/1999    Colon cancer screen colonoscopy  09/28/1999    DEXA (modify frequency per FRAX score)  09/28/2014    Pneumococcal low/med risk (1 of 2 - PCV13) 09/28/2014    Flu vaccine (1) 09/01/2018    Lipid screen  11/13/2018    Diabetic retinal exam  12/11/2018    Diabetic microalbuminuria test Return in about 3 months (around 10/30/2018) for Diabetes, anxiety. 1. DM- Stable. Hga1c 6.9. Continue current meds. Follow up in 3 months for recheck. 2. Anxiety- Stable. Continue current meds. Follow up in 3 months for recheck. Of the 25 minute duration appointment visit, Paris SOLIS-BC spent at least 50% of the face-to-face time in counseling, explanation of diagnosis, planning of further management, and answering all questions. Orders Placed This Encounter   Procedures    POCT glycosylated hemoglobin (Hb A1C)         Patient given educational materials - see patient instructions. Discussed use, benefit, and side effects of prescribed medications. All patient questions answered. Pt voiced understanding. Reviewed health maintenance. Instructed to continue current medications, diet and exercise. Patient agreed with treatment plan. Follow up as directed.       Electronically signed by RANDY Richardson CNP on 7/30/2018 at 9:39 AM

## 2018-08-13 ENCOUNTER — HOSPITAL ENCOUNTER (OUTPATIENT)
Dept: PHARMACY | Age: 68
Setting detail: THERAPIES SERIES
Discharge: HOME | End: 2018-08-13
Payer: MEDICARE

## 2018-08-13 LAB
INR BLD: 2.2
PROTIME: 26.4 SECONDS

## 2018-08-13 PROCEDURE — 99211 OFF/OP EST MAY X REQ PHY/QHP: CPT

## 2018-08-13 PROCEDURE — 85610 PROTHROMBIN TIME: CPT

## 2018-08-14 RX ORDER — ATORVASTATIN CALCIUM 20 MG/1
20 TABLET, FILM COATED ORAL DAILY
Qty: 90 TABLET | Refills: 1 | Status: SHIPPED | OUTPATIENT
Start: 2018-08-14 | End: 2019-02-05 | Stop reason: SDUPTHER

## 2018-08-14 NOTE — TELEPHONE ENCOUNTER
08/14/2017   Urinalysis Once 08/14/2017   Protein Electrophoresis, Urine Once 08/14/2017   US Renal Complete Once 08/14/2017   Bladder scan Once 08/14/2017   Urine Culture Once 31/12/2036   Basic Metabolic Panel Every 12 Weeks 9/7/2018       Next Visit Date:  Future Appointments  Date Time Provider Rachael oSli   8/28/2018 9:30 AM Luis M Zepeda DPM [facility]   9/10/2018 8:30 AM [facility] MEDICATION [facility]   9/24/2018 10:10 AM Karon Douglas MD [facility] None   11/12/2018 9:40 AM RANDY Talbert - CNP [facility]            Patient Active Problem List:     Mixed hyperlipidemia     Essential (primary) hypertension     Anxiety     Gastroesophageal reflux 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     Coagulopathy (HCC)     DVT of popliteal vein (HCC)     Benign essential HTN     Failure to wean from mechanical ventilation (HCC)     Acid reflux     History of repair of hip joint     Osteoarthritis     Respiratory failure (HCC)     Decreased muscle tone     Scar condition and fibrosis of skin     Lumbar canal stenosis     Type 2 diabetes mellitus with hyperglycemia, without long-term current use of insulin (HCC)     Pneumonia     Sepsis (HCC)     Constipation     BLANE (acute kidney injury) ([address]     Hx of pulmonary embolus     Moderate malnutrition ([address]     Pneumonia due to organism     Supratherapeutic INR     Hiatal hernia

## 2018-08-21 ENCOUNTER — CARE COORDINATION (OUTPATIENT)
Dept: CARE COORDINATION | Age: 68
End: 2018-08-21

## 2018-08-21 NOTE — CARE COORDINATION
Attempt to contact patient today regarding care coordination, unable to reach. Recent encounters and notes reviewed.         Dez Younger RN

## 2018-08-27 DIAGNOSIS — F41.9 ANXIETY: ICD-10-CM

## 2018-08-27 RX ORDER — LORAZEPAM 0.5 MG/1
TABLET ORAL
Qty: 60 TABLET | Refills: 2 | Status: SHIPPED | OUTPATIENT
Start: 2018-08-27 | End: 2018-11-21 | Stop reason: SDUPTHER

## 2018-08-27 NOTE — TELEPHONE ENCOUNTER
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     Coagulopathy (HCC)     DVT of popliteal vein (HCC)     Benign essential HTN     Failure to wean from mechanical ventilation (HCC)     Acid reflux     History of repair of hip joint     Osteoarthritis     Respiratory failure (HCC)     Decreased muscle tone     Scar condition and fibrosis of skin     Lumbar canal stenosis     Type 2 diabetes mellitus with hyperglycemia, without long-term current use of insulin (HCC)     Pneumonia     Sepsis (HCC)     Constipation     BLANE (acute kidney injury) ([address]     Hx of pulmonary embolus     Moderate malnutrition ([address]     Pneumonia due to organism     Supratherapeutic INR     Hiatal hernia

## 2018-08-31 ENCOUNTER — CARE COORDINATION (OUTPATIENT)
Dept: CARE COORDINATION | Age: 68
End: 2018-08-31

## 2018-09-04 ENCOUNTER — OFFICE VISIT (OUTPATIENT)
Dept: PODIATRY | Age: 68
End: 2018-09-04
Payer: MEDICARE

## 2018-09-04 VITALS
HEIGHT: 63 IN | DIASTOLIC BLOOD PRESSURE: 74 MMHG | HEART RATE: 82 BPM | WEIGHT: 164 LBS | SYSTOLIC BLOOD PRESSURE: 130 MMHG | RESPIRATION RATE: 18 BRPM | BODY MASS INDEX: 29.06 KG/M2

## 2018-09-04 DIAGNOSIS — D23.72 BENIGN NEOPLASM OF SKIN OF LEFT FOOT: ICD-10-CM

## 2018-09-04 DIAGNOSIS — M79.605 PAIN IN BOTH LOWER EXTREMITIES: ICD-10-CM

## 2018-09-04 DIAGNOSIS — M79.604 PAIN IN BOTH LOWER EXTREMITIES: ICD-10-CM

## 2018-09-04 DIAGNOSIS — E11.51 TYPE II DIABETES MELLITUS WITH PERIPHERAL CIRCULATORY DISORDER (HCC): Primary | ICD-10-CM

## 2018-09-04 DIAGNOSIS — D23.71 BENIGN NEOPLASM OF SKIN OF RIGHT FOOT: ICD-10-CM

## 2018-09-04 DIAGNOSIS — R26.2 DIFFICULTY WALKING: ICD-10-CM

## 2018-09-04 DIAGNOSIS — B35.1 DERMATOPHYTOSIS OF NAIL: ICD-10-CM

## 2018-09-04 DIAGNOSIS — I73.9 PERIPHERAL VASCULAR DISEASE, UNSPECIFIED (HCC): ICD-10-CM

## 2018-09-04 PROCEDURE — 99213 OFFICE O/P EST LOW 20 MIN: CPT | Performed by: PODIATRIST

## 2018-09-04 PROCEDURE — 1101F PT FALLS ASSESS-DOCD LE1/YR: CPT | Performed by: PODIATRIST

## 2018-09-04 PROCEDURE — 3014F SCREEN MAMMO DOC REV: CPT | Performed by: PODIATRIST

## 2018-09-04 PROCEDURE — 4040F PNEUMOC VAC/ADMIN/RCVD: CPT | Performed by: PODIATRIST

## 2018-09-04 PROCEDURE — 3044F HG A1C LEVEL LT 7.0%: CPT | Performed by: PODIATRIST

## 2018-09-04 PROCEDURE — 3017F COLORECTAL CA SCREEN DOC REV: CPT | Performed by: PODIATRIST

## 2018-09-04 PROCEDURE — 1036F TOBACCO NON-USER: CPT | Performed by: PODIATRIST

## 2018-09-04 PROCEDURE — G8400 PT W/DXA NO RESULTS DOC: HCPCS | Performed by: PODIATRIST

## 2018-09-04 PROCEDURE — G8427 DOCREV CUR MEDS BY ELIG CLIN: HCPCS | Performed by: PODIATRIST

## 2018-09-04 PROCEDURE — 1123F ACP DISCUSS/DSCN MKR DOCD: CPT | Performed by: PODIATRIST

## 2018-09-04 PROCEDURE — 1090F PRES/ABSN URINE INCON ASSESS: CPT | Performed by: PODIATRIST

## 2018-09-04 PROCEDURE — 11721 DEBRIDE NAIL 6 OR MORE: CPT | Performed by: PODIATRIST

## 2018-09-04 PROCEDURE — G8417 CALC BMI ABV UP PARAM F/U: HCPCS | Performed by: PODIATRIST

## 2018-09-04 PROCEDURE — 17110 DESTRUCTION B9 LES UP TO 14: CPT | Performed by: PODIATRIST

## 2018-09-04 PROCEDURE — 2022F DILAT RTA XM EVC RTNOPTHY: CPT | Performed by: PODIATRIST

## 2018-09-04 NOTE — PROGRESS NOTES
([facility]  48283 - MD DEBRIDEMENT OF NAILS, 6 OR MORE    HM DIABETES FOOT EXAM    64577 - MD DESTRUCTION BENIGN LESIONS UP TO 14   2. Dermatophytosis of nail  00085 - MD DEBRIDEMENT OF NAILS, 6 OR MORE    HM DIABETES FOOT EXAM    43759 - MD DESTRUCTION BENIGN LESIONS UP TO 14   3. Peripheral vascular disease, unspecified ([facility]  34705 - MD DEBRIDEMENT OF NAILS, 6 OR MORE    HM DIABETES FOOT EXAM    50264 - MD DESTRUCTION BENIGN LESIONS UP TO 14   4. Pain in both lower extremities  38427 - MD DEBRIDEMENT OF NAILS, 6 OR MORE    HM DIABETES FOOT EXAM    55718 - MD DESTRUCTION BENIGN LESIONS UP TO 14   5. Difficulty walking  60696 - MD DEBRIDEMENT OF NAILS, 6 OR MORE    HM DIABETES FOOT EXAM    93951 - MD DESTRUCTION BENIGN LESIONS UP TO 14   6. Benign neoplasm of skin of right foot  75474 - MD DESTRUCTION BENIGN LESIONS UP TO 14   7. Benign neoplasm of skin of left foot  29668 - MD DESTRUCTION BENIGN LESIONS UP TO 14           Q7   []Yes    []No                Q8   [x]Yes    []No                     Q9   []Yes    []No    Plan:   Pt was evaluated and examined. Patient was given personalized discharge instructions. The lesion was partially excised and Pyrogallic acid was applied under occlusion. The patient will leave in place for 24-48 hours and than remove. The patient tolerated the procedure well and without complication. Nails 1-10 were debrided sharply in length and thickness with a nipper and , without incident. Pt will follow up in 9 weeks or sooner if any problems arise. Diagnosis was discussed with the pt and all of their questions were answered in detail. Proper foot hygiene and care was discussed with the pt. Informed patient on proper diabetic foot care and importance of tight glycemic control. Patient to check feet daily and contact the office with any questions/problems/concerns.    Other comorbidity noted and will be managed by PCP.  9/4/2018    Electronically signed by Carol Vizcaino DPM on 9/4/2018 at 9:07 AM  9/4/2018

## 2018-09-10 ENCOUNTER — HOSPITAL ENCOUNTER (OUTPATIENT)
Dept: PHARMACY | Age: 68
Setting detail: THERAPIES SERIES
Discharge: HOME | End: 2018-09-10
Payer: MEDICARE

## 2018-09-10 LAB
INR BLD: 2.1
PROTIME: 25.1 SECONDS

## 2018-09-10 PROCEDURE — 99211 OFF/OP EST MAY X REQ PHY/QHP: CPT

## 2018-09-10 PROCEDURE — 85610 PROTHROMBIN TIME: CPT

## 2018-09-11 ENCOUNTER — CARE COORDINATION (OUTPATIENT)
Dept: CARE COORDINATION | Age: 68
End: 2018-09-11

## 2018-09-11 NOTE — CARE COORDINATION
CHECK GLUCOSE TWICE DAILY. Dx: E11.65 Type 2 DM with Hyperglycemia 7/24/18  Yes RANDY Maria CNP   ACCU-CHEK SOFTCLIX LANCETS [facility] USE TO CHECK GLUCOSE TWICE DAILY. Dx: E11.65 Type 2 DM with hyperglycemia 7/24/18  Yes RANDY Maria CNP   metFORMIN (GLUCOPHAGE) 500 MG tablet Take 1 tablet by mouth 2 times daily (with meals) 7/24/18  Yes RANDY Maria CNP   Biotin 5000 MCG CAPS Take 1 capsule by mouth daily   Yes Historical Provider, MD   lisinopril-hydrochlorothiazide (PRINZIDE;ZESTORETIC) 20-25 MG per tablet Take 1 tablet by mouth daily 4/30/18  Yes RANDY Maria CNP   omeprazole (PRILOSEC) 20 MG delayed release capsule Take 1 capsule by mouth Daily 3/19/18  Yes RANDY Maria CNP   warfarin (COUMADIN) 3 MG tablet Take 3 mg by mouth Six times weekly Managed by [facility] Anticoagulation Service: 1.5 mg Thursdays and 3 mg 6x weekly   Yes Historical Provider, MD   ferrous sulfate 325 (65 Fe) MG tablet Take 1 tablet by mouth daily (with breakfast) 2/20/18  Yes Manjula Burgess MD   acetaminophen (TYLENOL ARTHRITIS PAIN) 650 MG extended release tablet Take 1,300 mg by mouth nightly    Yes Historical Provider, MD   docusate sodium (COLACE) 100 MG capsule Take 1 capsule by mouth 2 times daily  Patient taking differently: Take 100 mg by mouth 2 times daily as needed  2/12/18  Yes RANDY Maria CNP   rOPINIRole (REQUIP) 0.5 MG tablet Take 1 tablet by mouth 3 times daily  Patient taking differently: Take 0.5 mg by mouth 3 times daily as needed  2/1/18  Yes RANDY Maria CNP   Alcohol Swabs PADS Need to use  twice a day 6/21/17  Yes Jd Leo MD   Blood Glucose Monitoring Suppl FLAVIA Glucometer Accu check. Test Blood Sugars 2 Times Daily.  Dx: E 11.22, N 18.3 type II DM 6/21/17  Yes Jd Leo MD   ondansetron (ZOFRAN) 4 MG tablet Take 1 tablet by mouth every 6 hours as needed for Nausea or Vomiting 5/21/18

## 2018-10-03 ENCOUNTER — CARE COORDINATION (OUTPATIENT)
Dept: CARE COORDINATION | Age: 68
End: 2018-10-03

## 2018-10-08 ENCOUNTER — HOSPITAL ENCOUNTER (OUTPATIENT)
Dept: PHARMACY | Age: 68
Setting detail: THERAPIES SERIES
Discharge: HOME | End: 2018-10-08
Payer: MEDICARE

## 2018-10-08 DIAGNOSIS — I26.99 OTHER PULMONARY EMBOLISM WITHOUT ACUTE COR PULMONALE, UNSPECIFIED CHRONICITY (HCC): ICD-10-CM

## 2018-10-08 DIAGNOSIS — I82.433 ACUTE DEEP VEIN THROMBOSIS (DVT) OF POPLITEAL VEIN OF BOTH LOWER EXTREMITIES (HCC): ICD-10-CM

## 2018-10-08 LAB
INR BLD: 2.2
PROTIME: 26.8 SECONDS

## 2018-10-08 PROCEDURE — 99211 OFF/OP EST MAY X REQ PHY/QHP: CPT

## 2018-10-08 PROCEDURE — 85610 PROTHROMBIN TIME: CPT

## 2018-10-12 ENCOUNTER — CARE COORDINATION (OUTPATIENT)
Dept: CARE COORDINATION | Age: 68
End: 2018-10-12

## 2018-10-12 NOTE — CARE COORDINATION
(PRINZIDE;ZESTORETIC) 20-25 MG per tablet Take 1 tablet by mouth daily 4/30/18   RANDY Muller CNP   omeprazole (PRILOSEC) 20 MG delayed release capsule Take 1 capsule by mouth Daily 3/19/18   RANDY Muller CNP   warfarin (COUMADIN) 3 MG tablet Take 3 mg by mouth Six times weekly Managed by [facility] Anticoagulation Service: 1.5 mg Thursdays and 3 mg 6x weekly    Historical Provider, MD   ferrous sulfate 325 (65 Fe) MG tablet Take 1 tablet by mouth daily (with breakfast) 2/20/18   Sherif Mcneal MD   acetaminophen (TYLENOL ARTHRITIS PAIN) 650 MG extended release tablet Take 1,300 mg by mouth nightly     Historical Provider, MD   docusate sodium (COLACE) 100 MG capsule Take 1 capsule by mouth 2 times daily  Patient taking differently: Take 100 mg by mouth 2 times daily as needed  2/12/18   RANDY Muller CNP   rOPINIRole (REQUIP) 0.5 MG tablet Take 1 tablet by mouth 3 times daily  Patient taking differently: Take 0.5 mg by mouth 3 times daily as needed  2/1/18   RANDY Muller CNP   Alcohol Swabs PADS Need to use  twice a day 6/21/17   Tracy Burleson MD   Blood Glucose Monitoring Suppl FLAVIA Glucometer Accu check. Test Blood Sugars 2 Times Daily.  Dx: E 11.22, N 18.3 type II DM 6/21/17   Tracy Burleson MD       Future Appointments  Date Time Provider Rachael Garsia   10/15/2018 9:00 AM [facility]   11/5/2018 8:30 AM [facility] MEDICATION MGMT [facility]   11/6/2018 9:00 AM ALETHEA Montiel [facility]   11/12/2018 9:40 AM RANDY Muller CNP [facility] [address] [facility]

## 2018-10-15 ENCOUNTER — NURSE ONLY (OUTPATIENT)
Dept: PRIMARY CARE CLINIC | Age: 68
End: 2018-10-15
Payer: MEDICARE

## 2018-10-15 DIAGNOSIS — Z23 NEED FOR INFLUENZA VACCINATION: Primary | ICD-10-CM

## 2018-10-15 DIAGNOSIS — E11.65 TYPE 2 DIABETES MELLITUS WITH HYPERGLYCEMIA, WITHOUT LONG-TERM CURRENT USE OF INSULIN (HCC): ICD-10-CM

## 2018-10-15 PROCEDURE — G0008 ADMIN INFLUENZA VIRUS VAC: HCPCS | Performed by: NURSE PRACTITIONER

## 2018-10-15 PROCEDURE — 90662 IIV NO PRSV INCREASED AG IM: CPT | Performed by: NURSE PRACTITIONER

## 2018-10-15 RX ORDER — LISINOPRIL AND HYDROCHLOROTHIAZIDE 25; 20 MG/1; MG/1
1 TABLET ORAL DAILY
Qty: 90 TABLET | Refills: 3 | Status: SHIPPED | OUTPATIENT
Start: 2018-10-15 | End: 2019-02-22 | Stop reason: ALTCHOICE

## 2018-11-01 ENCOUNTER — CARE COORDINATION (OUTPATIENT)
Dept: CARE COORDINATION | Age: 68
End: 2018-11-01

## 2018-11-05 ENCOUNTER — APPOINTMENT (OUTPATIENT)
Dept: PHARMACY | Age: 68
End: 2018-11-05
Payer: MEDICARE

## 2018-11-09 ENCOUNTER — CARE COORDINATION (OUTPATIENT)
Dept: CARE COORDINATION | Age: 68
End: 2018-11-09

## 2018-11-12 ENCOUNTER — HOSPITAL ENCOUNTER (OUTPATIENT)
Dept: PHARMACY | Age: 68
Setting detail: THERAPIES SERIES
Discharge: HOME | End: 2018-11-12
Payer: MEDICARE

## 2018-11-12 LAB
INR BLD: 2.5
PROTIME: 29.4 SECONDS

## 2018-11-12 PROCEDURE — 85610 PROTHROMBIN TIME: CPT

## 2018-11-12 PROCEDURE — 99211 OFF/OP EST MAY X REQ PHY/QHP: CPT

## 2018-11-21 ENCOUNTER — CARE COORDINATION (OUTPATIENT)
Dept: CARE COORDINATION | Age: 68
End: 2018-11-21

## 2018-11-21 DIAGNOSIS — F41.9 ANXIETY: ICD-10-CM

## 2018-11-21 RX ORDER — LORAZEPAM 0.5 MG/1
TABLET ORAL
Qty: 60 TABLET | Refills: 2 | Status: SHIPPED | OUTPATIENT
Start: 2018-11-21 | End: 2019-02-22 | Stop reason: SDUPTHER

## 2018-11-21 NOTE — TELEPHONE ENCOUNTER
Last OV 07/30/18    Health Maintenance   Topic Date Due    DTaP/Tdap/Td vaccine (1 - Tdap) 09/28/1968    Shingles Vaccine (1 of 2 - 2 Dose Series) 09/28/1999    Colon cancer screen colonoscopy  09/28/1999    DEXA (modify frequency per FRAX score)  09/28/2014    Pneumococcal low/med risk (1 of 2 - PCV13) 09/28/2014    Lipid screen  11/13/2018    Diabetic retinal exam  12/11/2018    Diabetic microalbuminuria test  02/13/2019    Potassium monitoring  06/18/2019    Creatinine monitoring  06/18/2019    A1C test (Diabetic or Prediabetic)  07/30/2019    Breast cancer screen  08/15/2019    Diabetic foot exam  09/04/2019    Flu vaccine  Completed    Hepatitis C screen  Completed             (applicable per patient's age: Cancer Screenings, Depression Screening, Fall Risk Screening, Immunizations)    Hemoglobin A1C (%)   Date Value   07/30/2018 6.9   05/14/2018 6.9 (H)   02/28/2018 5.9     LDL Calculated (mg/dL)   Date Value   11/13/2017 74     AST (U/L)   Date Value   05/14/2018 14     ALT (U/L)   Date Value   05/14/2018 15     BUN (mg/dL)   Date Value   06/18/2018 20      (goal A1C is < 7)   (goal LDL is <100) need 30-50% reduction from baseline     BP Readings from Last 3 Encounters:   09/04/18 130/74   07/30/18 136/84   05/29/18 138/78    (goal /80)      All Future Testing planned in [facility]:  Lab Frequency Next Occurrence   Basic Metabolic Panel         Next Visit Date:  Future Appointments  Date Time Provider Rachael Garsia   12/10/2018 8:30 AM [facility] MEDICATION MGMT [facility]            Patient Active Problem List:     Mixed hyperlipidemia     Essential (primary) hypertension     Anxiety     Gastroesophageal reflux 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

## 2018-12-03 ENCOUNTER — CARE COORDINATION (OUTPATIENT)
Dept: CARE COORDINATION | Age: 68
End: 2018-12-03

## 2018-12-06 ENCOUNTER — OFFICE VISIT (OUTPATIENT)
Dept: PODIATRY | Age: 68
End: 2018-12-06
Payer: MEDICARE

## 2018-12-06 VITALS — HEIGHT: 63 IN | BODY MASS INDEX: 29.06 KG/M2 | WEIGHT: 164 LBS

## 2018-12-06 DIAGNOSIS — B35.1 DERMATOPHYTOSIS OF NAIL: ICD-10-CM

## 2018-12-06 DIAGNOSIS — E11.51 TYPE II DIABETES MELLITUS WITH PERIPHERAL CIRCULATORY DISORDER (HCC): Primary | ICD-10-CM

## 2018-12-06 DIAGNOSIS — M79.605 PAIN IN BOTH LOWER EXTREMITIES: ICD-10-CM

## 2018-12-06 DIAGNOSIS — R60.0 EDEMA OF LOWER EXTREMITY: ICD-10-CM

## 2018-12-06 DIAGNOSIS — D23.71 BENIGN NEOPLASM OF SKIN OF RIGHT FOOT: ICD-10-CM

## 2018-12-06 DIAGNOSIS — M79.604 PAIN IN BOTH LOWER EXTREMITIES: ICD-10-CM

## 2018-12-06 DIAGNOSIS — I73.9 PVD (PERIPHERAL VASCULAR DISEASE) (HCC): ICD-10-CM

## 2018-12-06 PROCEDURE — 1036F TOBACCO NON-USER: CPT | Performed by: PODIATRIST

## 2018-12-06 PROCEDURE — 4040F PNEUMOC VAC/ADMIN/RCVD: CPT | Performed by: PODIATRIST

## 2018-12-06 PROCEDURE — 1090F PRES/ABSN URINE INCON ASSESS: CPT | Performed by: PODIATRIST

## 2018-12-06 PROCEDURE — 3014F SCREEN MAMMO DOC REV: CPT | Performed by: PODIATRIST

## 2018-12-06 PROCEDURE — G8400 PT W/DXA NO RESULTS DOC: HCPCS | Performed by: PODIATRIST

## 2018-12-06 PROCEDURE — 11721 DEBRIDE NAIL 6 OR MORE: CPT | Performed by: PODIATRIST

## 2018-12-06 PROCEDURE — G8417 CALC BMI ABV UP PARAM F/U: HCPCS | Performed by: PODIATRIST

## 2018-12-06 PROCEDURE — 17110 DESTRUCTION B9 LES UP TO 14: CPT | Performed by: PODIATRIST

## 2018-12-06 PROCEDURE — 3017F COLORECTAL CA SCREEN DOC REV: CPT | Performed by: PODIATRIST

## 2018-12-06 PROCEDURE — G8427 DOCREV CUR MEDS BY ELIG CLIN: HCPCS | Performed by: PODIATRIST

## 2018-12-06 PROCEDURE — 2022F DILAT RTA XM EVC RTNOPTHY: CPT | Performed by: PODIATRIST

## 2018-12-06 PROCEDURE — 1123F ACP DISCUSS/DSCN MKR DOCD: CPT | Performed by: PODIATRIST

## 2018-12-06 PROCEDURE — 1101F PT FALLS ASSESS-DOCD LE1/YR: CPT | Performed by: PODIATRIST

## 2018-12-06 PROCEDURE — 99213 OFFICE O/P EST LOW 20 MIN: CPT | Performed by: PODIATRIST

## 2018-12-06 PROCEDURE — 3044F HG A1C LEVEL LT 7.0%: CPT | Performed by: PODIATRIST

## 2018-12-06 PROCEDURE — G8482 FLU IMMUNIZE ORDER/ADMIN: HCPCS | Performed by: PODIATRIST

## 2018-12-06 NOTE — PROGRESS NOTES
[address] [facility] [address] [facility]  [place] [address]  Dept: [phone]    DIABETIC NAIL PROGRESS NOTE  Date of patient's visit: 2018  Patient's Name:  Karyn Ricketts YOB: 1950            Patient Care Team:  RANDY Reed CNP as PCP - General (Nurse Practitioner)  RANDY Reed CNP as PCP - [facility] Attributed Provider  Justyn No MD as Consulting Physician (Nephrology)  Tram Vidal DPM as Consulting Physician (Podiatry)  Fabian Kaminski MD as Consulting Physician (Rheumatology)  Joe Lauren MD as Surgeon (Bariatric Surgery)  Lester Pereira DO as Consulting Physician (Pulmonology)  Babak Milligan DPM as Physician (Podiatry)  Lindsay Palacios RN as Care Coordinator          Chief Complaint   Patient presents with    Diabetes    Nail Problem     pcp is dr Adelina Foster last seen 2018    Circulatory Problem       Subjective:   Karyn Ricketts comes to clinic for Diabetes; Nail Problem (pcp is dr Adelina Foster last seen 2018); and Circulatory Problem    she is a diabetic and states that she has a painful skin lesion on her right foot. Pt currently has complaint of thickened, elongated nails that they cannot manage by themselves. Pt's primary care physician is RANDY Monaco CNP last seen 2018   Pt's last blood sugar was 117 . Lab Results   Component Value Date    LABA1C 6.9 2018      Complains of numbness in the feet bilat. Past Medical History:   Diagnosis Date    Acute respiratory failure ([address]     Anemia     Blood transfusion    Anxiety     Chronic kidney disease     GERD (gastroesophageal reflux disease)     H/O transfusion 2018    Anemia    Hiatal hernia     Hyperlipidemia     Hypertension     on Rx.     Neuropathy     Osteoarthritis     Pulmonary embolism ([address]     on coumadin    RLS (restless legs syndrome)     Type 2 diabetes mellitus without complication ([facility] [id number]    on metformin    Vocal cord paralysis 2014    vocal cord surgery in [facility]    Wears glasses        No Known Allergies  Current Outpatient Prescriptions on File Prior to Visit   Medication Sig Dispense Refill    LORazepam (ATIVAN) 0.5 MG tablet TAKE 1 TABLET BY MOUTH EVERY 12 HOURS AS NEEDED FOR ANXIETY 60 tablet 2    lisinopril-hydrochlorothiazide (PRINZIDE;ZESTORETIC) 20-25 MG per tablet Take 1 tablet by mouth daily 90 tablet 3    atorvastatin (LIPITOR) 20 MG tablet Take 1 tablet by mouth daily 90 tablet 1    blood glucose test strips (ACCU-CHEK MICHELE PLUS) strip USE  STRIP TO CHECK GLUCOSE TWICE DAILY. Dx: E11.65 Type 2 DM with Hyperglycemia 100 strip 1    ACCU-CHEK SOFTCLIX LANCETS MISC USE TO CHECK GLUCOSE TWICE DAILY.  Dx: E11.65 Type 2 DM with hyperglycemia 100 each 1    metFORMIN (GLUCOPHAGE) 500 MG tablet Take 1 tablet by mouth 2 times daily (with meals) 180 tablet 1    Biotin 5000 MCG CAPS Take 1 capsule by mouth daily      ondansetron (ZOFRAN) 4 MG tablet Take 1 tablet by mouth every 6 hours as needed for Nausea or Vomiting 20 tablet 0    omeprazole (PRILOSEC) 20 MG delayed release capsule Take 1 capsule by mouth Daily 90 capsule 3    warfarin (COUMADIN) 3 MG tablet Take 3 mg by mouth Six times weekly Managed by [facility]: 1.5 mg Thursdays and 3 mg 6x weekly      ferrous sulfate 325 (65 Fe) MG tablet Take 1 tablet by mouth daily (with breakfast) 30 tablet 0    acetaminophen (TYLENOL ARTHRITIS PAIN) 650 MG extended release tablet Take 1,300 mg by mouth nightly       docusate sodium (COLACE) 100 MG capsule Take 1 capsule by mouth 2 times daily (Patient taking differently: Take 100 mg by mouth 2 times daily as needed ) 30 capsule 0    rOPINIRole (REQUIP) 0.5 MG tablet Take 1 tablet by mouth 3 times daily (Patient taking differently: Take 0.5 mg by mouth 3 times daily as needed ) 90 tablet 3   

## 2018-12-10 ENCOUNTER — HOSPITAL ENCOUNTER (OUTPATIENT)
Dept: PHARMACY | Age: 68
Setting detail: THERAPIES SERIES
Discharge: HOME | End: 2018-12-10
Payer: MEDICARE

## 2018-12-10 ENCOUNTER — CARE COORDINATION (OUTPATIENT)
Dept: CARE COORDINATION | Age: 68
End: 2018-12-10

## 2018-12-10 DIAGNOSIS — I26.99 OTHER PULMONARY EMBOLISM WITHOUT ACUTE COR PULMONALE, UNSPECIFIED CHRONICITY (HCC): ICD-10-CM

## 2018-12-10 DIAGNOSIS — I82.433 ACUTE DEEP VEIN THROMBOSIS (DVT) OF POPLITEAL VEIN OF BOTH LOWER EXTREMITIES (HCC): ICD-10-CM

## 2018-12-10 LAB
INR BLD: 1.3
PROTIME: 15.8 SECONDS

## 2018-12-10 PROCEDURE — 85610 PROTHROMBIN TIME: CPT

## 2018-12-10 PROCEDURE — 99212 OFFICE O/P EST SF 10 MIN: CPT

## 2018-12-10 RX ORDER — VITAMIN E 268 MG
400 CAPSULE ORAL DAILY
COMMUNITY
End: 2019-09-23 | Stop reason: ALTCHOICE

## 2018-12-10 NOTE — CARE COORDINATION
Ambulatory Care Coordination Note  12/10/2018  CM Risk Score: 10  Alia Mortality Risk Score: 32    ACC: Nasima Rowe, RN    Summary Note: spoke with  / Jessa Cross and Epi Smith is fine. BS's at times are 150+. She was drinking Atkins shakes to try decreasing carbs and increasing protein. Reports she had labs today and INR was low and told it was r/t the Atkins shakes. Plans to stop the shakes. Discussed making healthier diet choices. Reports he thought about increasing her Metformin dosages and to 3 times daily. Instructed medication safety, and only taking meds as directed. He voiced understanding. Writer encouraged making an appointment. He declined and plans to call after January 1st to schedule. Meds on hand. Denies needs  CC plan; continue following for care coordination needs  BS's? taking meds as directed? Diabetes Assessment    Meal Planning:  None   How often do you test your blood sugar?:  Meals (Comment: Morning and Night)   Have you ever had to go to the ED for symptoms of low blood sugar?:  No       No patient-reported symptoms       and   General Assessment    Do you have any symptoms that are causing concern?:  No                 Care Coordination Interventions    Program Enrollment:  Complex Care  Referral from Primary Care Provider:  No  Suggested Interventions and Community Resources  Fall Risk Prevention:  Not Started  Disease Specific Clinic:  Completed (Comment: [id number])         Goals Addressed             Most Recent     Conditions and Symptoms   Worsening (12/10/2018)             I will schedule office visits, as directed by my provider. I will keep my appointment or reschedule if I have to cancel. I will notify my provider of any barriers to my plan of care. I will follow my Zone Management tool to seek urgent or emergent care. I will notify my provider of any symptoms that indicate a worsening of my condition.     Barriers: overwhelmed by complexity of regimen  Plan for overcoming my barriers: education, support  Confidence: 8/10  Anticipated Goal Completion Date:  2/2019         Medication Management   Worsening (12/10/2018)             I will take my medication as directed. I will notify my provider of any problems with medications, like adverse effects or side effects. I will notify my provider for advice before I stop taking any of my medication. Barriers: overwhelmed by complexity of regimen and lack of education  Plan for overcoming my barriers: care coordination   Confidence: 7/10  Anticipated Goal Completion Date: 3/2019            Prior to Admission medications    Medication Sig Start Date End Date Taking? Authorizing Provider   metFORMIN (GLUCOPHAGE) 500 MG tablet TAKE 1 TABLET BY MOUTH TWICE DAILY WITH MEALS 12/10/18   RANDY Perkins CNP   vitamin E 400 UNIT capsule Take 400 Units by mouth daily    Historical Provider, MD   LORazepam (ATIVAN) 0.5 MG tablet TAKE 1 TABLET BY MOUTH EVERY 12 HOURS AS NEEDED FOR ANXIETY 11/21/18 2/19/19  RANDY Farah CNP   lisinopril-hydrochlorothiazide (PRINZIDE;ZESTORETIC) 20-25 MG per tablet Take 1 tablet by mouth daily 10/15/18   RANDY Lovell CNP   atorvastatin (LIPITOR) 20 MG tablet Take 1 tablet by mouth daily 8/14/18   RANDY Verma CNP   blood glucose test strips (ACCU-CHEK MICHELE PLUS) strip USE  STRIP TO CHECK GLUCOSE TWICE DAILY. Dx: E11.65 Type 2 DM with Hyperglycemia 7/24/18   RANDY Lovell CNP   ACCU-CHEK SOFTCLIX LANCETS MISC USE TO CHECK GLUCOSE TWICE DAILY.  Dx: E11.65 Type 2 DM with hyperglycemia 7/24/18   RANDY Lovell CNP   ondansetron (ZOFRAN) 4 MG tablet Take 1 tablet by mouth every 6 hours as needed for Nausea or Vomiting 5/21/18   Jaye Villatoro DO   omeprazole (PRILOSEC) 20 MG delayed release capsule Take 1 capsule by mouth Daily 3/19/18   RANDY Lovell CNP   warfarin (COUMADIN) 3 MG tablet Take 3 mg by mouth Six times weekly Managed by [facility] Anticoagulation Service: 1.5 mg Thursdays and 3 mg 6x weekly    Historical Provider, MD   ferrous sulfate 325 (65 Fe) MG tablet Take 1 tablet by mouth daily (with breakfast) 2/20/18   Molina Leslie MD   acetaminophen (TYLENOL ARTHRITIS PAIN) 650 MG extended release tablet Take 1,300 mg by mouth nightly     Historical Provider, MD   docusate sodium (COLACE) 100 MG capsule Take 1 capsule by mouth 2 times daily  Patient taking differently: Take 100 mg by mouth 2 times daily as needed  2/12/18   RANDY Sun CNP   rOPINIRole (REQUIP) 0.5 MG tablet Take 1 tablet by mouth 3 times daily  Patient taking differently: Take 0.5 mg by mouth 3 times daily as needed  2/1/18   RANDY Sun CNP   Alcohol Swabs PADS Need to use  twice a day 6/21/17   Xenia Prieto MD   Blood Glucose Monitoring Suppl FLAVIA Glucometer Accu check. Test Blood Sugars 2 Times Daily.  Dx: E 11.22, N 18.3 type II DM 6/21/17   Xenia Prieto MD       Future Appointments  Date Time Provider Rachael Garsia   12/17/2018 8:50 AM [facility] MEDICATION [facility]MT [facility]   2/7/2019 1:00 PM Lukas Dunbar  [facility]

## 2018-12-10 NOTE — TELEPHONE ENCOUNTER
Last ov  7/30/18    Health Maintenance   Topic Date Due    DTaP/Tdap/Td vaccine (1 - Tdap) 09/28/1968    Shingles Vaccine (1 of 2 - 2 Dose Series) 09/28/1999    Colon cancer screen colonoscopy  09/28/1999    DEXA (modify frequency per FRAX score)  09/28/2014    Pneumococcal low/med risk (1 of 2 - PCV13) 09/28/2014    Lipid screen  11/13/2018    Diabetic retinal exam  12/11/2018    Diabetic microalbuminuria test  02/13/2019    Potassium monitoring  06/18/2019    Creatinine monitoring  06/18/2019    A1C test (Diabetic or Prediabetic)  07/30/2019    Breast cancer screen  08/15/2019    Diabetic foot exam  09/04/2019    Flu vaccine  Completed    Hepatitis C screen  Completed             (applicable per patient's age: Cancer Screenings, Depression Screening, Fall Risk Screening, Immunizations)    Hemoglobin A1C (%)   Date Value   07/30/2018 6.9   05/14/2018 6.9 (H)   02/28/2018 5.9     LDL Calculated (mg/dL)   Date Value   11/13/2017 74     AST (U/L)   Date Value   05/14/2018 14     ALT (U/L)   Date Value   05/14/2018 15     BUN (mg/dL)   Date Value   06/18/2018 20      (goal A1C is < 7)   (goal LDL is <100) need 30-50% reduction from baseline     BP Readings from Last 3 Encounters:   09/04/18 130/74   07/30/18 136/84   05/29/18 138/78    (goal /80)      All Future Testing planned in [facility]:  Lab Frequency Next Occurrence   Basic Metabolic Panel         Next Visit Date:  Future Appointments  Date Time Provider Rachael Garsia   12/10/2018 8:30 AM [facility] MEDICATION MGMT [facility]   2/7/2019 1:00 PM Gurpreet Felix DPM [facility]            Patient Active Problem List:     Mixed hyperlipidemia     Essential (primary) hypertension     Anxiety     Gastroesophageal reflux disease without esophagitis     Restless leg syndrome     Pulmonary embolism ([address]     Acute respiratory failure (HCC)     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

## 2018-12-17 ENCOUNTER — HOSPITAL ENCOUNTER (OUTPATIENT)
Dept: PHARMACY | Age: 68
Setting detail: THERAPIES SERIES
Discharge: HOME | End: 2018-12-17
Payer: MEDICARE

## 2018-12-17 LAB
INR BLD: 2.1
PROTIME: 24.7 SECONDS

## 2018-12-17 PROCEDURE — 85610 PROTHROMBIN TIME: CPT

## 2018-12-17 PROCEDURE — 99211 OFF/OP EST MAY X REQ PHY/QHP: CPT

## 2018-12-17 NOTE — PROGRESS NOTES
Patient states compliant with regimen. No bleeding or thromboembolic side effects noted. No significant med or dietary changes. No significant recent illness or disease state changes. PT/INR done in office per protocol. INR today is 2.1. Patient understands dosing directions and information discussed. Dosing card given to patient. Progress note faxed to office. INR therapeutic at 2.1.  Goal 2-3  Continue warfarin 1.5 mg Thurs and 3 mg 6x weekly  Recheck INR in four weeks

## 2019-01-10 ENCOUNTER — CARE COORDINATION (OUTPATIENT)
Dept: CARE COORDINATION | Age: 69
End: 2019-01-10

## 2019-01-14 ENCOUNTER — HOSPITAL ENCOUNTER (OUTPATIENT)
Dept: PHARMACY | Age: 69
Setting detail: THERAPIES SERIES
Discharge: HOME | End: 2019-01-14
Payer: MEDICARE

## 2019-01-14 LAB
INR BLD: 2.7
PROTIME: 32.3 SECONDS

## 2019-01-14 PROCEDURE — 99211 OFF/OP EST MAY X REQ PHY/QHP: CPT

## 2019-01-14 PROCEDURE — 85610 PROTHROMBIN TIME: CPT

## 2019-01-17 ENCOUNTER — CARE COORDINATION (OUTPATIENT)
Dept: CARE COORDINATION | Age: 69
End: 2019-01-17

## 2019-01-28 ENCOUNTER — CARE COORDINATION (OUTPATIENT)
Dept: CARE COORDINATION | Age: 69
End: 2019-01-28

## 2019-02-05 RX ORDER — ATORVASTATIN CALCIUM 20 MG/1
TABLET, FILM COATED ORAL
Qty: 90 TABLET | Refills: 1 | Status: SHIPPED | OUTPATIENT
Start: 2019-02-05 | End: 2019-08-12 | Stop reason: SDUPTHER

## 2019-02-11 ENCOUNTER — HOSPITAL ENCOUNTER (OUTPATIENT)
Dept: PHARMACY | Age: 69
Setting detail: THERAPIES SERIES
Discharge: HOME | End: 2019-02-11
Payer: MEDICARE

## 2019-02-11 ENCOUNTER — CARE COORDINATION (OUTPATIENT)
Dept: CARE COORDINATION | Age: 69
End: 2019-02-11

## 2019-02-11 LAB
INR BLD: 3
PROTIME: 35.9 SECONDS

## 2019-02-11 PROCEDURE — 99211 OFF/OP EST MAY X REQ PHY/QHP: CPT

## 2019-02-11 PROCEDURE — 85610 PROTHROMBIN TIME: CPT

## 2019-02-22 ENCOUNTER — CARE COORDINATION (OUTPATIENT)
Dept: CARE COORDINATION | Age: 69
End: 2019-02-22

## 2019-02-22 ENCOUNTER — OFFICE VISIT (OUTPATIENT)
Dept: PRIMARY CARE CLINIC | Age: 69
End: 2019-02-22
Payer: MEDICARE

## 2019-02-22 VITALS
DIASTOLIC BLOOD PRESSURE: 62 MMHG | SYSTOLIC BLOOD PRESSURE: 96 MMHG | RESPIRATION RATE: 16 BRPM | BODY MASS INDEX: 31.36 KG/M2 | HEART RATE: 70 BPM | WEIGHT: 177 LBS | HEIGHT: 63 IN

## 2019-02-22 DIAGNOSIS — Z13.29 SCREENING FOR THYROID DISORDER: ICD-10-CM

## 2019-02-22 DIAGNOSIS — D68.9 COAGULOPATHY (HCC): ICD-10-CM

## 2019-02-22 DIAGNOSIS — Z99.11: ICD-10-CM

## 2019-02-22 DIAGNOSIS — J96.11 CHRONIC RESPIRATORY FAILURE WITH HYPOXIA (HCC): ICD-10-CM

## 2019-02-22 DIAGNOSIS — Z12.39 SCREENING FOR BREAST CANCER: ICD-10-CM

## 2019-02-22 DIAGNOSIS — E78.2 MIXED HYPERLIPIDEMIA: ICD-10-CM

## 2019-02-22 DIAGNOSIS — I10 ESSENTIAL (PRIMARY) HYPERTENSION: ICD-10-CM

## 2019-02-22 DIAGNOSIS — E11.65 TYPE 2 DIABETES MELLITUS WITH HYPERGLYCEMIA, WITHOUT LONG-TERM CURRENT USE OF INSULIN (HCC): Primary | ICD-10-CM

## 2019-02-22 DIAGNOSIS — F41.9 ANXIETY: ICD-10-CM

## 2019-02-22 DIAGNOSIS — E44.0 MODERATE MALNUTRITION (HCC): ICD-10-CM

## 2019-02-22 DIAGNOSIS — Z13.0 SCREENING FOR DEFICIENCY ANEMIA: ICD-10-CM

## 2019-02-22 PROCEDURE — 3046F HEMOGLOBIN A1C LEVEL >9.0%: CPT | Performed by: NURSE PRACTITIONER

## 2019-02-22 PROCEDURE — 99214 OFFICE O/P EST MOD 30 MIN: CPT | Performed by: NURSE PRACTITIONER

## 2019-02-22 PROCEDURE — G8400 PT W/DXA NO RESULTS DOC: HCPCS | Performed by: NURSE PRACTITIONER

## 2019-02-22 PROCEDURE — 3017F COLORECTAL CA SCREEN DOC REV: CPT | Performed by: NURSE PRACTITIONER

## 2019-02-22 PROCEDURE — 1090F PRES/ABSN URINE INCON ASSESS: CPT | Performed by: NURSE PRACTITIONER

## 2019-02-22 PROCEDURE — G8482 FLU IMMUNIZE ORDER/ADMIN: HCPCS | Performed by: NURSE PRACTITIONER

## 2019-02-22 PROCEDURE — 1036F TOBACCO NON-USER: CPT | Performed by: NURSE PRACTITIONER

## 2019-02-22 PROCEDURE — G8417 CALC BMI ABV UP PARAM F/U: HCPCS | Performed by: NURSE PRACTITIONER

## 2019-02-22 PROCEDURE — 1101F PT FALLS ASSESS-DOCD LE1/YR: CPT | Performed by: NURSE PRACTITIONER

## 2019-02-22 PROCEDURE — 2022F DILAT RTA XM EVC RTNOPTHY: CPT | Performed by: NURSE PRACTITIONER

## 2019-02-22 PROCEDURE — 1123F ACP DISCUSS/DSCN MKR DOCD: CPT | Performed by: NURSE PRACTITIONER

## 2019-02-22 PROCEDURE — 4040F PNEUMOC VAC/ADMIN/RCVD: CPT | Performed by: NURSE PRACTITIONER

## 2019-02-22 PROCEDURE — G8427 DOCREV CUR MEDS BY ELIG CLIN: HCPCS | Performed by: NURSE PRACTITIONER

## 2019-02-22 RX ORDER — LORAZEPAM 0.5 MG/1
TABLET ORAL
Qty: 60 TABLET | Refills: 2 | Status: SHIPPED | OUTPATIENT
Start: 2019-02-22 | End: 2019-05-21 | Stop reason: SDUPTHER

## 2019-02-22 RX ORDER — LISINOPRIL 2.5 MG/1
2.5 TABLET ORAL DAILY
Qty: 30 TABLET | Refills: 0 | Status: SHIPPED | OUTPATIENT
Start: 2019-02-22 | End: 2019-03-11

## 2019-02-22 RX ORDER — HYDROCHLOROTHIAZIDE 25 MG/1
25 TABLET ORAL EVERY MORNING
Qty: 30 TABLET | Refills: 0 | Status: SHIPPED | OUTPATIENT
Start: 2019-02-22 | End: 2019-03-11

## 2019-02-22 ASSESSMENT — ENCOUNTER SYMPTOMS
COUGH: 0
ABDOMINAL PAIN: 0
SHORTNESS OF BREATH: 0
BACK PAIN: 0
BLURRED VISION: 0
ORTHOPNEA: 0

## 2019-02-25 ENCOUNTER — CARE COORDINATION (OUTPATIENT)
Dept: CARE COORDINATION | Age: 69
End: 2019-02-25

## 2019-03-11 ENCOUNTER — HOSPITAL ENCOUNTER (OUTPATIENT)
Age: 69
Discharge: HOME | End: 2019-03-11
Payer: MEDICARE

## 2019-03-11 ENCOUNTER — HOSPITAL ENCOUNTER (OUTPATIENT)
Dept: PHARMACY | Age: 69
Setting detail: THERAPIES SERIES
Discharge: HOME | End: 2019-03-11
Payer: MEDICARE

## 2019-03-11 ENCOUNTER — OFFICE VISIT (OUTPATIENT)
Dept: PRIMARY CARE CLINIC | Age: 69
End: 2019-03-11
Payer: MEDICARE

## 2019-03-11 ENCOUNTER — CARE COORDINATION (OUTPATIENT)
Dept: CARE COORDINATION | Age: 69
End: 2019-03-11

## 2019-03-11 VITALS
BODY MASS INDEX: 31.36 KG/M2 | HEIGHT: 63 IN | DIASTOLIC BLOOD PRESSURE: 88 MMHG | RESPIRATION RATE: 16 BRPM | HEART RATE: 74 BPM | WEIGHT: 177 LBS | SYSTOLIC BLOOD PRESSURE: 144 MMHG

## 2019-03-11 DIAGNOSIS — E11.65 TYPE 2 DIABETES MELLITUS WITH HYPERGLYCEMIA, WITHOUT LONG-TERM CURRENT USE OF INSULIN (HCC): ICD-10-CM

## 2019-03-11 DIAGNOSIS — Z13.29 SCREENING FOR THYROID DISORDER: ICD-10-CM

## 2019-03-11 DIAGNOSIS — I10 ESSENTIAL (PRIMARY) HYPERTENSION: Primary | ICD-10-CM

## 2019-03-11 DIAGNOSIS — Z13.0 SCREENING FOR DEFICIENCY ANEMIA: ICD-10-CM

## 2019-03-11 DIAGNOSIS — E78.2 MIXED HYPERLIPIDEMIA: ICD-10-CM

## 2019-03-11 LAB
ALBUMIN SERPL-MCNC: 4.3 G/DL (ref 3.5–5.2)
ALBUMIN/GLOBULIN RATIO: ABNORMAL (ref 1–2.5)
ALP BLD-CCNC: 48 U/L (ref 35–104)
ALT SERPL-CCNC: 17 U/L (ref 5–33)
ANION GAP SERPL CALCULATED.3IONS-SCNC: 16 MMOL/L (ref 9–17)
AST SERPL-CCNC: 13 U/L
BILIRUB SERPL-MCNC: 0.23 MG/DL (ref 0.3–1.2)
BUN BLDV-MCNC: 16 MG/DL (ref 8–23)
BUN/CREAT BLD: ABNORMAL (ref 9–20)
CALCIUM SERPL-MCNC: 10.2 MG/DL (ref 8.6–10.4)
CHLORIDE BLD-SCNC: 101 MMOL/L (ref 98–107)
CHOLESTEROL/HDL RATIO: 3
CHOLESTEROL: 195 MG/DL
CO2: 22 MMOL/L (ref 20–31)
CREAT SERPL-MCNC: 0.77 MG/DL (ref 0.5–0.9)
ESTIMATED AVERAGE GLUCOSE: 143 MG/DL
GFR AFRICAN AMERICAN: >60 ML/MIN
GFR NON-AFRICAN AMERICAN: >60 ML/MIN
GFR SERPL CREATININE-BSD FRML MDRD: ABNORMAL ML/MIN/{1.73_M2}
GFR SERPL CREATININE-BSD FRML MDRD: ABNORMAL ML/MIN/{1.73_M2}
GLUCOSE BLD-MCNC: 132 MG/DL (ref 70–99)
HBA1C MFR BLD: 6.6 % (ref 4–6)
HCT VFR BLD CALC: 41.4 % (ref 36–46)
HDLC SERPL-MCNC: 65 MG/DL
HEMOGLOBIN: 14.1 G/DL (ref 12–16)
INR BLD: 2.4
LDL CHOLESTEROL: 77 MG/DL (ref 0–130)
MCH RBC QN AUTO: 33.6 PG (ref 26–34)
MCHC RBC AUTO-ENTMCNC: 34 G/DL (ref 31–37)
MCV RBC AUTO: 98.9 FL (ref 80–100)
NRBC AUTOMATED: NORMAL PER 100 WBC
PDW BLD-RTO: 14 % (ref 11.5–14.9)
PLATELET # BLD: 303 K/UL (ref 150–450)
PMV BLD AUTO: 8.4 FL (ref 6–12)
POTASSIUM SERPL-SCNC: 3.9 MMOL/L (ref 3.7–5.3)
PROTIME: 29.3 SECONDS
RBC # BLD: 4.19 M/UL (ref 4–5.2)
SODIUM BLD-SCNC: 139 MMOL/L (ref 135–144)
TOTAL PROTEIN: 6.9 G/DL (ref 6.4–8.3)
TRIGL SERPL-MCNC: 263 MG/DL
TSH SERPL DL<=0.05 MIU/L-ACNC: 1.39 MIU/L (ref 0.3–5)
VLDLC SERPL CALC-MCNC: ABNORMAL MG/DL (ref 1–30)
WBC # BLD: 9.9 K/UL (ref 3.5–11)

## 2019-03-11 PROCEDURE — 85027 COMPLETE CBC AUTOMATED: CPT

## 2019-03-11 PROCEDURE — G8427 DOCREV CUR MEDS BY ELIG CLIN: HCPCS | Performed by: NURSE PRACTITIONER

## 2019-03-11 PROCEDURE — 1101F PT FALLS ASSESS-DOCD LE1/YR: CPT | Performed by: NURSE PRACTITIONER

## 2019-03-11 PROCEDURE — 3017F COLORECTAL CA SCREEN DOC REV: CPT | Performed by: NURSE PRACTITIONER

## 2019-03-11 PROCEDURE — 99214 OFFICE O/P EST MOD 30 MIN: CPT | Performed by: NURSE PRACTITIONER

## 2019-03-11 PROCEDURE — 83036 HEMOGLOBIN GLYCOSYLATED A1C: CPT

## 2019-03-11 PROCEDURE — 80061 LIPID PANEL: CPT

## 2019-03-11 PROCEDURE — 1123F ACP DISCUSS/DSCN MKR DOCD: CPT | Performed by: NURSE PRACTITIONER

## 2019-03-11 PROCEDURE — 1036F TOBACCO NON-USER: CPT | Performed by: NURSE PRACTITIONER

## 2019-03-11 PROCEDURE — 4040F PNEUMOC VAC/ADMIN/RCVD: CPT | Performed by: NURSE PRACTITIONER

## 2019-03-11 PROCEDURE — G8400 PT W/DXA NO RESULTS DOC: HCPCS | Performed by: NURSE PRACTITIONER

## 2019-03-11 PROCEDURE — 1090F PRES/ABSN URINE INCON ASSESS: CPT | Performed by: NURSE PRACTITIONER

## 2019-03-11 PROCEDURE — 84443 ASSAY THYROID STIM HORMONE: CPT

## 2019-03-11 PROCEDURE — 36415 COLL VENOUS BLD VENIPUNCTURE: CPT

## 2019-03-11 PROCEDURE — G8417 CALC BMI ABV UP PARAM F/U: HCPCS | Performed by: NURSE PRACTITIONER

## 2019-03-11 PROCEDURE — 80053 COMPREHEN METABOLIC PANEL: CPT

## 2019-03-11 PROCEDURE — 99211 OFF/OP EST MAY X REQ PHY/QHP: CPT

## 2019-03-11 PROCEDURE — G8482 FLU IMMUNIZE ORDER/ADMIN: HCPCS | Performed by: NURSE PRACTITIONER

## 2019-03-11 PROCEDURE — 85610 PROTHROMBIN TIME: CPT

## 2019-03-11 RX ORDER — LISINOPRIL AND HYDROCHLOROTHIAZIDE 25; 20 MG/1; MG/1
1 TABLET ORAL DAILY
COMMUNITY
End: 2019-12-02 | Stop reason: SDUPTHER

## 2019-03-11 ASSESSMENT — ENCOUNTER SYMPTOMS
BACK PAIN: 0
SHORTNESS OF BREATH: 0
ABDOMINAL PAIN: 0
COUGH: 0

## 2019-03-11 ASSESSMENT — PATIENT HEALTH QUESTIONNAIRE - PHQ9
SUM OF ALL RESPONSES TO PHQ9 QUESTIONS 1 & 2: 0
2. FEELING DOWN, DEPRESSED OR HOPELESS: 0
SUM OF ALL RESPONSES TO PHQ QUESTIONS 1-9: 0
SUM OF ALL RESPONSES TO PHQ QUESTIONS 1-9: 0
1. LITTLE INTEREST OR PLEASURE IN DOING THINGS: 0

## 2019-03-21 ENCOUNTER — OFFICE VISIT (OUTPATIENT)
Dept: PODIATRY | Age: 69
End: 2019-03-21
Payer: MEDICARE

## 2019-03-21 VITALS — WEIGHT: 177 LBS | HEIGHT: 63 IN | BODY MASS INDEX: 31.36 KG/M2

## 2019-03-21 DIAGNOSIS — E11.42 DIABETIC POLYNEUROPATHY ASSOCIATED WITH TYPE 2 DIABETES MELLITUS (HCC): ICD-10-CM

## 2019-03-21 DIAGNOSIS — I73.9 PVD (PERIPHERAL VASCULAR DISEASE) (HCC): ICD-10-CM

## 2019-03-21 DIAGNOSIS — M20.41 HAMMER TOES OF BOTH FEET: ICD-10-CM

## 2019-03-21 DIAGNOSIS — M20.42 HAMMER TOES OF BOTH FEET: ICD-10-CM

## 2019-03-21 DIAGNOSIS — M79.604 PAIN IN BOTH LOWER EXTREMITIES: ICD-10-CM

## 2019-03-21 DIAGNOSIS — D23.72 BENIGN NEOPLASM OF SKIN OF LEFT FOOT: Primary | ICD-10-CM

## 2019-03-21 DIAGNOSIS — M79.605 PAIN IN BOTH LOWER EXTREMITIES: ICD-10-CM

## 2019-03-21 PROCEDURE — 1101F PT FALLS ASSESS-DOCD LE1/YR: CPT | Performed by: PODIATRIST

## 2019-03-21 PROCEDURE — 17110 DESTRUCTION B9 LES UP TO 14: CPT | Performed by: PODIATRIST

## 2019-03-21 PROCEDURE — 1036F TOBACCO NON-USER: CPT | Performed by: PODIATRIST

## 2019-03-21 PROCEDURE — G8400 PT W/DXA NO RESULTS DOC: HCPCS | Performed by: PODIATRIST

## 2019-03-21 PROCEDURE — 1090F PRES/ABSN URINE INCON ASSESS: CPT | Performed by: PODIATRIST

## 2019-03-21 PROCEDURE — 3017F COLORECTAL CA SCREEN DOC REV: CPT | Performed by: PODIATRIST

## 2019-03-21 PROCEDURE — 2022F DILAT RTA XM EVC RTNOPTHY: CPT | Performed by: PODIATRIST

## 2019-03-21 PROCEDURE — 1123F ACP DISCUSS/DSCN MKR DOCD: CPT | Performed by: PODIATRIST

## 2019-03-21 PROCEDURE — 4040F PNEUMOC VAC/ADMIN/RCVD: CPT | Performed by: PODIATRIST

## 2019-03-21 PROCEDURE — G8427 DOCREV CUR MEDS BY ELIG CLIN: HCPCS | Performed by: PODIATRIST

## 2019-03-21 PROCEDURE — G8482 FLU IMMUNIZE ORDER/ADMIN: HCPCS | Performed by: PODIATRIST

## 2019-03-21 PROCEDURE — 99213 OFFICE O/P EST LOW 20 MIN: CPT | Performed by: PODIATRIST

## 2019-03-21 PROCEDURE — G8417 CALC BMI ABV UP PARAM F/U: HCPCS | Performed by: PODIATRIST

## 2019-03-21 PROCEDURE — 3044F HG A1C LEVEL LT 7.0%: CPT | Performed by: PODIATRIST

## 2019-03-28 ENCOUNTER — CARE COORDINATION (OUTPATIENT)
Dept: CARE COORDINATION | Age: 69
End: 2019-03-28

## 2019-04-08 ENCOUNTER — CARE COORDINATION (OUTPATIENT)
Dept: CARE COORDINATION | Age: 69
End: 2019-04-08

## 2019-04-08 ENCOUNTER — HOSPITAL ENCOUNTER (OUTPATIENT)
Dept: PHARMACY | Age: 69
Setting detail: THERAPIES SERIES
Discharge: HOME | End: 2019-04-08
Payer: MEDICARE

## 2019-04-08 LAB
INR BLD: 2.8
PROTIME: 33.5 SECONDS

## 2019-04-08 PROCEDURE — 99211 OFF/OP EST MAY X REQ PHY/QHP: CPT

## 2019-04-08 PROCEDURE — 85610 PROTHROMBIN TIME: CPT

## 2019-04-08 NOTE — CARE COORDINATION
Ambulatory Care Coordination Note  4/8/2019  CM Risk Score: 6  Alia Mortality Risk Score: 26    ACC: Jean Joshi, RN    Summary Note: Spoke with / Cabrera. Max doing well. BSs averaging under 150s. Recent A1C; 6.6. No episodes of hypoglycemia. Reports, if BSs run high its because of something she ate. BP has been good. No episodes of hypotension. Knowledgeable of DM, conditions, A1C, management, and risk factors. Aware how diet choices affects BSs. Declined diabetic educational classes and referral to dietician. Has her meds, using as directed. Denies needs or concerns. Encouraged calling office as needed  CC plan; review for care coordination graduation with provider       Diabetes Assessment    Medic Alert ID:  No  Meal Planning:  None   How often do you test your blood sugar?:  Meals (Comment: Morning and Night)   Do you have barriers with adherence to non-pharmacologic self-management interventions? (Nutrition/Exercise/Self-Monitoring):  No   Have you ever had to go to the ED for symptoms of low blood sugar?:  No           and   General Assessment    Do you have any symptoms that are causing concern?:  No               Care Coordination Interventions    Program Enrollment:  Complex Care  Referral from Primary Care Provider:  No  Suggested Interventions and Community Resources  Diabetes Education:  Declined  Fall Risk Prevention:  Not Started  Disease Specific Clinic:  Completed (Comment: [id number])  Registered Dietician:  Declined  Zone Management Tools:  Completed (Comment: DM)         Goals Addressed                 This Visit's Progress     Conditions and Symptoms   On track     I will schedule office visits, as directed by my provider. I will keep my appointment or reschedule if I have to cancel. I will notify my provider of any barriers to my plan of care. I will follow my Zone Management tool to seek urgent or emergent care.   I will notify my provider of any symptoms that indicate a worsening of my condition. Barriers: overwhelmed by complexity of regimen  Plan for overcoming my barriers: education, support  Confidence: 8/10  Anticipated Goal Completion Date:  2/2019  Updated 4/2019    Goal updated; barriers and reporting BS's         Medication Management   On track     I will take my medication as directed. I will notify my provider of any problems with medications, like adverse effects or side effects. I will notify my provider for advice before I stop taking any of my medication. Barriers: overwhelmed by complexity of regimen and lack of education  Plan for overcoming my barriers: care coordination   Confidence: 7/10  Anticipated Goal Completion Date: 3/2019  Update; 4/2019    Goal updated; d/t continued barriers             Prior to Admission medications    Medication Sig Start Date End Date Taking? Authorizing Provider   lisinopril-hydrochlorothiazide (PRINZIDE;ZESTORETIC) 20-25 MG per tablet Take 1 tablet by mouth daily    Historical Provider, MD   LORazepam (ATIVAN) 0.5 MG tablet TAKE 1 TABLET BY MOUTH EVERY 12 HOURS AS NEEDED FOR ANXIETY. 2/22/19 5/22/19  RANDY Montenegro CNP   atorvastatin (LIPITOR) 20 MG tablet TAKE 1 TABLET BY MOUTH ONCE DAILY 2/5/19   RANDY Li CNP   metFORMIN (GLUCOPHAGE) 500 MG tablet TAKE 1 TABLET BY MOUTH TWICE DAILY WITH MEALS 12/10/18   RANDY Li CNP   vitamin E 400 UNIT capsule Take 400 Units by mouth daily    Historical Provider, MD   blood glucose test strips (ACCU-CHEK MICHELE PLUS) strip USE  STRIP TO CHECK GLUCOSE TWICE DAILY. Dx: E11.65 Type 2 DM with Hyperglycemia 7/24/18   RANDY Montenegro CNP   ACCU-CHEK SOFTCLIX LANCETS MISC USE TO CHECK GLUCOSE TWICE DAILY.  Dx: E11.65 Type 2 DM with hyperglycemia 7/24/18   RANDY Montenegro CNP   ondansetron (ZOFRAN) 4 MG tablet Take 1 tablet by mouth every 6 hours as needed for Nausea or Vomiting 5/21/18   Smita Perez DO omeprazole (PRILOSEC) 20 MG delayed release capsule Take 1 capsule by mouth Daily 3/19/18   RANDY Barton CNP   warfarin (COUMADIN) 3 MG tablet Take 3 mg by mouth Six times weekly Managed by [facility] Anticoagulation Service: 1.5 mg Thursdays and 3 mg 6x weekly    Historical Provider, MD   ferrous sulfate 325 (65 Fe) MG tablet Take 1 tablet by mouth daily (with breakfast) 2/20/18   Debo Cavanaugh MD   acetaminophen (TYLENOL ARTHRITIS PAIN) 650 MG extended release tablet Take 1,300 mg by mouth nightly     Historical Provider, MD   docusate sodium (COLACE) 100 MG capsule Take 1 capsule by mouth 2 times daily  Patient taking differently: Take 100 mg by mouth 2 times daily as needed  2/12/18   RANDY Barton CNP   rOPINIRole (REQUIP) 0.5 MG tablet Take 1 tablet by mouth 3 times daily  Patient taking differently: Take 0.5 mg by mouth 3 times daily as needed  2/1/18   RANDY Barton CNP   Alcohol Swabs PADS Need to use  twice a day 6/21/17   Kenya Epperson MD   Blood Glucose Monitoring Suppl FLAVIA Glucometer Accu check. Test Blood Sugars 2 Times Daily.  Dx: E 11.22, N 18.3 type II DM 6/21/17   Kenya Epperson MD       Future Appointments   Date Time Provider Rachael Garsia   5/6/2019  9:10 AM  MEDICATION MGMT [facility]   5/23/2019  1:00 PM ALETHEA Childers [facility]ODI[facility]   6/10/2019 10:40 AM RANDY Barton CNP

## 2019-04-08 NOTE — PROGRESS NOTES
Patient states compliant all of the time with regimen. No bleeding or thromboembolic side effects noted. No significant med or dietary changes. Patient is back to taking lisinopril/hctz 20/25 mg. No significant recent illness or disease state changes. PT/INR done in office per protocol. INR was therapeutic at 2.8 (goal 2-3). Warfarin regimen will be continued at current dose 1.5 mg Thurs and 3 mg all other days. Will retest in 4 weeks. Patient understands dosing directions and information discussed. Dosing schedule and follow up appointment given to patient. Progress note routed to referring physicians office.

## 2019-04-29 DIAGNOSIS — I10 ESSENTIAL (PRIMARY) HYPERTENSION: ICD-10-CM

## 2019-04-29 RX ORDER — HYDROCHLOROTHIAZIDE 25 MG/1
TABLET ORAL
Qty: 30 TABLET | Refills: 0 | Status: SHIPPED | OUTPATIENT
Start: 2019-04-29 | End: 2019-06-10 | Stop reason: ALTCHOICE

## 2019-04-29 RX ORDER — LISINOPRIL 2.5 MG/1
TABLET ORAL
Qty: 30 TABLET | Refills: 0 | Status: SHIPPED | OUTPATIENT
Start: 2019-04-29 | End: 2019-06-10 | Stop reason: ALTCHOICE

## 2019-04-29 NOTE — TELEPHONE ENCOUNTER
Last ov  3./11/19    Health Maintenance   Topic Date Due    DTaP/Tdap/Td vaccine (1 - Tdap) 09/28/1968    Shingles Vaccine (1 of 2) 09/28/1999    Colon cancer screen colonoscopy  09/28/1999    DEXA (modify frequency per FRAX score)  09/28/2014    Pneumococcal 65+ years Vaccine (1 of 2 - PCV13) 09/28/2014    Diabetic retinal exam  12/11/2018    Diabetic microalbuminuria test  02/13/2019    Breast cancer screen  08/15/2019    A1C test (Diabetic or Prediabetic)  03/11/2020    Lipid screen  03/11/2020    Potassium monitoring  03/11/2020    Creatinine monitoring  03/11/2020    Diabetic foot exam  03/22/2020    Flu vaccine  Completed    Hepatitis C screen  Completed             (applicable per patient's age: Cancer Screenings, Depression Screening, Fall Risk Screening, Immunizations)    Hemoglobin A1C (%)   Date Value   03/11/2019 6.6 (H)   07/30/2018 6.9   05/14/2018 6.9 (H)     LDL Cholesterol (mg/dL)   Date Value   03/11/2019 77     LDL Calculated (mg/dL)   Date Value   11/13/2017 74     AST (U/L)   Date Value   03/11/2019 13     ALT (U/L)   Date Value   03/11/2019 17     BUN (mg/dL)   Date Value   03/11/2019 16      (goal A1C is < 7)   (goal LDL is <100) need 30-50% reduction from baseline     BP Readings from Last 3 Encounters:   03/11/19 (!) 144/88   02/22/19 96/62   09/04/18 130/74    (goal /80)      All Future Testing planned in [facility]:  Lab Frequency Next Occurrence   RAUL DIGITAL SCREEN W CAD BILATERAL Once 03/24/2019       Next Visit Date:  Future Appointments   Date Time Provider Rachael Garsia   5/6/2019  9:10 AM [facility] MEDICATION MGMT [facility]   5/23/2019  1:00 PM Mushtaq Hoover DPM [facility]   6/10/2019 10:40 AM RANDY Quinonez - CNP [facility]            Patient Active Problem List:     Mixed hyperlipidemia     Essential (primary) hypertension     Anxiety     Gastroesophageal reflux disease without esophagitis     Restless leg syndrome

## 2019-05-06 ENCOUNTER — HOSPITAL ENCOUNTER (OUTPATIENT)
Dept: PHARMACY | Age: 69
Setting detail: THERAPIES SERIES
Discharge: HOME | End: 2019-05-06
Payer: MEDICARE

## 2019-05-06 LAB
INR BLD: 3.2
PROTIME: 38.7 SECONDS

## 2019-05-06 PROCEDURE — 85610 PROTHROMBIN TIME: CPT

## 2019-05-06 PROCEDURE — 99211 OFF/OP EST MAY X REQ PHY/QHP: CPT

## 2019-05-20 ENCOUNTER — HOSPITAL ENCOUNTER (OUTPATIENT)
Dept: PHARMACY | Age: 69
Setting detail: THERAPIES SERIES
Discharge: HOME | End: 2019-05-20
Payer: MEDICARE

## 2019-05-20 LAB
INR BLD: 3.4
PROTIME: 40.7 SECONDS

## 2019-05-20 PROCEDURE — 85610 PROTHROMBIN TIME: CPT

## 2019-05-20 PROCEDURE — 99212 OFFICE O/P EST SF 10 MIN: CPT

## 2019-05-20 NOTE — PROGRESS NOTES
Patient states compliant all of the time with regimen. No bleeding side effects noted. Still has some bruising on arms. No nose bleeds. No significant med or dietary changes. No significant recent illness or disease state changes. Patient is retaining more fluid. Has an appointment with PCP today. PT/INR done in office per protocol. INR was supratherapeutic at 3.4 (goal 2-3). Warfarin regimen will be held for 1 day and then reduced to 1.5 mg MWF and 3 mg all other days. Will retest in 1 week. Patient counseled on signs/symptoms of bleeding and when to seek medical attention. Patient instructed to use extra precautions with elevated INR. Patient understands dosing directions and information discussed. Dosing schedule and follow up appointment given to patient. Progress note routed to referring physicians office. Patient acknowledges working in [address] [place] [address] with Pharmacist as referred by his/her physician.

## 2019-05-21 DIAGNOSIS — F41.9 ANXIETY: ICD-10-CM

## 2019-05-22 RX ORDER — LORAZEPAM 0.5 MG/1
TABLET ORAL
Qty: 60 TABLET | Refills: 2 | Status: SHIPPED | OUTPATIENT
Start: 2019-05-22 | End: 2019-06-21

## 2019-05-22 NOTE — TELEPHONE ENCOUNTER
Last OV 03/11/2019    Health Maintenance   Topic Date Due    DTaP/Tdap/Td vaccine (1 - Tdap) 09/28/1968    Shingles Vaccine (1 of 2) 09/28/1999    Colon cancer screen colonoscopy  09/28/1999    DEXA (modify frequency per FRAX score)  09/28/2014    Pneumococcal 65+ years Vaccine (1 of 2 - PCV13) 09/28/2014    Diabetic retinal exam  12/11/2018    Diabetic microalbuminuria test  02/13/2019    Breast cancer screen  08/15/2019    A1C test (Diabetic or Prediabetic)  03/11/2020    Lipid screen  03/11/2020    Potassium monitoring  03/11/2020    Creatinine monitoring  03/11/2020    Diabetic foot exam  03/22/2020    Flu vaccine  Completed    Hepatitis C screen  Completed             (applicable per patient's age: Cancer Screenings, Depression Screening, Fall Risk Screening, Immunizations)    Hemoglobin A1C (%)   Date Value   03/11/2019 6.6 (H)   07/30/2018 6.9   05/14/2018 6.9 (H)     LDL Cholesterol (mg/dL)   Date Value   03/11/2019 77     LDL Calculated (mg/dL)   Date Value   11/13/2017 74     AST (U/L)   Date Value   03/11/2019 13     ALT (U/L)   Date Value   03/11/2019 17     BUN (mg/dL)   Date Value   03/11/2019 16      (goal A1C is < 7)   (goal LDL is <100) need 30-50% reduction from baseline     BP Readings from Last 3 Encounters:   03/11/19 (!) 144/88   02/22/19 96/62   09/04/18 130/74    (goal /80)      All Future Testing planned in [facility]:  Lab Frequency Next Occurrence   RAUL DIGITAL SCREEN W CAD BILATERAL Once 03/24/2019       Next Visit Date:  Future Appointments   Date Time Provider Rachael Garsia   5/23/2019  1:00 PM ALETHEA RasconURG PODIAT [facility]   5/28/2019  9:10 AM ST[facility] MEDICATION MGMT [facility]   6/10/2019 10:40 AM RANDY Greer - CNP [facility] [facility]            Patient Active Problem List:     Mixed hyperlipidemia     Essential (primary) hypertension     Anxiety     Gastroesophageal reflux disease without esophagitis     Restless leg syndrome

## 2019-05-23 ENCOUNTER — OFFICE VISIT (OUTPATIENT)
Dept: PODIATRY | Age: 69
End: 2019-05-23
Payer: MEDICARE

## 2019-05-23 VITALS — HEIGHT: 63 IN | WEIGHT: 173 LBS | BODY MASS INDEX: 30.65 KG/M2

## 2019-05-23 DIAGNOSIS — I73.9 PVD (PERIPHERAL VASCULAR DISEASE) (HCC): ICD-10-CM

## 2019-05-23 DIAGNOSIS — E11.51 TYPE II DIABETES MELLITUS WITH PERIPHERAL CIRCULATORY DISORDER (HCC): Primary | ICD-10-CM

## 2019-05-23 DIAGNOSIS — B35.1 DERMATOPHYTOSIS OF NAIL: ICD-10-CM

## 2019-05-23 DIAGNOSIS — D23.72 BENIGN NEOPLASM OF SKIN OF LOWER LIMB, INCLUDING HIP, LEFT: ICD-10-CM

## 2019-05-23 DIAGNOSIS — D23.71 BENIGN NEOPLASM OF SKIN OF LOWER LIMB, INCLUDING HIP, RIGHT: ICD-10-CM

## 2019-05-23 PROCEDURE — 11721 DEBRIDE NAIL 6 OR MORE: CPT | Performed by: PODIATRIST

## 2019-05-23 PROCEDURE — 17110 DESTRUCTION B9 LES UP TO 14: CPT | Performed by: PODIATRIST

## 2019-05-23 PROCEDURE — G8417 CALC BMI ABV UP PARAM F/U: HCPCS | Performed by: PODIATRIST

## 2019-05-23 PROCEDURE — G8400 PT W/DXA NO RESULTS DOC: HCPCS | Performed by: PODIATRIST

## 2019-05-23 PROCEDURE — 1090F PRES/ABSN URINE INCON ASSESS: CPT | Performed by: PODIATRIST

## 2019-05-23 PROCEDURE — 3044F HG A1C LEVEL LT 7.0%: CPT | Performed by: PODIATRIST

## 2019-05-23 PROCEDURE — G8427 DOCREV CUR MEDS BY ELIG CLIN: HCPCS | Performed by: PODIATRIST

## 2019-05-23 PROCEDURE — 1036F TOBACCO NON-USER: CPT | Performed by: PODIATRIST

## 2019-05-23 PROCEDURE — 4040F PNEUMOC VAC/ADMIN/RCVD: CPT | Performed by: PODIATRIST

## 2019-05-23 PROCEDURE — 2022F DILAT RTA XM EVC RTNOPTHY: CPT | Performed by: PODIATRIST

## 2019-05-23 PROCEDURE — 1123F ACP DISCUSS/DSCN MKR DOCD: CPT | Performed by: PODIATRIST

## 2019-05-23 PROCEDURE — 3017F COLORECTAL CA SCREEN DOC REV: CPT | Performed by: PODIATRIST

## 2019-05-23 NOTE — PROGRESS NOTES
2014    on coumadin    RLS (restless legs syndrome)     Type 2 diabetes mellitus without complication ([facility] [address] 2533    on metformin    Vocal cord paralysis 2014    [facility] in [facility]    Wears glasses        No Known Allergies  Current Outpatient Medications on File Prior to Visit   Medication Sig Dispense Refill    LORazepam (ATIVAN) 0.5 MG tablet TAKE 1 TABLET BY MOUTH EVERY 12 HOURS AS NEEDED FOR ANXIETY 60 tablet 2    hydrochlorothiazide (HYDRODIURIL) 25 MG tablet TAKE 1 TABLET BY MOUTH ONCE DAILY IN THE MORNING 30 tablet 0    lisinopril (PRINIVIL;ZESTRIL) 2.5 MG tablet TAKE 1 TABLET BY MOUTH ONCE DAILY 30 tablet 0    lisinopril-hydrochlorothiazide (PRINZIDE;ZESTORETIC) 20-25 MG per tablet Take 1 tablet by mouth daily      atorvastatin (LIPITOR) 20 MG tablet TAKE 1 TABLET BY MOUTH ONCE DAILY 90 tablet 1    metFORMIN (GLUCOPHAGE) 500 MG tablet TAKE 1 TABLET BY MOUTH TWICE DAILY WITH MEALS 180 tablet 1    vitamin E 400 UNIT capsule Take 400 Units by mouth daily      blood glucose test strips (ACCU-CHEK MICHELE PLUS) strip USE  STRIP TO CHECK GLUCOSE TWICE DAILY. Dx: E11.65 Type 2 DM with Hyperglycemia 100 strip 1    ACCU-CHEK SOFTCLIX LANCETS MISC USE TO CHECK GLUCOSE TWICE DAILY.  Dx: E11.65 Type 2 DM with hyperglycemia 100 each 1    ondansetron (ZOFRAN) 4 MG tablet Take 1 tablet by mouth every 6 hours as needed for Nausea or Vomiting 20 tablet 0    omeprazole (PRILOSEC) 20 MG delayed release capsule Take 1 capsule by mouth Daily 90 capsule 3    warfarin (COUMADIN) 3 MG tablet Take 3 mg by mouth See Admin Instructions Managed by [facility] Anticoagulation Service: 1.5 mg Mondays/ Thursdays and 3 mg 5x weekly      ferrous sulfate 325 (65 Fe) MG tablet Take 1 tablet by mouth daily (with breakfast) 30 tablet 0    acetaminophen (TYLENOL ARTHRITIS PAIN) 650 MG extended release tablet Take 1,300 mg by mouth nightly       docusate sodium (COLACE) 100 MG capsule Take 1 capsule by [] [] [] [] [] [] [] [] [] []  5 4 3 2 1 1 2 3 4 5                         Right                                        Left    Inflammation/Pain   [x] [x] [x] [x] [x] [x] [x] [x] [x] [x]  5 4 3 2 1 1 2 3 4 5                         Right                                        Left      Dermatologic Exam:  Skin lesion present to the Left and right plantar foot with a central core and petchaie noted to the lesions central aspect. Pain on palpation to the right and left soft tissue lesion. Musculoskeletal:     1st MPJ ROM decreased, Bilateral.  Muscle strength 5/5, Bilateral.  Pain present upon palpation of toenails 1-5, Bilateral. decreased medial longitudinal arch, Bilateral.  Ankle ROM decreased,Bilateral.    Dorsally contracted digits present digits 2, Bilateral.     Vascular: DP and PT pulses palpable 1/4, Bilateral.  CFT <5 seconds, Bilateral.  Hair growth absent to the level of the digits, Bilateral.  Edema present, Bilateral.  Varicosities absent, Bilateral. Erythema absent, Bilateral    Neurological: Sensation diminshed to light touch to level of digits, Bilateral.  Protective sensation intact 6/10 sites via 5.07/10g [place] Monofilament, Bilateral.  negative Tinel's, Bilateral.  negative Valleix sign, Bilateral.      Integument: Warm, dry, supple, Bilateral.  Open lesion absent, Bilateral.  Interdigital maceration absent to web spaces 4, Bilateral.  Nails 1-5 left and 1-5 right thickened > 3.0 mm, dystrophic and crumbly, discolored with subungual debris.   Fissures absent, Bilateral.        Visual inspection:  Deformity: hammertoe deformity yudy feet  amputation: absent  Skin lesions: as above  Edema: right- 2+ pittingedema, left- 2+ pitting edema  Sensory exam:  Monofilament sensation: abnormal - 6/10 via SW 5.07/10g monofilament to the plantar foot bilateral feet  Pulses: abnormal - 1/4 dorsalis pedis pulse and 1/4Posterior tibial pulse,   Pinprick: Impaired  Proprioception: Impaired  Vibration (128 Hz): Impaired       Assessment:  70 y.o. female with:   Diagnosis Orders   1. Type II diabetes mellitus with peripheral circulatory disorder (HCC)  54957 - [place] DEBRIDEMENT OF NAILS, 6 OR MORE    HM DIABETES FOOT EXAM    47876 - [place] DESTRUCTION BENIGN LESIONS UP TO 14   2. Dermatophytosis of nail  53632 - [place] DEBRIDEMENT OF NAILS, 6 OR MORE    HM DIABETES FOOT EXAM   3. PVD (peripheral vascular disease) ([facility] [address]  06466 - [place] DEBRIDEMENT OF NAILS, 6 OR MORE    HM DIABETES FOOT EXAM    90370 - [place] DESTRUCTION BENIGN LESIONS UP TO 14   4. Benign neoplasm of skin of lower limb, including hip, right  HM DIABETES FOOT EXAM    20254 - [place] DESTRUCTION BENIGN LESIONS UP TO 14   5. Benign neoplasm of skin of lower limb, including hip, left  HM DIABETES FOOT EXAM    83123 - [place] DESTRUCTION BENIGN LESIONS UP TO 14         Q7   []Yes  []No                Q8   [x]Yes  []No                     Q9   []Yes    []No    Plan:   Pt was evaluated and examined. Patient was given personalized discharge instructions. Nails 1-10 were debrided sharply in length and thickness with anipper and , without incident. Pt will follow up in 2 months or sooner if any problems arise. Diagnosis was discussed with the pt and all of their questions were answered indetail. Proper foot hygiene and care was discussed with the pt. Informed patient on proper diabetic foot care and importance of tight glycemic control. Patient to check feet daily and contact the office with anyquestions/problems/concerns. Other comorbidity noted and will be managed by PCP.  5/23/2019    The lesion was partially debrided and silver nitratewas applied with an apperature pad under occlusion. The patient will leave in place for 24-48 hours and than remove. The patient tolerated the procedure well and without complication.    Pt will follow up in 2weeks       Electronically signed by Meli Monsalve DPM on 5/23/2019 at 1:24 PM  5/23/2019

## 2019-05-28 ENCOUNTER — HOSPITAL ENCOUNTER (OUTPATIENT)
Dept: PHARMACY | Age: 69
Setting detail: THERAPIES SERIES
Discharge: HOME | End: 2019-05-28
Payer: MEDICARE

## 2019-05-28 DIAGNOSIS — I82.432 DEEP VEIN THROMBOSIS (DVT) OF POPLITEAL VEIN OF LEFT LOWER EXTREMITY, UNSPECIFIED CHRONICITY (HCC): ICD-10-CM

## 2019-05-28 DIAGNOSIS — I26.99 OTHER PULMONARY EMBOLISM WITHOUT ACUTE COR PULMONALE, UNSPECIFIED CHRONICITY (HCC): ICD-10-CM

## 2019-05-28 LAB
INR BLD: 2
PROTIME: 23.4 SECONDS

## 2019-05-28 PROCEDURE — 85610 PROTHROMBIN TIME: CPT

## 2019-05-28 PROCEDURE — 99213 OFFICE O/P EST LOW 20 MIN: CPT

## 2019-06-10 ENCOUNTER — OFFICE VISIT (OUTPATIENT)
Dept: PRIMARY CARE CLINIC | Age: 69
End: 2019-06-10
Payer: MEDICARE

## 2019-06-10 VITALS
SYSTOLIC BLOOD PRESSURE: 136 MMHG | HEIGHT: 64 IN | DIASTOLIC BLOOD PRESSURE: 82 MMHG | WEIGHT: 176.8 LBS | OXYGEN SATURATION: 93 % | RESPIRATION RATE: 15 BRPM | BODY MASS INDEX: 30.19 KG/M2 | HEART RATE: 97 BPM

## 2019-06-10 DIAGNOSIS — R60.9 EDEMA, UNSPECIFIED TYPE: ICD-10-CM

## 2019-06-10 DIAGNOSIS — F41.9 ANXIETY: ICD-10-CM

## 2019-06-10 DIAGNOSIS — Z78.0 POST-MENOPAUSAL: ICD-10-CM

## 2019-06-10 DIAGNOSIS — E11.65 TYPE 2 DIABETES MELLITUS WITH HYPERGLYCEMIA, WITHOUT LONG-TERM CURRENT USE OF INSULIN (HCC): Primary | ICD-10-CM

## 2019-06-10 PROCEDURE — 3017F COLORECTAL CA SCREEN DOC REV: CPT | Performed by: NURSE PRACTITIONER

## 2019-06-10 PROCEDURE — 1123F ACP DISCUSS/DSCN MKR DOCD: CPT | Performed by: NURSE PRACTITIONER

## 2019-06-10 PROCEDURE — G8400 PT W/DXA NO RESULTS DOC: HCPCS | Performed by: NURSE PRACTITIONER

## 2019-06-10 PROCEDURE — 1090F PRES/ABSN URINE INCON ASSESS: CPT | Performed by: NURSE PRACTITIONER

## 2019-06-10 PROCEDURE — G8427 DOCREV CUR MEDS BY ELIG CLIN: HCPCS | Performed by: NURSE PRACTITIONER

## 2019-06-10 PROCEDURE — 1036F TOBACCO NON-USER: CPT | Performed by: NURSE PRACTITIONER

## 2019-06-10 PROCEDURE — 4040F PNEUMOC VAC/ADMIN/RCVD: CPT | Performed by: NURSE PRACTITIONER

## 2019-06-10 PROCEDURE — 3044F HG A1C LEVEL LT 7.0%: CPT | Performed by: NURSE PRACTITIONER

## 2019-06-10 PROCEDURE — G8417 CALC BMI ABV UP PARAM F/U: HCPCS | Performed by: NURSE PRACTITIONER

## 2019-06-10 PROCEDURE — 99214 OFFICE O/P EST MOD 30 MIN: CPT | Performed by: NURSE PRACTITIONER

## 2019-06-10 PROCEDURE — 2022F DILAT RTA XM EVC RTNOPTHY: CPT | Performed by: NURSE PRACTITIONER

## 2019-06-10 RX ORDER — DIPHENHYDRAMINE HCL 25 MG
25 TABLET ORAL EVERY 6 HOURS PRN
COMMUNITY
End: 2019-09-23 | Stop reason: ALTCHOICE

## 2019-06-10 RX ORDER — FUROSEMIDE 20 MG/1
20 TABLET ORAL DAILY
Qty: 30 TABLET | Refills: 0 | Status: SHIPPED | OUTPATIENT
Start: 2019-06-10 | End: 2019-07-15 | Stop reason: SDUPTHER

## 2019-06-10 ASSESSMENT — ENCOUNTER SYMPTOMS
COUGH: 0
SHORTNESS OF BREATH: 0
BACK PAIN: 0
CONSTIPATION: 1
ABDOMINAL PAIN: 0

## 2019-06-10 ASSESSMENT — PATIENT HEALTH QUESTIONNAIRE - PHQ9
SUM OF ALL RESPONSES TO PHQ QUESTIONS 1-9: 0
SUM OF ALL RESPONSES TO PHQ9 QUESTIONS 1 & 2: 0
SUM OF ALL RESPONSES TO PHQ QUESTIONS 1-9: 0
1. LITTLE INTEREST OR PLEASURE IN DOING THINGS: 0
2. FEELING DOWN, DEPRESSED OR HOPELESS: 0

## 2019-06-10 NOTE — PROGRESS NOTES
[facility] PRIMARY CARE  [address] [place] [address] [address]  [address]  [facility] [address]  Dept: [phone]  Dept Fax: [phone]    Brad Lee is a 70 y.o. female who presentstoday for her medical conditions/complaints as noted below. Brad Lee is c/o of  Chief Complaint   Patient presents with    Hypertension     3 month recheck            HPI:     Presents with  Cabrera for 3 month recheck  Concerned regarding fluid retention  Feels like she can breathe better and fluid retention is controlled better at 173lb or less  Requesting renewal on lasix  Reviewed labs from 3/2019- kidney function WNL  Hga1c 6.6. States BS up to 190 at home, post prandial      Anxiety well controlled with ativan at current dose  Has tried CBD oil for joint pain with noted improvement  Also using cream OTC, eucalyptus in it that has improved the pain      Hemoglobin A1C (%)   Date Value   03/11/2019 6.6 (H)   07/30/2018 6.9   05/14/2018 6.9 (H)             ( goal A1C is < 7)   No results found for: LABMICR  LDL Cholesterol (mg/dL)   Date Value   03/11/2019 77     LDL Calculated (mg/dL)   Date Value   11/13/2017 74       (goal LDL is <100)   AST (U/L)   Date Value   03/11/2019 13     ALT (U/L)   Date Value   03/11/2019 17     BUN (mg/dL)   Date Value   03/11/2019 16     BP Readings from Last 3 Encounters:   06/10/19 136/82   03/11/19 (!) 144/88   02/22/19 96/62          ([id number])    Past Medical History:   Diagnosis Date    Acute respiratory failure (HCC)     Anemia     Blood transfusion    Anxiety     Chronic kidney disease     GERD (gastroesophageal reflux disease)     H/O transfusion 02/2018    Anemia    Hiatal hernia     Hyperlipidemia     Hypertension 1998    on Rx.     Neuropathy     Osteoarthritis     Pulmonary embolism ([facility] [address] 2014    on coumadin    RLS (restless legs syndrome)     Type 2 diabetes mellitus without complication ([address] 1638    on metformin    Vocal cord paralysis 2014    [facility]ocal cord surgery in [facility]    Wears glasses       Past Surgical History:   Procedure Laterality Date    BREAST REDUCTION SURGERY  '90's    COLONOSCOPY  2016    no polyp    GASTRIC FUNDOPLICATION  59/36/3472    robotic assisted hiatal hernia repair    HAMMER TOE SURGERY Bilateral 2000, 2003    HIP ARTHROPLASTY Bilateral 2001, 2005    NC LAP,ESOPHAGOGAST FUNDOPLASTY N/A 5/21/2018    XI ROBOTIC HIATAL HERNIA REPAIR WITH NISSEN FUNDOPLICATION LAPAROSCOPIC, ENDOSEALER performed by Sherrilyn Cabot, MD at [address]  2016    VOCAL CORD AUGMENTATION W/RADIESSE INJ  09/30/2014    vocal cord surgeryi       Family History   Problem Relation Age of Onset    Kidney Disease Mother         Dialysis    Liver Cancer Father     Arthritis Brother     Diabetes Maternal Grandmother     No Known Problems Maternal Grandfather     No Known Problems Paternal Grandmother     No Known Problems Paternal Grandfather     Diabetes Maternal Aunt     Scoliosis Son           Social History     Tobacco Use    Smoking status: Never Smoker    Smokeless tobacco: Never Used   Substance Use Topics    Alcohol use: No      Current Outpatient Medications   Medication Sig Dispense Refill    diphenhydrAMINE (BENADRYL) 25 MG tablet Take 25 mg by mouth every 6 hours as needed for Itching      metFORMIN (GLUCOPHAGE) 500 MG tablet TAKE 1 TABLET BY MOUTH TWICE DAILY WITH MEALS 180 tablet 1    furosemide (LASIX) 20 MG tablet Take 1 tablet by mouth daily 30 tablet 0    LORazepam (ATIVAN) 0.5 MG tablet TAKE 1 TABLET BY MOUTH EVERY 12 HOURS AS NEEDED FOR ANXIETY 60 tablet 2    lisinopril-hydrochlorothiazide (PRINZIDE;ZESTORETIC) 20-25 MG per tablet Take 1 tablet by mouth daily      atorvastatin (LIPITOR) 20 MG tablet TAKE 1 TABLET BY MOUTH ONCE DAILY 90 tablet 1    vitamin E 400 UNIT capsule Take 400 Units by mouth daily      blood glucose test strips (ACCU-CHEK MICHELE PLUS) strip USE  STRIP TO CHECK GLUCOSE TWICE DAILY. Dx: E11.65 Type 2 DM with Hyperglycemia 100 strip 1    ACCU-CHEK SOFTCLIX LANCETS [facility] USE TO CHECK GLUCOSE TWICE DAILY. Dx: E11.65 Type 2 DM with hyperglycemia 100 each 1    warfarin (COUMADIN) 3 MG tablet Take 3 mg by mouth See Admin Instructions Managed by [facility] Anticoagulation Service: 1.5 mg Mondays/ Thursdays and 3 mg 5x weekly      ferrous sulfate 325 (65 Fe) MG tablet Take 1 tablet by mouth daily (with breakfast) 30 tablet 0    acetaminophen (TYLENOL ARTHRITIS PAIN) 650 MG extended release tablet Take 1,300 mg by mouth 3 times daily       Alcohol Swabs PADS Need to use  twice a day 100 each 3    Blood Glucose Monitoring Suppl FLAVIA Glucometer Accu check. Test Blood Sugars 2 Times Daily. Dx: E 11.22, N 18.3 type II DM 1 Device 0     No current facility-administered medications for this visit. No Known Allergies    Health Maintenance   Topic Date Due    DTaP/Tdap/Td vaccine (1 - Tdap) 09/28/1968    Shingles Vaccine (1 of 2) 09/28/1999    Colon cancer screen colonoscopy  09/28/1999    DEXA (modify frequency per FRAX score)  09/28/2014    Pneumococcal 65+ years Vaccine (1 of 2 - PCV13) 09/28/2014    Diabetic retinal exam  12/11/2018    Diabetic microalbuminuria test  02/13/2019    Breast cancer screen  08/15/2019    A1C test (Diabetic or Prediabetic)  03/11/2020    Lipid screen  03/11/2020    Potassium monitoring  03/11/2020    Creatinine monitoring  03/11/2020    Diabetic foot exam  05/23/2020    Flu vaccine  Completed    Hepatitis C screen  Completed       Subjective:      Review of Systems   Constitutional: Negative for chills, fatigue and fever. HENT: Negative for congestion. Eyes: Negative for visual disturbance (\"my eyes are tired\" due for diabetic eye exam). Respiratory: Negative for cough and shortness of breath (with fluid retention). Cardiovascular: Positive for leg swelling.  Negative for chest pain and palpitations. Gastrointestinal: Positive for constipation (using laxative and miralax). Negative for abdominal pain. Genitourinary: Negative for dysuria. Musculoskeletal: Positive for arthralgias. Negative for back pain. Neurological: Positive for headaches (allergy related). Negative for dizziness (off balance) and numbness. Psychiatric/Behavioral: Negative for self-injury, sleep disturbance and suicidal ideas. The patient is not nervous/anxious. Objective:     Physical Exam   Constitutional: She is oriented to person, place, and time. She appears well-developed and well-nourished. HENT:   Head: Normocephalic and atraumatic. Eyes: Pupils are equal, round, and reactive to light. Neck: Normal range of motion. Neck supple. Cardiovascular: Normal rate, regular rhythm and normal heart sounds. Pulmonary/Chest: Effort normal and breath sounds normal.   Abdominal: Soft. Bowel sounds are normal. There is no tenderness. Musculoskeletal: Normal range of motion. Neurological: She is alert and oriented to person, place, and time. Skin: Skin is warm and dry. Psychiatric: She has a normal mood and affect. Her behavior is normal. Judgment and thought content normal.   Nursing note and vitals reviewed. /82   Pulse 97   Resp 15   Ht 5' 4\" (1.626 m)   Wt 176 lb 12.8 oz (80.2 kg)   SpO2 93%   Breastfeeding? No   BMI 30.35 kg/m²     Assessment:       Diagnosis Orders   1. Type 2 diabetes mellitus with hyperglycemia, without long-term current use of insulin (HCC)  Microalbumin, Ur    Creatinine, Random Urine   2. Post-menopausal     3. Anxiety     4. Edema, unspecified type               Plan:      Return in about 3 months (around 9/10/2019) for hypertension. 1. DM/HTN/Anxiety- Stable. Continue current meds. Follow up in 3 months for recheck and repeat Hga1c.   2. Edema- Rx given for lasix to use prn, goal weight 173lb. Follow up in 3 months for recheck.     Of the 25 minute duration

## 2019-06-11 ENCOUNTER — HOSPITAL ENCOUNTER (OUTPATIENT)
Dept: PHARMACY | Age: 69
Setting detail: THERAPIES SERIES
Discharge: HOME | End: 2019-06-11
Payer: MEDICARE

## 2019-06-11 LAB
INR BLD: 2.5
PROTIME: 29.7 SECONDS

## 2019-06-11 PROCEDURE — 85610 PROTHROMBIN TIME: CPT

## 2019-06-11 PROCEDURE — 99211 OFF/OP EST MAY X REQ PHY/QHP: CPT

## 2019-06-11 NOTE — PROGRESS NOTES
Patient states compliant all of the time with regimen. No bleeding or thromboembolic side effects noted. No significant dietary changes. No significant recent illness or disease state changes. Medication list reviewed. Saw PCP yesterday and was started on Lasix. Has not started it yet, but plans to take first dose when she returns home today. PT/INR done in office per protocol. INR was therapeutic at 2.5 (goal 23). Warfarin regimen will be continued at current dose 1.5 mg MWF and 3 mg all other days. Will retest in 4 weeks. Patient understands dosing directions and information discussed. Dosing schedule and follow up appointment given to patient. Progress note routed to referring physicians office. Patient acknowledges working in [address] [place] [address] with Pharmacist as referred by his/her physician.

## 2019-07-09 ENCOUNTER — HOSPITAL ENCOUNTER (OUTPATIENT)
Dept: PHARMACY | Age: 69
Setting detail: THERAPIES SERIES
Discharge: HOME | End: 2019-07-09
Payer: MEDICARE

## 2019-07-09 DIAGNOSIS — I82.432 DEEP VEIN THROMBOSIS (DVT) OF POPLITEAL VEIN OF LEFT LOWER EXTREMITY, UNSPECIFIED CHRONICITY (HCC): ICD-10-CM

## 2019-07-09 DIAGNOSIS — I26.99 OTHER PULMONARY EMBOLISM WITHOUT ACUTE COR PULMONALE, UNSPECIFIED CHRONICITY (HCC): ICD-10-CM

## 2019-07-09 LAB
INR BLD: 2.3
PROTIME: 27.9 SECONDS

## 2019-07-09 PROCEDURE — 85610 PROTHROMBIN TIME: CPT

## 2019-07-09 PROCEDURE — 99211 OFF/OP EST MAY X REQ PHY/QHP: CPT

## 2019-07-09 NOTE — PROGRESS NOTES
Patient states compliant all of the time with regimen. Medication list reviewed. No changes. No bleeding or thromboembolic side effects noted. Diet, use of Vitamin K reviewed. No significant changes. No significant recent illness or disease state changes. No upcomming surgeries or procedures. New prescription for warfarin needed? No    PT/INR done in office per protocol. INR was therapeutic. INR = 2.3, goal range 2-3    Warfarin regimen will be continued at current dose 1.5 mg MWF and 3 mg all other days. Will retest in 4 weeks. Patient understands dosing directions and information discussed. Dosing schedule and follow up appointment given to patient. Progress note routed to referring physicians office. 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

## 2019-07-15 RX ORDER — FUROSEMIDE 40 MG/1
40 TABLET ORAL DAILY
Qty: 30 TABLET | Refills: 0 | Status: SHIPPED | OUTPATIENT
Start: 2019-07-15 | End: 2019-08-12 | Stop reason: SDUPTHER

## 2019-07-15 RX ORDER — POTASSIUM CHLORIDE 20 MEQ/1
20 TABLET, EXTENDED RELEASE ORAL DAILY
Qty: 30 TABLET | Refills: 5 | Status: SHIPPED | OUTPATIENT
Start: 2019-07-15 | End: 2019-09-23 | Stop reason: ALTCHOICE

## 2019-07-15 NOTE — TELEPHONE ENCOUNTER
called, requesting dose be increased, states it worked first couple days but after that she is still holding water in her legs, feet, and ankles. He states she drinks aot of Propel drinks and water.

## 2019-07-22 RX ORDER — WARFARIN SODIUM 3 MG/1
TABLET ORAL
Qty: 84 TABLET | Refills: 7 | Status: SHIPPED | OUTPATIENT
Start: 2019-07-22 | End: 2019-09-23 | Stop reason: ALTCHOICE

## 2019-07-22 NOTE — TELEPHONE ENCOUNTER
(primary) hypertension     Anxiety     Gastroesophageal reflux 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     Coagulopathy (HCC)     DVT of popliteal vein (HCC)     Benign essential HTN     Failure to wean from mechanical ventilation (HCC)     Acid reflux     History of repair of hip joint     Osteoarthritis     Respiratory failure (HCC)     Decreased muscle tone     Scar condition and fibrosis of skin     Lumbar canal stenosis     Type 2 diabetes mellitus with hyperglycemia, without long-term current use of insulin (HCC)     Pneumonia     Sepsis (HCC)     Constipation     BLANE (acute kidney injury) ([facility] [address]     Hx of pulmonary embolus     Moderate malnutrition (HCC)     Pneumonia due to organism     Supratherapeutic INR     Hiatal hernia

## 2019-07-25 ENCOUNTER — OFFICE VISIT (OUTPATIENT)
Dept: PODIATRY | Age: 69
End: 2019-07-25
Payer: MEDICARE

## 2019-07-25 VITALS — BODY MASS INDEX: 30.83 KG/M2 | HEIGHT: 63 IN | WEIGHT: 174 LBS

## 2019-07-25 DIAGNOSIS — M79.605 PAIN IN BOTH LOWER EXTREMITIES: ICD-10-CM

## 2019-07-25 DIAGNOSIS — L84 HELOMA MOLLE: ICD-10-CM

## 2019-07-25 DIAGNOSIS — M20.41 HAMMER TOES OF BOTH FEET: ICD-10-CM

## 2019-07-25 DIAGNOSIS — E11.51 TYPE II DIABETES MELLITUS WITH PERIPHERAL CIRCULATORY DISORDER (HCC): Primary | ICD-10-CM

## 2019-07-25 DIAGNOSIS — M79.604 PAIN IN BOTH LOWER EXTREMITIES: ICD-10-CM

## 2019-07-25 DIAGNOSIS — M20.42 HAMMER TOES OF BOTH FEET: ICD-10-CM

## 2019-07-25 DIAGNOSIS — I73.9 PVD (PERIPHERAL VASCULAR DISEASE) (HCC): ICD-10-CM

## 2019-07-25 PROCEDURE — G8417 CALC BMI ABV UP PARAM F/U: HCPCS | Performed by: PODIATRIST

## 2019-07-25 PROCEDURE — 2022F DILAT RTA XM EVC RTNOPTHY: CPT | Performed by: PODIATRIST

## 2019-07-25 PROCEDURE — 1036F TOBACCO NON-USER: CPT | Performed by: PODIATRIST

## 2019-07-25 PROCEDURE — 4040F PNEUMOC VAC/ADMIN/RCVD: CPT | Performed by: PODIATRIST

## 2019-07-25 PROCEDURE — 1123F ACP DISCUSS/DSCN MKR DOCD: CPT | Performed by: PODIATRIST

## 2019-07-25 PROCEDURE — 1090F PRES/ABSN URINE INCON ASSESS: CPT | Performed by: PODIATRIST

## 2019-07-25 PROCEDURE — G8400 PT W/DXA NO RESULTS DOC: HCPCS | Performed by: PODIATRIST

## 2019-07-25 PROCEDURE — G8427 DOCREV CUR MEDS BY ELIG CLIN: HCPCS | Performed by: PODIATRIST

## 2019-07-25 PROCEDURE — 17110 DESTRUCTION B9 LES UP TO 14: CPT | Performed by: PODIATRIST

## 2019-07-25 PROCEDURE — 3017F COLORECTAL CA SCREEN DOC REV: CPT | Performed by: PODIATRIST

## 2019-07-25 PROCEDURE — 3044F HG A1C LEVEL LT 7.0%: CPT | Performed by: PODIATRIST

## 2019-07-25 RX ORDER — LORAZEPAM 0.5 MG/1
TABLET ORAL
Refills: 2 | COMMUNITY
Start: 2019-06-24 | End: 2019-08-26 | Stop reason: SDUPTHER

## 2019-07-25 NOTE — PROGRESS NOTES
[facility] PODIATRY  [id number] [place] [address] [address]  Dept: [phone]    DIABETIC PROGRESS NOTE  Date of patient's visit: 7/25/2019  Patient's Name:  Meño Arndt YOB: 1950            Patient Care Team:  RANDY Bermudez CNP as PCP - General (Nurse Practitioner)  RANDY Bermudez CNP as PCP - [facility] Empaneled Provider  Dino Jimenez MD as Consulting Physician (Nephrology)  Bishop Juan Pablo DPM as Consulting Physician ([place])  Breana Pierson MD as Consulting Physician (Rheumatology)  Lesvia Watkins MD as Surgeon (Bariatric Surgery)  Colby Arredondo DO as Consulting Physician (Pulmonology)  Arabella Johnson DPM as Physician (Podiatry)          Chief Complaint   Patient presents with    Diabetes    Nail Problem    Circulatory Problem       Subjective:   Meño Ardnt comes to clinic for Diabetes; Nail Problem; and Circulatory Problem    she is a diabetic and states that left 4th and 5th toes are painful. .  Pt currently has complaint of painful skin lesions to bottom of left foot. .   Pt's primary care physician is RANDY Hamilton CNP last seen Heidy 10 2019   Pt's last blood sugar was  132 . Lab Results   Component Value Date    LABA1C 6.6 (H) 03/11/2019      Complains of numbness in the feet bilat. Past Medical History:   Diagnosis Date    Acute respiratory failure ([facility] [address]     Anemia     Blood transfusion    Anxiety     Chronic kidney disease     GERD (gastroesophageal reflux disease)     H/O transfusion 02/2018    Anemia    Hiatal hernia     Hyperlipidemia     Hypertension 1998    on Rx.     Neuropathy     Osteoarthritis     Pulmonary embolism ([facility] [address] 2014    on coumadin    RLS (restless legs syndrome)     Type 2 diabetes mellitus without complication ([facility] [address] 5275    on metformin    Vocal cord paralysis 2014    [facility]ocal cord surgery in [facility]    Wears glasses        No Known

## 2019-08-05 ENCOUNTER — HOSPITAL ENCOUNTER (OUTPATIENT)
Dept: PHARMACY | Age: 69
Setting detail: THERAPIES SERIES
Discharge: HOME | End: 2019-08-05
Payer: MEDICARE

## 2019-08-05 DIAGNOSIS — I82.432 DEEP VEIN THROMBOSIS (DVT) OF POPLITEAL VEIN OF LEFT LOWER EXTREMITY, UNSPECIFIED CHRONICITY (HCC): ICD-10-CM

## 2019-08-05 DIAGNOSIS — I26.99 OTHER PULMONARY EMBOLISM WITHOUT ACUTE COR PULMONALE, UNSPECIFIED CHRONICITY (HCC): ICD-10-CM

## 2019-08-05 LAB
INR BLD: 2.2
PROTIME: 25.8 SECONDS

## 2019-08-05 PROCEDURE — 99211 OFF/OP EST MAY X REQ PHY/QHP: CPT

## 2019-08-05 PROCEDURE — 85610 PROTHROMBIN TIME: CPT

## 2019-08-12 ENCOUNTER — TELEPHONE (OUTPATIENT)
Dept: PRIMARY CARE CLINIC | Age: 69
End: 2019-08-12

## 2019-08-12 RX ORDER — ATORVASTATIN CALCIUM 20 MG/1
TABLET, FILM COATED ORAL
Qty: 90 TABLET | Refills: 1 | Status: SHIPPED | OUTPATIENT
Start: 2019-08-12 | End: 2020-01-14 | Stop reason: SDUPTHER

## 2019-08-12 RX ORDER — FUROSEMIDE 40 MG/1
TABLET ORAL
Qty: 30 TABLET | Refills: 0 | Status: SHIPPED | OUTPATIENT
Start: 2019-08-12 | End: 2019-09-23 | Stop reason: ALTCHOICE

## 2019-08-12 NOTE — TELEPHONE ENCOUNTER
(primary) hypertension     Anxiety     Gastroesophageal reflux 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     Coagulopathy (HCC)     DVT of popliteal vein (HCC)     Benign essential HTN     Failure to wean from mechanical ventilation (HCC)     Acid reflux     History of repair of hip joint     Osteoarthritis     Respiratory failure (HCC)     Decreased muscle tone     Scar condition and fibrosis of skin     Lumbar canal stenosis     Type 2 diabetes mellitus with hyperglycemia, without long-term current use of insulin (HCC)     Pneumonia     Sepsis (HCC)     Constipation     BLANE (acute kidney injury) ([facility] [address]     Hx of pulmonary embolus     Moderate malnutrition (HCC)     Pneumonia due to organism     Supratherapeutic INR     Hiatal hernia

## 2019-08-12 NOTE — TELEPHONE ENCOUNTER
Last ov 6/10/19    Health Maintenance   Topic Date Due    DTaP/Tdap/Td vaccine (1 - Tdap) 09/28/1968    Shingles Vaccine (1 of 2) 09/28/1999    Annual Wellness Visit (AWV)  09/28/2012    DEXA (modify frequency per FRAX score)  09/28/2014    Pneumococcal 65+ years Vaccine (1 of 2 - PCV13) 09/28/2014    Diabetic retinal exam  12/11/2018    Diabetic microalbuminuria test  02/13/2019    Breast cancer screen  08/15/2019    Flu vaccine (1) 09/01/2019    A1C test (Diabetic or Prediabetic)  03/11/2020    Lipid screen  03/11/2020    Potassium monitoring  03/11/2020    Creatinine monitoring  03/11/2020    Diabetic foot exam  05/23/2020    Colon cancer screen colonoscopy  03/10/2026    Hepatitis C screen  Completed             (applicable per patient's age: Cancer Screenings, Depression Screening, Fall Risk Screening, Immunizations)    Hemoglobin A1C (%)   Date Value   03/11/2019 6.6 (H)   07/30/2018 6.9   05/14/2018 6.9 (H)     LDL Cholesterol (mg/dL)   Date Value   03/11/2019 77     LDL Calculated (mg/dL)   Date Value   11/13/2017 74     AST (U/L)   Date Value   03/11/2019 13     ALT (U/L)   Date Value   03/11/2019 17     BUN (mg/dL)   Date Value   03/11/2019 16      (goal A1C is < 7)   (goal LDL is <100) need 30-50% reduction from baseline     BP Readings from Last 3 Encounters:   06/10/19 136/82   03/11/19 (!) 144/88   02/22/19 96/62    (goal /80)      All Future Testing planned in [facility]:  Lab Frequency Next Occurrence   RAUL DIGITAL SCREEN W CAD BILATERAL Once 02/22/2020   Microalbumin, Ur Once 06/09/2020   Creatinine, Random Urine Once 06/09/2020       Next Visit Date:  Future Appointments   Date Time Provider Rachael Garsia   9/9/2019  8:30 AM [facility]   9/16/2019  9:40 AM Meryle Dec, APRN - CNP [facility]   9/26/2019  1:30 PM Inga Clark DPM [facility]            Patient Active Problem List:     Mixed hyperlipidemia     Essential (primary) hypertension     Anxiety     Gastroesophageal reflux 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     Coagulopathy (HCC)     DVT of popliteal vein (HCC)     Benign essential HTN     Failure to wean from mechanical ventilation (HCC)     Acid reflux     History of repair of hip joint     Osteoarthritis     Respiratory failure (HCC)     Decreased muscle tone     Scar condition and fibrosis of skin     Lumbar canal stenosis     Type 2 diabetes mellitus with hyperglycemia, without long-term current use of insulin (HCC)     Pneumonia     Sepsis (HCC)     Constipation     BLANE (acute kidney injury) ([facility] [address]     Hx of pulmonary embolus     Moderate malnutrition (HCC)     Pneumonia due to organism     Supratherapeutic INR     Hiatal hernia

## 2019-08-14 ENCOUNTER — TELEPHONE (OUTPATIENT)
Dept: SURGERY | Age: 69
End: 2019-08-14

## 2019-08-14 ENCOUNTER — OFFICE VISIT (OUTPATIENT)
Dept: PRIMARY CARE CLINIC | Age: 69
End: 2019-08-14
Payer: MEDICARE

## 2019-08-14 VITALS
BODY MASS INDEX: 29.78 KG/M2 | WEIGHT: 174.4 LBS | HEIGHT: 64 IN | OXYGEN SATURATION: 98 % | HEART RATE: 82 BPM | DIASTOLIC BLOOD PRESSURE: 72 MMHG | SYSTOLIC BLOOD PRESSURE: 112 MMHG | RESPIRATION RATE: 15 BRPM

## 2019-08-14 DIAGNOSIS — Z12.31 ENCOUNTER FOR SCREENING MAMMOGRAM FOR BREAST CANCER: ICD-10-CM

## 2019-08-14 DIAGNOSIS — E11.65 TYPE 2 DIABETES MELLITUS WITH HYPERGLYCEMIA, WITHOUT LONG-TERM CURRENT USE OF INSULIN (HCC): Primary | ICD-10-CM

## 2019-08-14 DIAGNOSIS — R60.9 EDEMA, UNSPECIFIED TYPE: ICD-10-CM

## 2019-08-14 DIAGNOSIS — Z78.0 POST-MENOPAUSAL: ICD-10-CM

## 2019-08-14 DIAGNOSIS — I82.432 DEEP VEIN THROMBOSIS (DVT) OF POPLITEAL VEIN OF LEFT LOWER EXTREMITY, UNSPECIFIED CHRONICITY (HCC): ICD-10-CM

## 2019-08-14 LAB — HBA1C MFR BLD: 10.1 %

## 2019-08-14 PROCEDURE — G8427 DOCREV CUR MEDS BY ELIG CLIN: HCPCS | Performed by: NURSE PRACTITIONER

## 2019-08-14 PROCEDURE — 1123F ACP DISCUSS/DSCN MKR DOCD: CPT | Performed by: NURSE PRACTITIONER

## 2019-08-14 PROCEDURE — 1090F PRES/ABSN URINE INCON ASSESS: CPT | Performed by: NURSE PRACTITIONER

## 2019-08-14 PROCEDURE — G8417 CALC BMI ABV UP PARAM F/U: HCPCS | Performed by: NURSE PRACTITIONER

## 2019-08-14 PROCEDURE — 2022F DILAT RTA XM EVC RTNOPTHY: CPT | Performed by: NURSE PRACTITIONER

## 2019-08-14 PROCEDURE — G8400 PT W/DXA NO RESULTS DOC: HCPCS | Performed by: NURSE PRACTITIONER

## 2019-08-14 PROCEDURE — 3046F HEMOGLOBIN A1C LEVEL >9.0%: CPT | Performed by: NURSE PRACTITIONER

## 2019-08-14 PROCEDURE — 4040F PNEUMOC VAC/ADMIN/RCVD: CPT | Performed by: NURSE PRACTITIONER

## 2019-08-14 PROCEDURE — 1036F TOBACCO NON-USER: CPT | Performed by: NURSE PRACTITIONER

## 2019-08-14 PROCEDURE — 3017F COLORECTAL CA SCREEN DOC REV: CPT | Performed by: NURSE PRACTITIONER

## 2019-08-14 PROCEDURE — 99215 OFFICE O/P EST HI 40 MIN: CPT | Performed by: NURSE PRACTITIONER

## 2019-08-14 PROCEDURE — 83036 HEMOGLOBIN GLYCOSYLATED A1C: CPT | Performed by: NURSE PRACTITIONER

## 2019-08-14 ASSESSMENT — ENCOUNTER SYMPTOMS
SINUS PRESSURE: 1
ABDOMINAL PAIN: 0
SORE THROAT: 1
SHORTNESS OF BREATH: 1
TROUBLE SWALLOWING: 1
COUGH: 0
BACK PAIN: 0
SINUS PAIN: 1

## 2019-08-14 ASSESSMENT — PATIENT HEALTH QUESTIONNAIRE - PHQ9
SUM OF ALL RESPONSES TO PHQ9 QUESTIONS 1 & 2: 0
SUM OF ALL RESPONSES TO PHQ QUESTIONS 1-9: 0
SUM OF ALL RESPONSES TO PHQ QUESTIONS 1-9: 0
2. FEELING DOWN, DEPRESSED OR HOPELESS: 0
1. LITTLE INTEREST OR PLEASURE IN DOING THINGS: 0

## 2019-08-16 ENCOUNTER — TELEPHONE (OUTPATIENT)
Dept: PRIMARY CARE CLINIC | Age: 69
End: 2019-08-16

## 2019-08-16 ENCOUNTER — TELEPHONE (OUTPATIENT)
Dept: PHARMACY | Age: 69
End: 2019-08-16

## 2019-08-16 DIAGNOSIS — D68.9 COAGULOPATHY (HCC): Primary | ICD-10-CM

## 2019-08-16 PROBLEM — I26.99 PULMONARY EMBOLISM (HCC): Status: RESOLVED | Noted: 2017-03-13 | Resolved: 2019-08-16

## 2019-08-16 NOTE — TELEPHONE ENCOUNTER
Received letter from Chris Tejeda CNP that warfarin to be discontinued and aspirin started  The patient had a clot in her leg five years ago that led to a PE  There are no clotting disorders in the blood per her   The clot was possibly provoked by sitting long periods with her leg tucked up under her body while knitting  The patient will be discharged from our clinic  [facility] office was notified that [facility] are not necessary   After the warfarin is discontinued

## 2019-08-19 ENCOUNTER — TELEPHONE (OUTPATIENT)
Dept: PRIMARY CARE CLINIC | Age: 69
End: 2019-08-19

## 2019-08-19 ENCOUNTER — TELEPHONE (OUTPATIENT)
Dept: PHARMACY | Age: 69
End: 2019-08-19

## 2019-08-20 ENCOUNTER — TELEPHONE (OUTPATIENT)
Dept: PRIMARY CARE CLINIC | Age: 69
End: 2019-08-20

## 2019-08-26 ENCOUNTER — HOSPITAL ENCOUNTER (OUTPATIENT)
Age: 69
Discharge: HOME | End: 2019-08-26
Payer: MEDICARE

## 2019-08-26 DIAGNOSIS — R93.1 ELEVATED CORONARY ARTERY CALCIUM SCORE: Primary | ICD-10-CM

## 2019-08-26 DIAGNOSIS — E11.65 TYPE 2 DIABETES MELLITUS WITH HYPERGLYCEMIA, WITHOUT LONG-TERM CURRENT USE OF INSULIN (HCC): ICD-10-CM

## 2019-08-26 DIAGNOSIS — D68.9 COAGULOPATHY (HCC): ICD-10-CM

## 2019-08-26 DIAGNOSIS — R60.9 EDEMA, UNSPECIFIED TYPE: ICD-10-CM

## 2019-08-26 DIAGNOSIS — E83.52 SERUM CALCIUM ELEVATED: ICD-10-CM

## 2019-08-26 DIAGNOSIS — F41.9 ANXIETY: Primary | ICD-10-CM

## 2019-08-26 LAB
ALBUMIN SERPL-MCNC: 4.7 G/DL (ref 3.5–5.2)
ALBUMIN/GLOBULIN RATIO: ABNORMAL (ref 1–2.5)
ALP BLD-CCNC: 45 U/L (ref 35–104)
ALT SERPL-CCNC: 26 U/L (ref 5–33)
ANION GAP SERPL CALCULATED.3IONS-SCNC: 18 MMOL/L (ref 9–17)
AST SERPL-CCNC: 19 U/L
BILIRUB SERPL-MCNC: 0.24 MG/DL (ref 0.3–1.2)
BUN BLDV-MCNC: 35 MG/DL (ref 8–23)
BUN/CREAT BLD: ABNORMAL (ref 9–20)
CALCIUM SERPL-MCNC: 10.5 MG/DL (ref 8.6–10.4)
CHLORIDE BLD-SCNC: 99 MMOL/L (ref 98–107)
CO2: 22 MMOL/L (ref 20–31)
CREAT SERPL-MCNC: 1.45 MG/DL (ref 0.5–0.9)
CREATININE URINE: 112.3 MG/DL (ref 28–217)
GFR AFRICAN AMERICAN: 43 ML/MIN
GFR NON-AFRICAN AMERICAN: 36 ML/MIN
GFR SERPL CREATININE-BSD FRML MDRD: ABNORMAL ML/MIN/{1.73_M2}
GFR SERPL CREATININE-BSD FRML MDRD: ABNORMAL ML/MIN/{1.73_M2}
GLUCOSE BLD-MCNC: 137 MG/DL (ref 70–99)
INR BLD: 0.9
MICROALBUMIN/CREAT 24H UR: 15 MG/L
MICROALBUMIN/CREAT UR-RTO: 13 MCG/MG CREAT
POTASSIUM SERPL-SCNC: 4.3 MMOL/L (ref 3.7–5.3)
PROTHROMBIN TIME: 12.2 SEC (ref 11.8–14.6)
SODIUM BLD-SCNC: 139 MMOL/L (ref 135–144)
TOTAL PROTEIN: 7.6 G/DL (ref 6.4–8.3)

## 2019-08-26 PROCEDURE — 80053 COMPREHEN METABOLIC PANEL: CPT

## 2019-08-26 PROCEDURE — 36415 COLL VENOUS BLD VENIPUNCTURE: CPT

## 2019-08-26 PROCEDURE — 85610 PROTHROMBIN TIME: CPT

## 2019-08-26 PROCEDURE — 82570 ASSAY OF URINE CREATININE: CPT

## 2019-08-26 PROCEDURE — 82043 UR ALBUMIN QUANTITATIVE: CPT

## 2019-08-26 RX ORDER — LORAZEPAM 0.5 MG/1
TABLET ORAL
Qty: 60 TABLET | Refills: 2 | Status: SHIPPED | OUTPATIENT
Start: 2019-08-26 | End: 2019-09-23 | Stop reason: ALTCHOICE

## 2019-08-28 ENCOUNTER — TELEPHONE (OUTPATIENT)
Dept: PRIMARY CARE CLINIC | Age: 69
End: 2019-08-28

## 2019-08-28 DIAGNOSIS — I82.432 DEEP VEIN THROMBOSIS (DVT) OF POPLITEAL VEIN OF LEFT LOWER EXTREMITY, UNSPECIFIED CHRONICITY (HCC): Primary | ICD-10-CM

## 2019-08-28 DIAGNOSIS — D68.9 COAGULOPATHY (HCC): Primary | ICD-10-CM

## 2019-08-28 DIAGNOSIS — I82.432 DEEP VEIN THROMBOSIS (DVT) OF POPLITEAL VEIN OF LEFT LOWER EXTREMITY, UNSPECIFIED CHRONICITY (HCC): ICD-10-CM

## 2019-08-28 NOTE — TELEPHONE ENCOUNTER
Willing to discuss coumadin use with vascular  Kidneys will not tolerate ASA therapy  Will resume coumadin and meet with coumadin clinic until reviewed with vascular  Placed referral to Dr. Magaly Rosario

## 2019-08-29 ENCOUNTER — TELEPHONE (OUTPATIENT)
Dept: PHARMACY | Age: 69
End: 2019-08-29

## 2019-08-30 ENCOUNTER — HOSPITAL ENCOUNTER (OUTPATIENT)
Dept: VASCULAR LAB | Age: 69
Discharge: HOME | End: 2019-08-30
Payer: MEDICARE

## 2019-08-30 DIAGNOSIS — I82.432 DEEP VEIN THROMBOSIS (DVT) OF POPLITEAL VEIN OF LEFT LOWER EXTREMITY, UNSPECIFIED CHRONICITY (HCC): ICD-10-CM

## 2019-08-30 PROCEDURE — 93970 EXTREMITY STUDY: CPT

## 2019-09-04 DIAGNOSIS — I82.432 DEEP VEIN THROMBOSIS (DVT) OF POPLITEAL VEIN OF LEFT LOWER EXTREMITY, UNSPECIFIED CHRONICITY (HCC): Primary | ICD-10-CM

## 2019-09-09 ENCOUNTER — HOSPITAL ENCOUNTER (OUTPATIENT)
Dept: PHARMACY | Age: 69
Setting detail: THERAPIES SERIES
Discharge: HOME | End: 2019-09-09
Payer: MEDICARE

## 2019-09-09 PROCEDURE — 99211 OFF/OP EST MAY X REQ PHY/QHP: CPT

## 2019-09-23 ENCOUNTER — OFFICE VISIT (OUTPATIENT)
Dept: PRIMARY CARE CLINIC | Age: 69
End: 2019-09-23
Payer: MEDICARE

## 2019-09-23 VITALS
BODY MASS INDEX: 30.83 KG/M2 | HEIGHT: 63 IN | HEART RATE: 99 BPM | DIASTOLIC BLOOD PRESSURE: 82 MMHG | WEIGHT: 174 LBS | SYSTOLIC BLOOD PRESSURE: 118 MMHG | OXYGEN SATURATION: 98 %

## 2019-09-23 DIAGNOSIS — M19.91 PRIMARY OSTEOARTHRITIS, UNSPECIFIED SITE: ICD-10-CM

## 2019-09-23 DIAGNOSIS — R79.89 ELEVATED SERUM CREATININE: ICD-10-CM

## 2019-09-23 DIAGNOSIS — E11.65 TYPE 2 DIABETES MELLITUS WITH HYPERGLYCEMIA, WITHOUT LONG-TERM CURRENT USE OF INSULIN (HCC): ICD-10-CM

## 2019-09-23 DIAGNOSIS — Z00.00 ENCOUNTER FOR GENERAL ADULT MEDICAL EXAMINATION W/O ABNORMAL FINDINGS: Primary | ICD-10-CM

## 2019-09-23 LAB — HBA1C MFR BLD: 6.6 %

## 2019-09-23 PROCEDURE — 4040F PNEUMOC VAC/ADMIN/RCVD: CPT | Performed by: NURSE PRACTITIONER

## 2019-09-23 PROCEDURE — 1123F ACP DISCUSS/DSCN MKR DOCD: CPT | Performed by: NURSE PRACTITIONER

## 2019-09-23 PROCEDURE — 83036 HEMOGLOBIN GLYCOSYLATED A1C: CPT | Performed by: NURSE PRACTITIONER

## 2019-09-23 PROCEDURE — 3044F HG A1C LEVEL LT 7.0%: CPT | Performed by: NURSE PRACTITIONER

## 2019-09-23 PROCEDURE — 3017F COLORECTAL CA SCREEN DOC REV: CPT | Performed by: NURSE PRACTITIONER

## 2019-09-23 PROCEDURE — G0439 PPPS, SUBSEQ VISIT: HCPCS | Performed by: NURSE PRACTITIONER

## 2019-09-23 ASSESSMENT — PATIENT HEALTH QUESTIONNAIRE - PHQ9
SUM OF ALL RESPONSES TO PHQ QUESTIONS 1-9: 0
SUM OF ALL RESPONSES TO PHQ QUESTIONS 1-9: 0

## 2019-09-23 ASSESSMENT — LIFESTYLE VARIABLES: HOW OFTEN DO YOU HAVE A DRINK CONTAINING ALCOHOL: 0

## 2019-09-23 ASSESSMENT — ANXIETY QUESTIONNAIRES: GAD7 TOTAL SCORE: 2

## 2019-09-23 NOTE — PROGRESS NOTES
(6 mo and older Fluzone, Flulaval, Fluarix and 3 yrs and older Afluria) 09/25/2017        Health Maintenance   Topic Date Due    DTaP/Tdap/Td vaccine (1 - Tdap) 09/28/1968    Shingles Vaccine (1 of 2) 09/28/1999    DEXA (modify frequency per FRAX score)  09/28/2014    Pneumococcal 65+ years Vaccine (1 of 2 - PCV13) 09/28/2014    Diabetic retinal exam  12/11/2018    Annual Wellness Visit (AWV)  05/29/2019    Breast cancer screen  08/15/2019    Flu vaccine (1) 09/01/2019    A1C test (Diabetic or Prediabetic)  11/14/2019    Lipid screen  03/11/2020    Diabetic foot exam  05/23/2020    Diabetic microalbuminuria test  08/26/2020    Potassium monitoring  08/26/2020    Creatinine monitoring  08/26/2020    Colon cancer screen colonoscopy  03/10/2026    Hepatitis C screen  Completed     Recommendations for Preventive Services Due: see orders and patient instructions/AVS.  . Recommended screening schedule for the next 5-10 years is provided to the patient in written form: see Patient Ashley Gan was seen today for medicare awv and discuss medications. Diagnoses and all orders for this visit:    Encounter for general adult medical examination w/o abnormal findings    Type 2 diabetes mellitus with hyperglycemia, without long-term current use of insulin (HCC)  -     POCT glycosylated hemoglobin (Hb A1C)  -     Hemoglobin A1C; Future    Primary osteoarthritis, unspecified site  -     [address]    Elevated serum creatinine  -     Comprehensive Metabolic Panel;  Future          Presents with  Cabrera    Has stopped all medications  Feeling much better without ativan and metformin  States that she now has more energy    Would like referral to dietician, needs to know diet for inflammation  FBS were down to 120's now up to 170s    Has stopped coumadin  Has appt with Dr. Janene Gitelman for review on 10/1/19    Repeat Hga1c/PTH/CMP in one month    Follow up in 3 months

## 2019-09-24 ENCOUNTER — OFFICE VISIT (OUTPATIENT)
Dept: PODIATRY | Age: 69
End: 2019-09-24
Payer: MEDICARE

## 2019-09-24 VITALS — WEIGHT: 170 LBS | HEIGHT: 63 IN | BODY MASS INDEX: 30.12 KG/M2

## 2019-09-24 DIAGNOSIS — D23.72 BENIGN NEOPLASM OF SKIN OF LEFT FOOT: ICD-10-CM

## 2019-09-24 DIAGNOSIS — I73.9 PVD (PERIPHERAL VASCULAR DISEASE) (HCC): ICD-10-CM

## 2019-09-24 DIAGNOSIS — B35.1 DERMATOPHYTOSIS OF NAIL: ICD-10-CM

## 2019-09-24 DIAGNOSIS — E11.51 TYPE II DIABETES MELLITUS WITH PERIPHERAL CIRCULATORY DISORDER (HCC): Primary | ICD-10-CM

## 2019-09-24 DIAGNOSIS — R60.0 EDEMA OF LOWER EXTREMITY: ICD-10-CM

## 2019-09-24 PROCEDURE — 1036F TOBACCO NON-USER: CPT | Performed by: PODIATRIST

## 2019-09-24 PROCEDURE — 11721 DEBRIDE NAIL 6 OR MORE: CPT | Performed by: PODIATRIST

## 2019-09-24 PROCEDURE — 99213 OFFICE O/P EST LOW 20 MIN: CPT | Performed by: PODIATRIST

## 2019-09-24 PROCEDURE — 2022F DILAT RTA XM EVC RTNOPTHY: CPT | Performed by: PODIATRIST

## 2019-09-24 PROCEDURE — 4040F PNEUMOC VAC/ADMIN/RCVD: CPT | Performed by: PODIATRIST

## 2019-09-24 PROCEDURE — G8427 DOCREV CUR MEDS BY ELIG CLIN: HCPCS | Performed by: PODIATRIST

## 2019-09-24 PROCEDURE — G8417 CALC BMI ABV UP PARAM F/U: HCPCS | Performed by: PODIATRIST

## 2019-09-24 PROCEDURE — 1123F ACP DISCUSS/DSCN MKR DOCD: CPT | Performed by: PODIATRIST

## 2019-09-24 PROCEDURE — 3017F COLORECTAL CA SCREEN DOC REV: CPT | Performed by: PODIATRIST

## 2019-09-24 PROCEDURE — 1090F PRES/ABSN URINE INCON ASSESS: CPT | Performed by: PODIATRIST

## 2019-09-24 PROCEDURE — 17110 DESTRUCTION B9 LES UP TO 14: CPT | Performed by: PODIATRIST

## 2019-09-24 PROCEDURE — G8400 PT W/DXA NO RESULTS DOC: HCPCS | Performed by: PODIATRIST

## 2019-09-24 PROCEDURE — 3044F HG A1C LEVEL LT 7.0%: CPT | Performed by: PODIATRIST

## 2019-10-07 ENCOUNTER — INITIAL CONSULT (OUTPATIENT)
Dept: VASCULAR SURGERY | Age: 69
End: 2019-10-07
Payer: MEDICARE

## 2019-10-07 VITALS
DIASTOLIC BLOOD PRESSURE: 81 MMHG | HEART RATE: 97 BPM | OXYGEN SATURATION: 98 % | SYSTOLIC BLOOD PRESSURE: 128 MMHG | BODY MASS INDEX: 30.9 KG/M2 | HEIGHT: 63 IN | WEIGHT: 174.4 LBS

## 2019-10-07 DIAGNOSIS — Z86.711 HISTORY OF PULMONARY EMBOLUS (PE): Primary | ICD-10-CM

## 2019-10-07 PROCEDURE — G8427 DOCREV CUR MEDS BY ELIG CLIN: HCPCS | Performed by: SURGERY

## 2019-10-07 PROCEDURE — 99203 OFFICE O/P NEW LOW 30 MIN: CPT | Performed by: SURGERY

## 2019-10-07 PROCEDURE — 1090F PRES/ABSN URINE INCON ASSESS: CPT | Performed by: SURGERY

## 2019-10-07 PROCEDURE — G8484 FLU IMMUNIZE NO ADMIN: HCPCS | Performed by: SURGERY

## 2019-10-07 PROCEDURE — G8417 CALC BMI ABV UP PARAM F/U: HCPCS | Performed by: SURGERY

## 2019-10-07 RX ORDER — LORAZEPAM 0.5 MG/1
TABLET ORAL
Refills: 2 | COMMUNITY
Start: 2019-09-30 | End: 2019-12-02 | Stop reason: ALTCHOICE

## 2019-10-08 ENCOUNTER — TELEPHONE (OUTPATIENT)
Dept: PHARMACY | Age: 69
End: 2019-10-08

## 2019-10-08 ASSESSMENT — ENCOUNTER SYMPTOMS
ABDOMINAL PAIN: 0
ALLERGIC/IMMUNOLOGIC NEGATIVE: 1
CHEST TIGHTNESS: 0
COLOR CHANGE: 1
SHORTNESS OF BREATH: 1

## 2019-10-18 ENCOUNTER — TELEPHONE (OUTPATIENT)
Dept: PRIMARY CARE CLINIC | Age: 69
End: 2019-10-18

## 2019-11-04 DIAGNOSIS — E11.65 TYPE 2 DIABETES MELLITUS WITH HYPERGLYCEMIA, WITHOUT LONG-TERM CURRENT USE OF INSULIN (HCC): ICD-10-CM

## 2019-11-04 RX ORDER — LISINOPRIL AND HYDROCHLOROTHIAZIDE 25; 20 MG/1; MG/1
TABLET ORAL
Qty: 90 TABLET | Refills: 3 | Status: SHIPPED | OUTPATIENT
Start: 2019-11-04 | End: 2020-08-24 | Stop reason: ALTCHOICE

## 2019-11-07 ENCOUNTER — OFFICE VISIT (OUTPATIENT)
Dept: PODIATRY | Age: 69
End: 2019-11-07
Payer: MEDICARE

## 2019-11-07 VITALS — BODY MASS INDEX: 30.12 KG/M2 | WEIGHT: 170 LBS | HEIGHT: 63 IN

## 2019-11-07 DIAGNOSIS — E11.51 TYPE II DIABETES MELLITUS WITH PERIPHERAL CIRCULATORY DISORDER (HCC): Primary | ICD-10-CM

## 2019-11-07 DIAGNOSIS — D23.72 BENIGN NEOPLASM OF SKIN OF LEFT FOOT: ICD-10-CM

## 2019-11-07 DIAGNOSIS — B35.1 DERMATOPHYTOSIS OF NAIL: ICD-10-CM

## 2019-11-07 DIAGNOSIS — I73.9 PVD (PERIPHERAL VASCULAR DISEASE) (HCC): ICD-10-CM

## 2019-11-07 DIAGNOSIS — R60.0 EDEMA OF LOWER EXTREMITY: ICD-10-CM

## 2019-11-07 PROCEDURE — G8427 DOCREV CUR MEDS BY ELIG CLIN: HCPCS | Performed by: PODIATRIST

## 2019-11-07 PROCEDURE — 99213 OFFICE O/P EST LOW 20 MIN: CPT | Performed by: PODIATRIST

## 2019-11-07 PROCEDURE — 4040F PNEUMOC VAC/ADMIN/RCVD: CPT | Performed by: PODIATRIST

## 2019-11-07 PROCEDURE — G8417 CALC BMI ABV UP PARAM F/U: HCPCS | Performed by: PODIATRIST

## 2019-11-07 PROCEDURE — G8400 PT W/DXA NO RESULTS DOC: HCPCS | Performed by: PODIATRIST

## 2019-11-07 PROCEDURE — 17110 DESTRUCTION B9 LES UP TO 14: CPT | Performed by: PODIATRIST

## 2019-11-07 PROCEDURE — 3017F COLORECTAL CA SCREEN DOC REV: CPT | Performed by: PODIATRIST

## 2019-11-07 PROCEDURE — 3044F HG A1C LEVEL LT 7.0%: CPT | Performed by: PODIATRIST

## 2019-11-07 PROCEDURE — 2022F DILAT RTA XM EVC RTNOPTHY: CPT | Performed by: PODIATRIST

## 2019-11-07 PROCEDURE — 1036F TOBACCO NON-USER: CPT | Performed by: PODIATRIST

## 2019-11-07 PROCEDURE — 1090F PRES/ABSN URINE INCON ASSESS: CPT | Performed by: PODIATRIST

## 2019-11-07 PROCEDURE — 1123F ACP DISCUSS/DSCN MKR DOCD: CPT | Performed by: PODIATRIST

## 2019-11-07 PROCEDURE — G8484 FLU IMMUNIZE NO ADMIN: HCPCS | Performed by: PODIATRIST

## 2019-11-11 ENCOUNTER — OFFICE VISIT (OUTPATIENT)
Dept: FAMILY MEDICINE CLINIC | Age: 69
End: 2019-11-11
Payer: MEDICARE

## 2019-11-11 DIAGNOSIS — Z23 NEED FOR INFLUENZA VACCINATION: Primary | ICD-10-CM

## 2019-11-11 PROCEDURE — G0008 ADMIN INFLUENZA VIRUS VAC: HCPCS | Performed by: NURSE PRACTITIONER

## 2019-11-11 PROCEDURE — 90653 IIV ADJUVANT VACCINE IM: CPT | Performed by: NURSE PRACTITIONER

## 2019-11-11 PROCEDURE — G8417 CALC BMI ABV UP PARAM F/U: HCPCS | Performed by: NURSE PRACTITIONER

## 2019-11-11 PROCEDURE — G8428 CUR MEDS NOT DOCUMENT: HCPCS | Performed by: NURSE PRACTITIONER

## 2019-11-25 ENCOUNTER — HOSPITAL ENCOUNTER (OUTPATIENT)
Age: 69
Discharge: HOME | End: 2019-11-25
Payer: MEDICARE

## 2019-11-25 DIAGNOSIS — E11.65 TYPE 2 DIABETES MELLITUS WITH HYPERGLYCEMIA, WITHOUT LONG-TERM CURRENT USE OF INSULIN (HCC): ICD-10-CM

## 2019-11-25 DIAGNOSIS — E83.52 SERUM CALCIUM ELEVATED: ICD-10-CM

## 2019-11-25 DIAGNOSIS — R79.89 ELEVATED SERUM CREATININE: ICD-10-CM

## 2019-11-25 LAB
ALBUMIN SERPL-MCNC: 4.2 G/DL (ref 3.5–5.2)
ALBUMIN/GLOBULIN RATIO: ABNORMAL (ref 1–2.5)
ALP BLD-CCNC: 62 U/L (ref 35–104)
ALT SERPL-CCNC: 28 U/L (ref 5–33)
ANION GAP SERPL CALCULATED.3IONS-SCNC: 14 MMOL/L (ref 9–17)
AST SERPL-CCNC: 19 U/L
BILIRUB SERPL-MCNC: 0.23 MG/DL (ref 0.3–1.2)
BUN BLDV-MCNC: 18 MG/DL (ref 8–23)
BUN/CREAT BLD: ABNORMAL (ref 9–20)
CALCIUM SERPL-MCNC: 10.3 MG/DL (ref 8.6–10.4)
CHLORIDE BLD-SCNC: 101 MMOL/L (ref 98–107)
CO2: 23 MMOL/L (ref 20–31)
CREAT SERPL-MCNC: 1.05 MG/DL (ref 0.5–0.9)
ESTIMATED AVERAGE GLUCOSE: 186 MG/DL
GFR AFRICAN AMERICAN: >60 ML/MIN
GFR NON-AFRICAN AMERICAN: 52 ML/MIN
GFR SERPL CREATININE-BSD FRML MDRD: ABNORMAL ML/MIN/{1.73_M2}
GFR SERPL CREATININE-BSD FRML MDRD: ABNORMAL ML/MIN/{1.73_M2}
GLUCOSE BLD-MCNC: 189 MG/DL (ref 70–99)
HBA1C MFR BLD: 8.1 % (ref 4–6)
POTASSIUM SERPL-SCNC: 4.5 MMOL/L (ref 3.7–5.3)
SODIUM BLD-SCNC: 138 MMOL/L (ref 135–144)
TOTAL PROTEIN: 7.3 G/DL (ref 6.4–8.3)

## 2019-11-25 PROCEDURE — 80053 COMPREHEN METABOLIC PANEL: CPT

## 2019-11-25 PROCEDURE — 83036 HEMOGLOBIN GLYCOSYLATED A1C: CPT

## 2019-11-25 PROCEDURE — 83970 ASSAY OF PARATHORMONE: CPT

## 2019-11-25 PROCEDURE — 36415 COLL VENOUS BLD VENIPUNCTURE: CPT

## 2019-11-25 PROCEDURE — 82330 ASSAY OF CALCIUM: CPT

## 2019-11-26 LAB
CALCIUM IONIZED: 1.16 MMOL/L (ref 1.13–1.33)
PTH INTACT: 56.27 PG/ML (ref 15–65)

## 2019-12-02 ENCOUNTER — OFFICE VISIT (OUTPATIENT)
Dept: PRIMARY CARE CLINIC | Age: 69
End: 2019-12-02
Payer: MEDICARE

## 2019-12-02 VITALS
OXYGEN SATURATION: 96 % | DIASTOLIC BLOOD PRESSURE: 84 MMHG | BODY MASS INDEX: 30.87 KG/M2 | RESPIRATION RATE: 16 BRPM | HEIGHT: 63 IN | HEART RATE: 104 BPM | SYSTOLIC BLOOD PRESSURE: 122 MMHG | WEIGHT: 174.2 LBS

## 2019-12-02 DIAGNOSIS — E11.65 TYPE 2 DIABETES MELLITUS WITH HYPERGLYCEMIA, WITHOUT LONG-TERM CURRENT USE OF INSULIN (HCC): Primary | ICD-10-CM

## 2019-12-02 PROCEDURE — 1036F TOBACCO NON-USER: CPT | Performed by: NURSE PRACTITIONER

## 2019-12-02 PROCEDURE — 2022F DILAT RTA XM EVC RTNOPTHY: CPT | Performed by: NURSE PRACTITIONER

## 2019-12-02 PROCEDURE — 99214 OFFICE O/P EST MOD 30 MIN: CPT | Performed by: NURSE PRACTITIONER

## 2019-12-02 PROCEDURE — 3017F COLORECTAL CA SCREEN DOC REV: CPT | Performed by: NURSE PRACTITIONER

## 2019-12-02 PROCEDURE — G8482 FLU IMMUNIZE ORDER/ADMIN: HCPCS | Performed by: NURSE PRACTITIONER

## 2019-12-02 PROCEDURE — 4040F PNEUMOC VAC/ADMIN/RCVD: CPT | Performed by: NURSE PRACTITIONER

## 2019-12-02 PROCEDURE — G8400 PT W/DXA NO RESULTS DOC: HCPCS | Performed by: NURSE PRACTITIONER

## 2019-12-02 PROCEDURE — 3045F PR MOST RECENT HEMOGLOBIN A1C LEVEL 7.0-9.0%: CPT | Performed by: NURSE PRACTITIONER

## 2019-12-02 PROCEDURE — 1090F PRES/ABSN URINE INCON ASSESS: CPT | Performed by: NURSE PRACTITIONER

## 2019-12-02 PROCEDURE — G8417 CALC BMI ABV UP PARAM F/U: HCPCS | Performed by: NURSE PRACTITIONER

## 2019-12-02 PROCEDURE — 1123F ACP DISCUSS/DSCN MKR DOCD: CPT | Performed by: NURSE PRACTITIONER

## 2019-12-02 PROCEDURE — G8427 DOCREV CUR MEDS BY ELIG CLIN: HCPCS | Performed by: NURSE PRACTITIONER

## 2019-12-02 RX ORDER — ASPIRIN 81 MG/1
81 TABLET, CHEWABLE ORAL DAILY
COMMUNITY

## 2019-12-02 ASSESSMENT — ENCOUNTER SYMPTOMS
BACK PAIN: 0
COUGH: 0
SHORTNESS OF BREATH: 0
ABDOMINAL PAIN: 0

## 2019-12-09 ENCOUNTER — TELEPHONE (OUTPATIENT)
Dept: PRIMARY CARE CLINIC | Age: 69
End: 2019-12-09

## 2020-01-14 ENCOUNTER — OFFICE VISIT (OUTPATIENT)
Dept: PODIATRY | Age: 70
End: 2020-01-14
Payer: MEDICARE

## 2020-01-14 VITALS — BODY MASS INDEX: 30.83 KG/M2 | HEIGHT: 63 IN | WEIGHT: 174 LBS

## 2020-01-14 PROCEDURE — 1036F TOBACCO NON-USER: CPT | Performed by: PODIATRIST

## 2020-01-14 PROCEDURE — 1123F ACP DISCUSS/DSCN MKR DOCD: CPT | Performed by: PODIATRIST

## 2020-01-14 PROCEDURE — 3017F COLORECTAL CA SCREEN DOC REV: CPT | Performed by: PODIATRIST

## 2020-01-14 PROCEDURE — 2022F DILAT RTA XM EVC RTNOPTHY: CPT | Performed by: PODIATRIST

## 2020-01-14 PROCEDURE — G8482 FLU IMMUNIZE ORDER/ADMIN: HCPCS | Performed by: PODIATRIST

## 2020-01-14 PROCEDURE — 4040F PNEUMOC VAC/ADMIN/RCVD: CPT | Performed by: PODIATRIST

## 2020-01-14 PROCEDURE — 3046F HEMOGLOBIN A1C LEVEL >9.0%: CPT | Performed by: PODIATRIST

## 2020-01-14 PROCEDURE — 17110 DESTRUCTION B9 LES UP TO 14: CPT | Performed by: PODIATRIST

## 2020-01-14 PROCEDURE — G8417 CALC BMI ABV UP PARAM F/U: HCPCS | Performed by: PODIATRIST

## 2020-01-14 PROCEDURE — 1090F PRES/ABSN URINE INCON ASSESS: CPT | Performed by: PODIATRIST

## 2020-01-14 PROCEDURE — 11721 DEBRIDE NAIL 6 OR MORE: CPT | Performed by: PODIATRIST

## 2020-01-14 PROCEDURE — G8400 PT W/DXA NO RESULTS DOC: HCPCS | Performed by: PODIATRIST

## 2020-01-14 PROCEDURE — G8427 DOCREV CUR MEDS BY ELIG CLIN: HCPCS | Performed by: PODIATRIST

## 2020-01-14 RX ORDER — ATORVASTATIN CALCIUM 20 MG/1
TABLET, FILM COATED ORAL
COMMUNITY
Start: 2019-11-05 | End: 2020-02-03 | Stop reason: SDUPTHER

## 2020-01-14 RX ORDER — LORAZEPAM 0.5 MG/1
TABLET ORAL
COMMUNITY
Start: 2019-11-05 | End: 2020-08-24 | Stop reason: SDUPTHER

## 2020-01-14 NOTE — PROGRESS NOTES
2017    on metformin    Vocal cord paralysis 2014    [facility]ocal cord surgery in [facility]    Wears glasses        No Known Allergies  Current Outpatient Medications on File Prior to Visit   Medication Sig Dispense Refill    LORazepam (ATIVAN) 0.5 MG tablet       atorvastatin (LIPITOR) 20 MG tablet       aspirin 81 MG chewable tablet Take 81 mg by mouth daily      Semaglutide,0.25 or 0.5MG/DOS, (OZEMPIC, 0.25 OR 0.5 MG/DOSE,) 2 MG/1.5ML SOPN Inject 0.25 mg into the skin once a week 1 pen 0    lisinopril-hydrochlorothiazide (PRINZIDE;ZESTORETIC) 20-25 MG per tablet TAKE 1 TABLET BY MOUTH ONCE DAILY 90 tablet 3    blood glucose test strips (ACCU-CHEK MICHELE PLUS) strip USE  STRIP TO CHECK GLUCOSE TWICE DAILY. Dx: E11.65 Type 2 DM with Hyperglycemia 100 strip 1    ACCU-CHEK SOFTCLIX LANCETS MISC USE TO CHECK GLUCOSE TWICE DAILY. Dx: E11.65 Type 2 DM with hyperglycemia 100 each 1    acetaminophen (TYLENOL ARTHRITIS PAIN) 650 MG extended release tablet Take 1,300 mg by mouth 3 times daily       Alcohol Swabs PADS Need to use  twice a day 100 each 3    Blood Glucose Monitoring Suppl FLAVIA Glucometer Accu check. Test Blood Sugars 2 Times Daily. Dx: E 11.22, N 18.3 type II DM 1 Device 0     No current facility-administered medications on file prior to visit. Review of Systems    Review of Systems:   History obtained from chart review and the patient  General ROS: negative for - chills, fatigue, fever, night sweats or weight gain  Constitutional: Negative for chills, diaphoresis, fatigue, fever and unexpected weight change. Musculoskeletal: Positive for arthralgias, gait problem and joint swelling. Neurological ROS: negative for - behavioral changes, confusion, headaches or seizures. Negative for weakness and numbness. Dermatological ROS: negative for - mole changes, rash  Cardiovascular: Negative for leg swelling. Gastrointestinal: Negative for constipation, diarrhea, nausea and vomiting. Objective:  General: AAO x 3 in NAD. Derm  Toenail Description  Sites of Onychomycosis Involvement (Check affected area)  [x] [x] [x] [x] [x] [x] [x] [x] [x] [x]  5 4 3 2 1 1 2 3 4 5                          Right                                        Left    Thickness  [x] [x] [x] [x] [x] [x] [x] [x] [x] [x]  5 4 3 2 1 1 2 3 4 5                         Right                                        Left    Dystrophic Changes   [x] [x] [x] [x] [x] [x] [x] [x] [x] [x]  5 4 3 2 1 1 2 3 4 5                         Right                                        Left    Color   [x] [x] [x] [x] [x] [x] [x] [x] [x] [x]  5 4 3 2 1 1 2 3 4 5                          Right                                        Left    Incurvation/Ingrowin   [] [] [] [] [] [] [] [] [] []  5 4 3 2 1 1 2 3 4 5                         Right                                        Left    Inflammation/Pain   [x] [x] [x] [x] [x] [x] [x] [x] [x] [x]  5 4 3 2 1 1 2 3 4 5                         Right                                        Left      Dermatologic Exam:  Soft tissue lesion to the plantar left foot with central core x1 and petechiae.  Pain on palpation of lesion  Skin is thin, with flaky sloughing skin as well as decreased hair growth to the lower leg   Small red hemosiderin deposits seen dorsal foot    Musculoskeletal:     1st MPJ ROM decreased, Bilateral.  Muscle strength 5/5, Bilateral.  Pain present upon palpation of toenails 1-5, Bilateral. decreased medial longitudinal arch, Bilateral.  Ankle ROM decreased,Bilateral.    Dorsally contracted digits present digits 2, Bilateral.     Vascular: DP and PT pulses palpable 1/4, Bilateral.  CFT <5 seconds, Bilateral.  Hair growth absent to the level of the digits, Bilateral.  Edema present, Bilateral.  Varicosities absent, Bilateral. Erythema absent, Bilateral    Neurological: Sensation diminshed to light touch to level of digits, Bilateral.  Protective sensation intact 6/10 sites via

## 2020-02-03 ENCOUNTER — NURSE ONLY (OUTPATIENT)
Dept: PRIMARY CARE CLINIC | Age: 70
End: 2020-02-03
Payer: MEDICARE

## 2020-02-03 LAB — HBA1C MFR BLD: 7.3 %

## 2020-02-03 PROCEDURE — 83036 HEMOGLOBIN GLYCOSYLATED A1C: CPT | Performed by: NURSE PRACTITIONER

## 2020-02-03 RX ORDER — ATORVASTATIN CALCIUM 20 MG/1
20 TABLET, FILM COATED ORAL DAILY
Qty: 30 TABLET | Refills: 5 | Status: SHIPPED | OUTPATIENT
Start: 2020-02-03 | End: 2020-08-10

## 2020-02-03 RX ORDER — LISINOPRIL 20 MG/1
20 TABLET ORAL DAILY
Qty: 90 TABLET | Refills: 3 | Status: SHIPPED | OUTPATIENT
Start: 2020-02-03 | End: 2020-08-24 | Stop reason: ALTCHOICE

## 2020-02-04 ENCOUNTER — TELEPHONE (OUTPATIENT)
Dept: PRIMARY CARE CLINIC | Age: 70
End: 2020-02-04

## 2020-02-04 NOTE — TELEPHONE ENCOUNTER
Pt  called for results of A1C check. Advised him that it was 7.3. Pt  would like to know if pt should continue on Ozempic. Please advise.

## 2020-02-04 NOTE — TELEPHONE ENCOUNTER
Would recommend her to do so, otherwise it will be elevated again    May  sample pen if needed    thanks

## 2020-02-24 RX ORDER — FUROSEMIDE 40 MG/1
40 TABLET ORAL DAILY
Qty: 30 TABLET | Refills: 0 | Status: SHIPPED | OUTPATIENT
Start: 2020-02-24 | End: 2020-03-31 | Stop reason: SDUPTHER

## 2020-02-25 ENCOUNTER — OFFICE VISIT (OUTPATIENT)
Dept: PODIATRY | Age: 70
End: 2020-02-25
Payer: MEDICARE

## 2020-02-25 VITALS — WEIGHT: 174 LBS | BODY MASS INDEX: 30.83 KG/M2 | HEIGHT: 63 IN

## 2020-02-25 PROCEDURE — 11721 DEBRIDE NAIL 6 OR MORE: CPT | Performed by: PODIATRIST

## 2020-02-25 PROCEDURE — 17110 DESTRUCTION B9 LES UP TO 14: CPT | Performed by: PODIATRIST

## 2020-02-25 NOTE — PROGRESS NOTES
([facility] 2014    on coumadin    RLS (restless legs syndrome)     Type 2 diabetes mellitus without complication ([facility] 5899    on metformin    Vocal cord paralysis 2014    vocal cord surgery in [facility]    Wears glasses        No Known Allergies  Current Outpatient Medications on File Prior to Visit   Medication Sig Dispense Refill    furosemide (LASIX) 40 MG tablet Take 1 tablet by mouth daily 30 tablet 0    Handicap Placard MISC by Other route Expires 2/2025 1 each 0    atorvastatin (LIPITOR) 20 MG tablet Take 1 tablet by mouth daily 30 tablet 5    lisinopril (PRINIVIL;ZESTRIL) 20 MG tablet Take 1 tablet by mouth daily 90 tablet 3    LORazepam (ATIVAN) 0.5 MG tablet       aspirin 81 MG chewable tablet Take 81 mg by mouth daily      Semaglutide,0.25 or 0.5MG/DOS, (OZEMPIC, 0.25 OR 0.5 MG/DOSE,) 2 MG/1.5ML SOPN Inject 0.25 mg into the skin once a week 1 pen 0    lisinopril-hydrochlorothiazide (PRINZIDE;ZESTORETIC) 20-25 MG per tablet TAKE 1 TABLET BY MOUTH ONCE DAILY 90 tablet 3    blood glucose test strips (ACCU-CHEK MICHELE PLUS) strip USE  STRIP TO CHECK GLUCOSE TWICE DAILY. Dx: E11.65 Type 2 DM with Hyperglycemia 100 strip 1    ACCU-CHEK SOFTCLIX LANCETS [facility] USE TO CHECK GLUCOSE TWICE DAILY. Dx: E11.65 Type 2 DM with hyperglycemia 100 each 1    acetaminophen (TYLENOL ARTHRITIS PAIN) 650 MG extended release tablet Take 1,300 mg by mouth 3 times daily       Alcohol Swabs PADS Need to use  twice a day 100 each 3    Blood Glucose Monitoring Suppl FLAVIA Glucometer Accu check. Test Blood Sugars 2 Times Daily. Dx: E 11.22, N 18.3 type II DM 1 Device 0     No current facility-administered medications on file prior to visit.         Review of Systems      Review of Systems:   History obtained from chart review and the patient  General ROS: negative for - chills, fatigue, fever, night sweats or weight gain  Constitutional: Negativefor chills, diaphoresis, fatigue, fever and unexpected weight change. Musculoskeletal: Positive for arthralgias, gait problem and joint swelling. Neurological ROS: negative for - behavioral changes, confusion, headaches or seizures. Negative for weakness and numbness. Dermatological ROS: negative for - mole changes,rash  Cardiovascular: Negative for leg swelling. Gastrointestinal: Negative for constipation, diarrhea, nausea and vomiting. Objective:  Dermatologic Exam:  Soft tissue lesion to the left 4th interspace with central core and petechiae. Pain on palpation of lesion. Skin No rashes or nodules noted. .   Skin is thin, with flaky sloughing skin as well as decreased hair growth to the lower leg  Small red hemosiderin deposits seen dorsal foot     Musculoskeletal:     1st MPJ ROM decreased, Bilateral.  Muscle strength 5/5, Bilateral.  Pain present upon palpation of toenails 1-5, Bilateral. decreased medial longitudinal arch, Bilateral.  Ankle ROM decreased,Bilateral.    Dorsally contracted digits present digits 2, Bilateral.     Vascular: DP pulses 1/4 bilateral.  PT pulses 0/4 bilateral.   CFT <5 seconds, Bilateral.  Hair growth absent to the level of the digits, Bilateral.  Edema present, Bilateral.  Varicosities absent, Bilateral. Erythema absent, Bilateral    Neurological: Sensation diminshed to light touch to level of digits, Bilateral.  Protective sensation intact 6/10 sites via 5.07/10g [place]h Monofilament, Bilateral.  negative Tinel's, Bilateral.  negative Valleix sign, Bilateral.      Integument: Warm, dry, supple, Bilateral.  Open lesion absent, Bilateral.  Interdigital maceration absent to web spaces 4, Bilateral.  Nails 1-5 left and 1-5 right thickened > 3.0 mm, dystrophic and crumbly, discolored with subungual debris. Fissures absent, Bilateral.   General: AAO x 3 in NAD.     Derm  Toenail Description  Sites of Onychomycosis Involvement (Check affected area)  [x] [x] [x] [x] [x] [x] [x] [x] [x] [x]  5 4 3 2 1 1 2 3 4 5 Right                                        Left    Thickness  [x] [x] [x] [x] [x] [x] [x] [x] [x] [x]  5 4 3 2 1 1 2 3 4 5                         Right                                        Left    Dystrophic Changes   [x] [x] [x] [x] [x] [x] [x] [x] [x] [x]  5 4 3 2 1 1 2 3 4 5                         Right                                        Left    Color   [x] [x] [x] [x] [x] [x] [x] [x] [x] [x]  5 4 3 2 1 1 2 3 4 5                          Right                                        Left    Incurvation/Ingrowin   [] [] [] [] [] [] [] [] [] []  5 4 3 2 1 1 2 3 4 5                         Right                                        Left    Inflammation/Pain   [x] [x] [x] [x] [x] [x] [x] [x] [x] [x]  5 4 3 2 1 1 2 3 4 5                         Right                                        Left      Visual inspection:  Deformity: hammertoe deformity yudy feet  amputation: absent  Skin lesions: as above  Edema: right- 2+ pittingedema, left- 2+ pitting edema  Sensory exam:  Monofilament sensation: abnormal - 6/10 via SW 5.07/10g monofilament to the plantar foot bilateral feet  Pulses: abnormal - 1/4 dorsalis pedis pulse and 1/4Posterior tibial pulse,   Pinprick: Impaired  Proprioception: Impaired  Vibration (128 Hz): Impaired       Assessment:  78 y.o. female with:     Diagnosis Orders   1. Type II diabetes mellitus with peripheral circulatory disorder (HCC)  31637 - MD DEBRIDEMENT OF NAILS, 6 OR MORE    HM DIABETES FOOT EXAM    77968 - MD DESTRUCTION BENIGN LESIONS UP TO 14   2. PVD [facility]peripheral vascular disease) [facility]  29939 - MD DEBRIDEMENT OF NAILS, 6 OR MORE    HM DIABETES FOOT EXAM    97122 - MD DESTRUCTION BENIGN LESIONS UP TO 14   3. Dermatophytosis of nail  27117 - MD DEBRIDEMENT OF NAILS, 6 OR MORE    HM DIABETES FOOT EXAM   4.  Benign neoplasm of skin of left foot  HM DIABETES FOOT EXAM    11164 - MD DESTRUCTION BENIGN LESIONS UP TO 14         Q7   []Yes  []No                Q8   [x]Yes  []No

## 2020-03-16 ENCOUNTER — TELEPHONE (OUTPATIENT)
Dept: PRIMARY CARE CLINIC | Age: 70
End: 2020-03-16

## 2020-03-16 NOTE — TELEPHONE ENCOUNTER
Patient  called and said patient has finished her 6 week trial of the Ozempic. He was wondering if patient should come in for a A1C check or if more ozempic can be ordered.

## 2020-03-31 RX ORDER — FUROSEMIDE 40 MG/1
40 TABLET ORAL DAILY
Qty: 30 TABLET | Refills: 0 | Status: SHIPPED | OUTPATIENT
Start: 2020-03-31 | End: 2020-06-03

## 2020-03-31 NOTE — TELEPHONE ENCOUNTER
[facility]   6/1/2020  9:00 AM RANDY Sue CNP [facility]            Patient Active Problem List:     Mixed hyperlipidemia     Essential (primary) hypertension     Anxiety     Gastroesophageal reflux disease without esophagitis     Restless leg syndrome     Acute respiratory failure (HCC)     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     Coagulopathy (HCC)     Benign essential HTN     Failure to wean from mechanical ventilation (HCC)     History of repair of hip joint     Osteoarthritis     Respiratory failure (HCC)     Decreased muscle tone     Scar condition and fibrosis of skin     Lumbar canal stenosis     Type 2 diabetes mellitus with hyperglycemia, without long-term current use of insulin (HCC)     Pneumonia     Sepsis (HCC)     Constipation     BLANE (acute kidney injury) ([address]     Hx of pulmonary embolus     Moderate malnutrition ([address]     Pneumonia due to organism     Supratherapeutic INR     Hiatal hernia

## 2020-05-04 RX ORDER — SEMAGLUTIDE 1.34 MG/ML
0.25 INJECTION, SOLUTION SUBCUTANEOUS WEEKLY
Qty: 1 PEN | Refills: 0 | Status: SHIPPED | OUTPATIENT
Start: 2020-05-04 | End: 2020-08-24 | Stop reason: SDUPTHER

## 2020-05-28 ENCOUNTER — OFFICE VISIT (OUTPATIENT)
Dept: PODIATRY | Age: 70
End: 2020-05-28
Payer: MEDICARE

## 2020-05-28 VITALS — HEIGHT: 63 IN | WEIGHT: 174 LBS | TEMPERATURE: 97.3 F | BODY MASS INDEX: 30.83 KG/M2

## 2020-05-28 PROCEDURE — 17110 DESTRUCTION B9 LES UP TO 14: CPT | Performed by: PODIATRIST

## 2020-05-28 PROCEDURE — 3051F HG A1C>EQUAL 7.0%<8.0%: CPT | Performed by: PODIATRIST

## 2020-05-28 PROCEDURE — 4040F PNEUMOC VAC/ADMIN/RCVD: CPT | Performed by: PODIATRIST

## 2020-05-28 PROCEDURE — 1123F ACP DISCUSS/DSCN MKR DOCD: CPT | Performed by: PODIATRIST

## 2020-05-28 PROCEDURE — 1036F TOBACCO NON-USER: CPT | Performed by: PODIATRIST

## 2020-05-28 PROCEDURE — 3017F COLORECTAL CA SCREEN DOC REV: CPT | Performed by: PODIATRIST

## 2020-05-28 PROCEDURE — 99213 OFFICE O/P EST LOW 20 MIN: CPT | Performed by: PODIATRIST

## 2020-05-28 PROCEDURE — 1090F PRES/ABSN URINE INCON ASSESS: CPT | Performed by: PODIATRIST

## 2020-05-28 PROCEDURE — G8417 CALC BMI ABV UP PARAM F/U: HCPCS | Performed by: PODIATRIST

## 2020-05-28 PROCEDURE — 2022F DILAT RTA XM EVC RTNOPTHY: CPT | Performed by: PODIATRIST

## 2020-05-28 PROCEDURE — 11721 DEBRIDE NAIL 6 OR MORE: CPT | Performed by: PODIATRIST

## 2020-05-28 PROCEDURE — G8400 PT W/DXA NO RESULTS DOC: HCPCS | Performed by: PODIATRIST

## 2020-05-28 PROCEDURE — G8427 DOCREV CUR MEDS BY ELIG CLIN: HCPCS | Performed by: PODIATRIST

## 2020-05-28 NOTE — PROGRESS NOTES
[facility]  [address] [facility] [address]  Dept: [phone]    DIABETIC NAIL & BENIGN NEOPLASM PROGRESS NOTE  Date of patient's visit: 5/28/2020  Patient's Name:  Ciara Llamas YOB: 1950            Patient Care Team:  RANDY Ann CNP as PCP - General (Nurse Practitioner)  RANDY Ann CNP as PCP - [facility]  Vivienne Godinez MD as Consulting Physician (Nephrology)  Tanna Garibay DPM as Consulting Physician ([place])  Sandra Bae MD as Consulting Physician (Rheumatology)  Mendel Ehlers, MD as Surgeon (Bariatric Surgery)  Chante Osborn DO as Consulting Physician (Pulmonology)  Fabio Johnson DPM as Physician (Podiatry)          Chief Complaint   Patient presents with    Diabetes    Nail Problem    Benign Neoplasm         Subjective:   Ciara Llamas comes to clinic for Diabetes; Nail Problem; and Benign Neoplasm    she is a diabetic and states that feet are painful. Pt currently has complaint of thickened, elongated nails that they cannot manage by themselves. Pt's primary care physician is RANDY Paniagua CNP last seen December 2 2019   Pt's last blood sugar was 189. She complains of painful lesions on herfeet. . They have been present for 3 month(s) duration. The lesions are present on the left foot. The patient has 1 lesion that is deep seated andhas a painful core. Treatment has included previous podiatry treatment    She also has new complaint of increased swelling to yudy LE. Lab Results   Component Value Date    LABA1C 7.3 02/03/2020      Complains of numbness in the feet bilat.   Past Medical History:   Diagnosis Date    Acute respiratory failure ([address]     Anemia     Blood transfusion    Anxiety     Chronic kidney disease     GERD (gastroesophageal reflux disease)     H/O transfusion 02/2018    Anemia    Hiatal hernia     Hyperlipidemia    

## 2020-06-03 RX ORDER — FUROSEMIDE 40 MG/1
TABLET ORAL
Qty: 30 TABLET | Refills: 0 | Status: SHIPPED | OUTPATIENT
Start: 2020-06-03 | End: 2020-07-29

## 2020-06-03 NOTE — TELEPHONE ENCOUNTER
Juan Carlos, [address] [facility]            Patient Active Problem List:     Mixed hyperlipidemia     Essential (primary) hypertension     Anxiety     Gastroesophageal reflux disease without esophagitis     Restless leg syndrome     Acute respiratory failure (HCC)     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     Coagulopathy (HCC)     Benign essential HTN     Failure to wean from mechanical ventilation (HCC)     History of repair of hip joint     Osteoarthritis     Respiratory failure (HCC)     Decreased muscle tone     Scar condition and fibrosis of skin     Lumbar canal stenosis     Type 2 diabetes mellitus with hyperglycemia, without long-term current use of insulin (HCC)     Pneumonia     Sepsis (HCC)     Constipation     BLANE (acute kidney injury) ([facility] [address]     Hx of pulmonary embolus     Moderate malnutrition (HCC)     Pneumonia due to organism     Supratherapeutic INR     Hiatal hernia

## 2020-07-23 ENCOUNTER — OFFICE VISIT (OUTPATIENT)
Dept: PODIATRY | Age: 70
End: 2020-07-23
Payer: MEDICARE

## 2020-07-23 VITALS — WEIGHT: 174 LBS | HEIGHT: 63 IN | BODY MASS INDEX: 30.83 KG/M2 | TEMPERATURE: 97 F

## 2020-07-23 PROCEDURE — 4040F PNEUMOC VAC/ADMIN/RCVD: CPT | Performed by: PODIATRIST

## 2020-07-23 PROCEDURE — 2022F DILAT RTA XM EVC RTNOPTHY: CPT | Performed by: PODIATRIST

## 2020-07-23 PROCEDURE — 3017F COLORECTAL CA SCREEN DOC REV: CPT | Performed by: PODIATRIST

## 2020-07-23 PROCEDURE — G8417 CALC BMI ABV UP PARAM F/U: HCPCS | Performed by: PODIATRIST

## 2020-07-23 PROCEDURE — 3051F HG A1C>EQUAL 7.0%<8.0%: CPT | Performed by: PODIATRIST

## 2020-07-23 PROCEDURE — 17110 DESTRUCTION B9 LES UP TO 14: CPT | Performed by: PODIATRIST

## 2020-07-23 PROCEDURE — 1090F PRES/ABSN URINE INCON ASSESS: CPT | Performed by: PODIATRIST

## 2020-07-23 PROCEDURE — G8427 DOCREV CUR MEDS BY ELIG CLIN: HCPCS | Performed by: PODIATRIST

## 2020-07-23 PROCEDURE — 1036F TOBACCO NON-USER: CPT | Performed by: PODIATRIST

## 2020-07-23 PROCEDURE — 1123F ACP DISCUSS/DSCN MKR DOCD: CPT | Performed by: PODIATRIST

## 2020-07-23 PROCEDURE — 99213 OFFICE O/P EST LOW 20 MIN: CPT | Performed by: PODIATRIST

## 2020-07-23 PROCEDURE — G8400 PT W/DXA NO RESULTS DOC: HCPCS | Performed by: PODIATRIST

## 2020-07-23 NOTE — PROGRESS NOTES
[address] [facility] [id number] [facility] [address]  Dept: [phone]    BENIGN NEOPLASM PROGRESS NOTE  Date of patient's visit: 7/23/2020  Patient's Name:  Arelis Ball YOB: 1950            Patient Care Team:  RANDY Aguirre CNP as PCP - General (Nurse Practitioner)  RANDY Aguirre CNP as PCP - [facility]  Gail Pacheco MD as Consulting Physician (Nephrology)  Angeles Olguin DPM as Consulting Physician (Angela Watts)  Pratibha Ahmadi MD as Consulting Physician (Rheumatology)  Paul Abdi MD as Surgeon (Bariatric Surgery)  Radha Pereira DO as Consulting Physician (Pulmonology)  Ai Turner DPM as Physician (Podiatry)        Arelis Ball is a 78 y.o. female with the chief complaint of painful lesions on her   feet. . They have been present for 1 month(s) duration. The lesions are present on the left foot. The patient has 1 lesion that is deep seated and has a painful core. Treatment has included previous podiatry treatment    Review of Systems    Review of Systems:  History obtained fromchart review and the patient  General ROS: negative for - chills, fatigue, fever, night sweats or weight gain  Constitutional: Negative for chills, diaphoresis, fatigue, fever and unexpected weight change. Musculoskeletal: Positive for arthralgias, gait problem and joint swelling. Neurological ROS: negative for -behavioral changes, confusion, headaches or seizures. Negative for weakness and numbness. Dermatological ROS: negative for - mole changes, rash  Cardiovascular: Negative for leg swelling. Gastrointestinal: Negative for constipation, diarrhea, nausea and vomiting. Lower extremity physical exam:    Dermatologic Exam:  Soft tissue lesion to the 4th webspace left foot with central core. Pain on palpation of lesion. Skin No rashes or nodules noted. .   Skin is thin, with flaky sloughing skin as well as decreased hair growth to the lower leg  Small red hemosiderin deposits seen dorsal foot   Musculoskeletal:     1st MPJ ROM decreased, Bilateral.  Muscle strength 5/5, Bilateral.  Pain present upon palpation of toenails 1-5, Bilateral. decreased medial longitudinal arch, Bilateral.  Ankle ROM decreased,Bilateral.    Dorsally contracted digits present digits 2, Bilateral.     Vascular: DP pulses 1/4 bilateral.  PT pulses 0/4 bilateral.   CFT <5 seconds, Bilateral.  Hair growth absent to the level of the digits, Bilateral.  Edema present, Bilateral.  Varicosities absent, Bilateral. Erythema absent, Bilateral    Neurological: Sensation diminshed to light touch to level of digits, Bilateral.  Protective sensation intact 6/10 sites via 5.07/10g [place]h Monofilament, Bilateral.  negative Tinel's, Bilateral.  negative Valleix sign, Bilateral.      Integument: Warm, dry, supple, Bilateral.  Open lesion absent, Bilateral.  Interdigital maceration absent to web spaces 4, Bilateral.  Nails 1-5 left and 1-5 right thickened > 3.0 mm, dystrophic and crumbly, discolored with subungual debris. Fissures absent, Bilateral.       Assessment:   Diagnosis Orders   1. Benign neoplasm of skin of left foot  35410 - [place] DESTRUCTION BENIGN LESIONS UP TO 14   2. Type II diabetes mellitus with peripheral circulatory disorder (HCC)  16466 - [place] DESTRUCTION BENIGN LESIONS UP TO 14   3. PVD (peripheral vascular disease) (HCC)  54888 - [place] DESTRUCTION BENIGN LESIONS UP TO 14   4. Heloma molle  06793 - [place] DESTRUCTION BENIGN LESIONS UP TO 14       Plan:  The lesion was partially debrided and salinocaine was applied with an apperature pad under occlusion. The patient will leave in place for 24-48 hours and than remove. The patient tolerated the procedure well and without complication.    Pt will follow up in 9 weeks     Electronically signed by Electronically signed by Imelda Georges DPM on 7/23/2020 at 11:15 AM

## 2020-07-29 RX ORDER — LANCETS
EACH MISCELLANEOUS
Qty: 100 EACH | Refills: 0 | Status: SHIPPED | OUTPATIENT
Start: 2020-07-29 | End: 2020-08-07 | Stop reason: SDUPTHER

## 2020-07-29 RX ORDER — FUROSEMIDE 40 MG/1
TABLET ORAL
Qty: 30 TABLET | Refills: 0 | Status: SHIPPED | OUTPATIENT
Start: 2020-07-29 | End: 2020-08-10

## 2020-07-29 NOTE — TELEPHONE ENCOUNTER
Last OV 12/02/2019    Health Maintenance   Topic Date Due    DTaP/Tdap/Td vaccine (1 - Tdap) 09/28/1968    Shingles Vaccine (1 of 2) 09/28/1999    DEXA (modify frequency per FRAX score)  09/28/2004    Pneumococcal 65+ years Vaccine (1 of 1 - PPSV23) 09/28/2014    Diabetic retinal exam  12/11/2018    Breast cancer screen  08/15/2019    Lipid screen  03/11/2020    Diabetic microalbuminuria test  08/26/2020    Flu vaccine (1) 09/01/2020    Annual Wellness Visit (AWV)  09/23/2020    Potassium monitoring  11/25/2020    Creatinine monitoring  11/25/2020    A1C test (Diabetic or Prediabetic)  02/03/2021    Diabetic foot exam  05/28/2021    Colon cancer screen colonoscopy  03/10/2026    Hepatitis C screen  Completed    Hepatitis A vaccine  Aged Out    Hib vaccine  Aged Out    Meningococcal (ACWY) vaccine  Aged Out             (applicable per patient's age: Cancer Screenings, Depression Screening, Fall Risk Screening, Immunizations)    Hemoglobin A1C (%)   Date Value   02/03/2020 7.3   11/25/2019 8.1 (H)   09/23/2019 6.6     Microalb/Crt.  Ratio (mcg/mg creat)   Date Value   08/26/2019 13     LDL Cholesterol (mg/dL)   Date Value   03/11/2019 77     LDL Calculated (mg/dL)   Date Value   11/13/2017 74     AST (U/L)   Date Value   11/25/2019 19     ALT (U/L)   Date Value   11/25/2019 28     BUN (mg/dL)   Date Value   11/25/2019 18      (goal A1C is < 7)   (goal LDL is <100) need 30-50% reduction from baseline     BP Readings from Last 3 Encounters:   12/02/19 122/84   10/07/19 128/81   09/23/19 118/82    (goal /80)      All Future Testing planned in [facility]:  Lab Frequency Next Occurrence   RAUL Digital Screen Bilateral [[id number]] Once 08/14/2020   DEXA Bone Density Axial Skeleton Once 08/14/2020       Next Visit Date:  Future Appointments   Date Time Provider Rachael Garsia   8/20/2020 10:45 AM Ki Olguin  [address]            Patient Active Problem List:     Mixed hyperlipidemia Essential (primary) hypertension     Anxiety     Gastroesophageal reflux disease without esophagitis     Restless leg syndrome     Acute respiratory failure (HCC)     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     Coagulopathy (HCC)     Benign essential HTN     Failure to wean from mechanical ventilation (HCC)     History of repair of hip joint     Osteoarthritis     Respiratory failure (HCC)     Decreased muscle tone     Scar condition and fibrosis of skin     Lumbar canal stenosis     Type 2 diabetes mellitus with hyperglycemia, without long-term current use of insulin (HCC)     Pneumonia     Sepsis (HCC)     Constipation     BLANE (acute kidney injury) ([address]     Hx of pulmonary embolus     Moderate malnutrition ([address]     Pneumonia due to organism     Supratherapeutic INR     Hiatal hernia Concern for malignancy  IR consult for biopsy Concern for malignancy?  - f/u GI  - f/u AFP

## 2020-07-29 NOTE — TELEPHONE ENCOUNTER
LV 12/12/19    Health Maintenance   Topic Date Due    DTaP/Tdap/Td vaccine (1 - Tdap) 09/28/1968    Shingles Vaccine (1 of 2) 09/28/1999    DEXA (modify frequency per FRAX score)  09/28/2004    Pneumococcal 65+ years Vaccine (1 of 1 - PPSV23) 09/28/2014    Diabetic retinal exam  12/11/2018    Breast cancer screen  08/15/2019    Lipid screen  03/11/2020    Diabetic microalbuminuria test  08/26/2020    Flu vaccine (1) 09/01/2020    Annual Wellness Visit (AWV)  09/23/2020    Potassium monitoring  11/25/2020    Creatinine monitoring  11/25/2020    A1C test (Diabetic or Prediabetic)  02/03/2021    Diabetic foot exam  05/28/2021    Colon cancer screen colonoscopy  03/10/2026    Hepatitis C screen  Completed    Hepatitis A vaccine  Aged Out    Hib vaccine  Aged Out    Meningococcal (ACWY) vaccine  Aged Out             (applicable per patient's age: Cancer Screenings, Depression Screening, Fall Risk Screening, Immunizations)    Hemoglobin A1C (%)   Date Value   02/03/2020 7.3   11/25/2019 8.1 (H)   09/23/2019 6.6     Microalb/Crt.  Ratio (mcg/mg creat)   Date Value   08/26/2019 13     LDL Cholesterol (mg/dL)   Date Value   03/11/2019 77     LDL Calculated (mg/dL)   Date Value   11/13/2017 74     AST (U/L)   Date Value   11/25/2019 19     ALT (U/L)   Date Value   11/25/2019 28     BUN (mg/dL)   Date Value   11/25/2019 18      (goal A1C is < 7)   (goal LDL is <100) need 30-50% reduction from baseline     BP Readings from Last 3 Encounters:   12/02/19 122/84   10/07/19 128/81   09/23/19 118/82    (goal /80)      All Future Testing planned in [facility]:  Lab Frequency Next Occurrence   RAUL Digital Screen Bilateral [[id number]] Once 08/14/2020   DEXA Bone Density Axial Skeleton Once 08/14/2020       Next Visit Date:  Future Appointments   Date Time Provider Rachael Garsia   8/20/2020 10:45 AM Cisco Stephens DPM [id number]            Patient Active Problem List:     Mixed hyperlipidemia     Essential (primary) hypertension     Anxiety     Gastroesophageal reflux disease without esophagitis     Restless leg syndrome     Acute respiratory failure (HCC)     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     Coagulopathy (HCC)     Benign essential HTN     Failure to wean from mechanical ventilation (HCC)     History of repair of hip joint     Osteoarthritis     Respiratory failure (HCC)     Decreased muscle tone     Scar condition and fibrosis of skin     Lumbar canal stenosis     Type 2 diabetes mellitus with hyperglycemia, without long-term current use of insulin (HCC)     Pneumonia     Sepsis (HCC)     Constipation     BLANE (acute kidney injury) ([address]     Hx of pulmonary embolus     Moderate malnutrition ([address]     Pneumonia due to organism     Supratherapeutic INR     Hiatal hernia

## 2020-08-07 RX ORDER — LANCETS
EACH MISCELLANEOUS
Qty: 100 EACH | Refills: 0 | Status: SHIPPED | OUTPATIENT
Start: 2020-08-07 | End: 2020-08-12 | Stop reason: SDUPTHER

## 2020-08-10 RX ORDER — ATORVASTATIN CALCIUM 20 MG/1
TABLET, FILM COATED ORAL
Qty: 90 TABLET | Refills: 0 | Status: SHIPPED | OUTPATIENT
Start: 2020-08-10 | End: 2021-08-30 | Stop reason: SDUPTHER

## 2020-08-10 RX ORDER — FUROSEMIDE 40 MG/1
TABLET ORAL
Qty: 30 TABLET | Refills: 0 | Status: SHIPPED | OUTPATIENT
Start: 2020-08-10 | End: 2020-09-28 | Stop reason: SDUPTHER

## 2020-08-12 RX ORDER — LANCETS
EACH MISCELLANEOUS
Qty: 100 EACH | Refills: 0 | Status: SHIPPED | OUTPATIENT
Start: 2020-08-12 | End: 2021-08-30 | Stop reason: SDUPTHER

## 2020-08-12 NOTE — TELEPHONE ENCOUNTER
Received notice from pharmacy that Faby Sepulveda has to enroll in medicare to prescribe DME supplies, can you please approve and send for patient in the mean time.  Thank you

## 2020-08-24 ENCOUNTER — OFFICE VISIT (OUTPATIENT)
Dept: PRIMARY CARE CLINIC | Age: 70
End: 2020-08-24
Payer: MEDICARE

## 2020-08-24 VITALS
WEIGHT: 167 LBS | HEART RATE: 108 BPM | DIASTOLIC BLOOD PRESSURE: 80 MMHG | BODY MASS INDEX: 29.59 KG/M2 | SYSTOLIC BLOOD PRESSURE: 124 MMHG | OXYGEN SATURATION: 97 % | HEIGHT: 63 IN | RESPIRATION RATE: 15 BRPM

## 2020-08-24 PROBLEM — I26.02 SADDLE EMBOLUS OF PULMONARY ARTERY WITH ACUTE COR PULMONALE (HCC): Status: ACTIVE | Noted: 2020-08-24

## 2020-08-24 LAB — HBA1C MFR BLD: 12.5 %

## 2020-08-24 PROCEDURE — G8427 DOCREV CUR MEDS BY ELIG CLIN: HCPCS | Performed by: NURSE PRACTITIONER

## 2020-08-24 PROCEDURE — 4040F PNEUMOC VAC/ADMIN/RCVD: CPT | Performed by: NURSE PRACTITIONER

## 2020-08-24 PROCEDURE — 83036 HEMOGLOBIN GLYCOSYLATED A1C: CPT | Performed by: NURSE PRACTITIONER

## 2020-08-24 PROCEDURE — 99214 OFFICE O/P EST MOD 30 MIN: CPT | Performed by: NURSE PRACTITIONER

## 2020-08-24 PROCEDURE — 2022F DILAT RTA XM EVC RTNOPTHY: CPT | Performed by: NURSE PRACTITIONER

## 2020-08-24 PROCEDURE — 1090F PRES/ABSN URINE INCON ASSESS: CPT | Performed by: NURSE PRACTITIONER

## 2020-08-24 PROCEDURE — 1123F ACP DISCUSS/DSCN MKR DOCD: CPT | Performed by: NURSE PRACTITIONER

## 2020-08-24 PROCEDURE — G8400 PT W/DXA NO RESULTS DOC: HCPCS | Performed by: NURSE PRACTITIONER

## 2020-08-24 PROCEDURE — G8417 CALC BMI ABV UP PARAM F/U: HCPCS | Performed by: NURSE PRACTITIONER

## 2020-08-24 PROCEDURE — 3046F HEMOGLOBIN A1C LEVEL >9.0%: CPT | Performed by: NURSE PRACTITIONER

## 2020-08-24 PROCEDURE — 3017F COLORECTAL CA SCREEN DOC REV: CPT | Performed by: NURSE PRACTITIONER

## 2020-08-24 PROCEDURE — 1036F TOBACCO NON-USER: CPT | Performed by: NURSE PRACTITIONER

## 2020-08-24 RX ORDER — LORAZEPAM 0.5 MG/1
0.5 TABLET ORAL 2 TIMES DAILY PRN
Qty: 60 TABLET | Refills: 3 | Status: SHIPPED | OUTPATIENT
Start: 2020-08-24 | End: 2020-09-28 | Stop reason: SDUPTHER

## 2020-08-24 RX ORDER — ROPINIROLE 0.5 MG/1
0.5 TABLET, FILM COATED ORAL 3 TIMES DAILY
Qty: 90 TABLET | Refills: 3 | Status: CANCELLED | OUTPATIENT
Start: 2020-08-24

## 2020-08-24 RX ORDER — ROPINIROLE 0.5 MG/1
0.5 TABLET, FILM COATED ORAL 3 TIMES DAILY PRN
Qty: 90 TABLET | Refills: 3 | Status: SHIPPED | OUTPATIENT
Start: 2020-08-24 | End: 2021-12-06 | Stop reason: SDUPTHER

## 2020-08-24 RX ORDER — SEMAGLUTIDE 1.34 MG/ML
1 INJECTION, SOLUTION SUBCUTANEOUS WEEKLY
Qty: 5 PEN | Refills: 3 | Status: SHIPPED | OUTPATIENT
Start: 2020-08-24 | End: 2022-03-07

## 2020-08-24 RX ORDER — LORAZEPAM 0.5 MG/1
TABLET ORAL
Status: CANCELLED | OUTPATIENT
Start: 2020-08-24

## 2020-08-24 ASSESSMENT — ENCOUNTER SYMPTOMS
COUGH: 0
ABDOMINAL PAIN: 0
BACK PAIN: 0
SHORTNESS OF BREATH: 0

## 2020-08-24 ASSESSMENT — PATIENT HEALTH QUESTIONNAIRE - PHQ9
1. LITTLE INTEREST OR PLEASURE IN DOING THINGS: 0
SUM OF ALL RESPONSES TO PHQ9 QUESTIONS 1 & 2: 0
SUM OF ALL RESPONSES TO PHQ QUESTIONS 1-9: 0
2. FEELING DOWN, DEPRESSED OR HOPELESS: 0
SUM OF ALL RESPONSES TO PHQ QUESTIONS 1-9: 0

## 2020-08-24 NOTE — PROGRESS NOTES
(restless legs syndrome)     Type 2 diabetes mellitus without complication ([facility] [address] 59[address]    on metformin    Vocal cord paralysis 2014    [facility]ocal cord surgery in [facility]    Wears glasses       Past Surgical History:   Procedure Laterality Date    BREAST REDUCTION SURGERY  '90's    COLONOSCOPY  2016    no polyp    GASTRIC FUNDOPLICATION  28/80/7772    robotic assisted hiatal hernia repair    HAMMER TOE SURGERY Bilateral 2000, 2003    HIP ARTHROPLASTY Bilateral 2001, 2005    SC LAP,ESOPHAGOGAST FUNDOPLASTY N/A 5/21/2018    XI ROBOTIC HIATAL HERNIA REPAIR WITH NISSEN FUNDOPLICATION LAPAROSCOPIC, ENDOSEALER performed by Seema Lujan MD at [address]  2016    VOCAL CORD AUGMENTATION W/RADIESSE INJ  09/30/2014    vocal cord surgeryi       Family History   Problem Relation Age of Onset    Kidney Disease Mother         Dialysis    Liver Cancer Father     Arthritis Brother     Diabetes Maternal Grandmother     No Known Problems Maternal Grandfather     No Known Problems Paternal Grandmother     No Known Problems Paternal Grandfather     Diabetes Maternal Aunt     Scoliosis Son           Social History     Tobacco Use    Smoking status: Never Smoker    Smokeless tobacco: Never Used   Substance Use Topics    Alcohol use: No      Current Outpatient Medications   Medication Sig Dispense Refill    rOPINIRole (REQUIP) 0.5 MG tablet Take 1 tablet by mouth 3 times daily as needed (restless  legs) Indications: Restless Leg Syndrome, usually just needs once a day 90 tablet 3    Semaglutide,0.25 or 0.5MG/DOS, (OZEMPIC, 0.25 OR 0.5 MG/DOSE,) 2 MG/1.5ML SOPN Inject 1 mg into the skin once a week 5 pen 3    LORazepam (ATIVAN) 0.5 MG tablet Take 1 tablet by mouth 2 times daily as needed for Anxiety for up to 30 days.  60 tablet 3    Accu-Chek Softclix Lancets [facility] USE   TO CHECK GLUCOSE TWICE DAILY 100 each 0    atorvastatin (LIPITOR) 20 MG tablet Take 1 tablet by mouth once daily 90 tablet 0    furosemide (LASIX) 40 MG tablet Take 1 tablet by mouth once daily 30 tablet 0    blood glucose test strips (ASCENSIA AUTODISC VI;ONE TOUCH ULTRA TEST VI) strip USE STRIP TO CHECK GLUCOSE TWICE DAILY 100 each 0    Handicap Placard MISC by Other route Expires 2/2025 1 each 0    aspirin 81 MG chewable tablet Take 81 mg by mouth daily      acetaminophen (TYLENOL ARTHRITIS PAIN) 650 MG extended release tablet Take 1,300 mg by mouth 3 times daily       Alcohol Swabs PADS Need to use  twice a day 100 each 3    Blood Glucose Monitoring Suppl FLAVIA Glucometer Accu check. Test Blood Sugars 2 Times Daily. Dx: E 11.22, N 18.3 type II DM 1 Device 0     No current facility-administered medications for this visit. No Known Allergies    Health Maintenance   Topic Date Due    DTaP/Tdap/Td vaccine (1 - Tdap) 09/28/1968    Shingles Vaccine (1 of 2) 09/28/1999    DEXA (modify frequency per FRAX score)  09/28/2004    Pneumococcal 65+ years Vaccine (1 of 1 - PPSV23) 09/28/2014    Diabetic retinal exam  12/11/2018    Breast cancer screen  08/15/2019    Lipid screen  03/11/2020    A1C test (Diabetic or Prediabetic)  05/03/2020    Diabetic microalbuminuria test  08/26/2020    Annual Wellness Visit (AWV)  09/23/2020    Flu vaccine (1) 09/01/2020    Potassium monitoring  11/25/2020    Creatinine monitoring  11/25/2020    Diabetic foot exam  05/28/2021    Colon cancer screen colonoscopy  03/10/2026    Hepatitis C screen  Completed    Hepatitis A vaccine  Aged Out    Hib vaccine  Aged Out    Meningococcal (ACWY) vaccine  Aged Out       Subjective:      Review of Systems   Constitutional: Negative for chills, fatigue and fever. HENT: Negative for congestion. Eyes: Negative for visual disturbance. Respiratory: Negative for cough and shortness of breath (at baseline). Cardiovascular: Negative for chest pain, palpitations and leg swelling.    Gastrointestinal: Negative for abdominal pain.   Genitourinary: Negative for dysuria. Musculoskeletal: Negative for back pain. Neurological: Negative for dizziness, numbness and headaches. Psychiatric/Behavioral: Negative for self-injury, sleep disturbance and suicidal ideas. The patient is nervous/anxious. Objective:     Physical Exam  Vitals signs and nursing note reviewed. Constitutional:       Appearance: She is well-developed. HENT:      Head: Normocephalic and atraumatic. Eyes:      Pupils: Pupils are equal, round, and reactive to light. Neck:      Musculoskeletal: Normal range of motion and neck supple. Cardiovascular:      Rate and Rhythm: Normal rate and regular rhythm. Heart sounds: Normal heart sounds. Pulmonary:      Effort: Pulmonary effort is normal.      Breath sounds: Normal breath sounds. Abdominal:      General: Bowel sounds are normal.      Palpations: Abdomen is soft. Tenderness: There is no abdominal tenderness. Musculoskeletal: Normal range of motion. Skin:     General: Skin is warm and dry. Neurological:      Mental Status: She is alert and oriented to person, place, and time. Psychiatric:         Behavior: Behavior normal.         Thought Content: Thought content normal.         Judgment: Judgment normal.       /80   Pulse 108   Resp 15   Ht 5' 3\" (1.6 m)   Wt 167 lb (75.8 kg)   SpO2 97%   Breastfeeding No   BMI 29.58 kg/m²     Assessment:       Diagnosis Orders   1. Type 2 diabetes mellitus with hyperglycemia, without long-term current use of insulin ([facility])  POCT glycosylated hemoglobin (Hb A1C)    Semaglutide,0.25 or 0.5MG/DOS, (OZEMPIC, 0.25 OR 0.5 MG/DOSE,) 2 MG/1.5ML SOPN    Comprehensive Metabolic Panel   2. Restless leg syndrome  rOPINIRole (REQUIP) 0.5 MG tablet   3. Visit for screening mammogram  RAUL DIGITAL SCREEN W OR WO CAD BILATERAL   4. Failure to wean from mechanical ventilation (HCC)     5. Moderate malnutrition ([address]     6.  Saddle embolus of pulmonary artery with acute cor pulmonale, unspecified chronicity (HCC)     7. Coagulopathy ([facility] [address]     8. Anxiety  LORazepam (ATIVAN) 0.5 MG tablet   9. Screening for thyroid disorder  TSH with Reflex   10. Screening for deficiency anemia  CBC   11. Hyperlipidemia, unspecified hyperlipidemia type  Lipid Panel             Plan:      Return in about 3 months (around 11/24/2020) for AWV. 1. DM- Hga1c 12. 5! Elevated. Encouraged diet/exercise. Resume ozempic 1mg weekly. Given samples in office. Follow up in 3 months for recheck/repeat Hga1c.  2. Anxiety- Stable. Continue ativan at current dose. Follow up in 3 months for recheck. 3. Health maintenance- Rx given for annual labs and mammogram. Follow up in 3 months for recheck. Orders Placed This Encounter   Procedures    RAUL DIGITAL SCREEN W OR WO CAD BILATERAL     Standing Status:   Future     Standing Expiration Date:   10/24/2021    TSH with Reflex     Standing Status:   Future     Standing Expiration Date:   8/24/2021    CBC     Standing Status:   Future     Standing Expiration Date:   8/24/2021    Comprehensive Metabolic Panel     Standing Status:   Future     Standing Expiration Date:   8/24/2021    Lipid Panel     Standing Status:   Future     Standing Expiration Date:   8/24/2021     Order Specific Question:   Is Patient Fasting?/# of Hours     Answer:   12    POCT glycosylated hemoglobin (Hb A1C)        Orders Placed This Encounter   Medications    rOPINIRole (REQUIP) 0.5 MG tablet     Sig: Take 1 tablet by mouth 3 times daily as needed (restless  legs) Indications: Restless Leg Syndrome, usually just needs once a day     Dispense:  90 tablet     Refill:  3    Semaglutide,0.25 or 0.5MG/DOS, (OZEMPIC, 0.25 OR 0.5 MG/DOSE,) 2 MG/1.5ML SOPN     Sig: Inject 1 mg into the skin once a week     Dispense:  5 pen     Refill:  3    LORazepam (ATIVAN) 0.5 MG tablet     Sig: Take 1 tablet by mouth 2 times daily as needed for Anxiety for up to 30 days.      Dispense:  60 tablet Refill:  3       Patient given educational materials - see patient instructions. Discussed use, benefit, and side effects of prescribed medications. All patientquestions answered. Pt voiced understanding. Reviewed health maintenance. Instructedto continue current medications, diet and exercise. Patient agreed with treatmentplan. Follow up as directed.      Electronicallysigned by RANDY Marie CNP on 8/24/2020 at 11:18 AM

## 2020-08-31 ENCOUNTER — HOSPITAL ENCOUNTER (OUTPATIENT)
Dept: MAMMOGRAPHY | Age: 70
Discharge: HOME | End: 2020-09-02
Payer: MEDICARE

## 2020-08-31 PROCEDURE — 77063 BREAST TOMOSYNTHESIS BI: CPT

## 2020-09-20 LAB
CREATININE URINE: 202 MG/DL
MICROALBUMIN/CREAT 24H UR: 8 MG/G{CREAT}

## 2020-09-22 ENCOUNTER — OFFICE VISIT (OUTPATIENT)
Dept: PODIATRY | Age: 70
End: 2020-09-22
Payer: MEDICARE

## 2020-09-22 VITALS — WEIGHT: 167 LBS | HEIGHT: 63 IN | BODY MASS INDEX: 29.59 KG/M2 | TEMPERATURE: 97.5 F

## 2020-09-22 PROCEDURE — G8400 PT W/DXA NO RESULTS DOC: HCPCS | Performed by: PODIATRIST

## 2020-09-22 PROCEDURE — 99213 OFFICE O/P EST LOW 20 MIN: CPT | Performed by: PODIATRIST

## 2020-09-22 PROCEDURE — 1090F PRES/ABSN URINE INCON ASSESS: CPT | Performed by: PODIATRIST

## 2020-09-22 PROCEDURE — G8417 CALC BMI ABV UP PARAM F/U: HCPCS | Performed by: PODIATRIST

## 2020-09-22 PROCEDURE — 3017F COLORECTAL CA SCREEN DOC REV: CPT | Performed by: PODIATRIST

## 2020-09-22 PROCEDURE — 3046F HEMOGLOBIN A1C LEVEL >9.0%: CPT | Performed by: PODIATRIST

## 2020-09-22 PROCEDURE — G8427 DOCREV CUR MEDS BY ELIG CLIN: HCPCS | Performed by: PODIATRIST

## 2020-09-22 PROCEDURE — 11721 DEBRIDE NAIL 6 OR MORE: CPT | Performed by: PODIATRIST

## 2020-09-22 PROCEDURE — 1123F ACP DISCUSS/DSCN MKR DOCD: CPT | Performed by: PODIATRIST

## 2020-09-22 PROCEDURE — 4040F PNEUMOC VAC/ADMIN/RCVD: CPT | Performed by: PODIATRIST

## 2020-09-22 PROCEDURE — 1036F TOBACCO NON-USER: CPT | Performed by: PODIATRIST

## 2020-09-22 PROCEDURE — 17110 DESTRUCTION B9 LES UP TO 14: CPT | Performed by: PODIATRIST

## 2020-09-22 PROCEDURE — 2022F DILAT RTA XM EVC RTNOPTHY: CPT | Performed by: PODIATRIST

## 2020-09-22 NOTE — PROGRESS NOTES
[facility] [place] PODIATRY  [address] [facility] [address]  Dept: [phone]    DIABETIC NAIL & BENIGN NEOPLASM PROGRESS NOTE  Date of patient's visit: 9/22/2020  Patient's Name:  Nette Marcelino YOB: 1950            Patient Care Team:  RANDY Sandoval CNP as PCP - General (Nurse Practitioner)  RANDY Sandoval CNP as PCP - [facility] Empaneled Provider  Carlos Lockett MD as Consulting Physician (Nephrology)  Nelia Dolan DPM as Consulting Physician (Roney Obrien)  Liliana Calix MD as Consulting Physician (Rheumatology)  Dewayne Jules DO as Consulting Physician (Pulmonology)  Iglesia Goldsmith DPM as Physician (Podiatry)          Chief Complaint   Patient presents with    Nail Problem    Foot Pain    Benign Neoplasm    Peripheral Neuropathy    Diabetes         Subjective:   Nette Marcelino comes to clinic for Nail Problem; Foot Pain; Benign Neoplasm; Peripheral Neuropathy; and Diabetes    she is a diabetic and states that toes are painful. Pt currently has complaint of thickened, elongated nails that they cannot manage by themselves. Pt's primary care physician is RANDY Natarajan CNP last seen august 24 2020   Pt's last blood sugar was 229. She also relates to increased painful lesions on her left foot. . They have been present for 2 month(s) duration. The lesions are present on the left foot. The patient has 1 lesion that is deep seated and has a painful core. Treatment has included previous podiatry treatment. States there is increased swelling to left foot       Lab Results   Component Value Date    LABA1C 12.5 08/24/2020      Complains of numbness in the feet bilat.   Past Medical History:   Diagnosis Date    Acute respiratory failure ([facility] [address]     Anemia     Blood transfusion    Anxiety     Chronic kidney disease     GERD (gastroesophageal reflux disease)     H/O transfusion 02/2018    Anemia    Hiatal hernia     Hyperlipidemia     Hypertension 1998    on Rx.  Neuropathy     Osteoarthritis     Pulmonary embolism ([facility] [address] 2014    on coumadin    RLS (restless legs syndrome)     Type 2 diabetes mellitus without complication ([facility] [address] 2458    on metformin    Vocal cord paralysis 2014    vocal cord surgery in [facility]    Wears glasses        No Known Allergies  Current Outpatient Medications on File Prior to Visit   Medication Sig Dispense Refill    rOPINIRole (REQUIP) 0.5 MG tablet Take 1 tablet by mouth 3 times daily as needed (restless  legs) Indications: Restless Leg Syndrome, usually just needs once a day 90 tablet 3    Semaglutide,0.25 or 0.5MG/DOS, (OZEMPIC, 0.25 OR 0.5 MG/DOSE,) 2 MG/1.5ML SOPN Inject 1 mg into the skin once a week 5 pen 3    LORazepam (ATIVAN) 0.5 MG tablet Take 1 tablet by mouth 2 times daily as needed for Anxiety for up to 30 days. 60 tablet 3    Accu-Chek Softclix Lancets MISC USE   TO CHECK GLUCOSE TWICE DAILY 100 each 0    atorvastatin (LIPITOR) 20 MG tablet Take 1 tablet by mouth once daily 90 tablet 0    furosemide (LASIX) 40 MG tablet Take 1 tablet by mouth once daily 30 tablet 0    blood glucose test strips (ASCENSIA AUTODISC VI;ONE TOUCH ULTRA TEST VI) strip USE STRIP TO CHECK GLUCOSE TWICE DAILY 100 each 0    Handicap Placard MISC by Other route Expires 2/2025 1 each 0    aspirin 81 MG chewable tablet Take 81 mg by mouth daily      acetaminophen (TYLENOL ARTHRITIS PAIN) 650 MG extended release tablet Take 1,300 mg by mouth 3 times daily       Alcohol Swabs PADS Need to use  twice a day 100 each 3    Blood Glucose Monitoring Suppl FLAVIA Glucometer Accu check. Test Blood Sugars 2 Times Daily. Dx: E 11.22, N 18.3 type II DM 1 Device 0     No current facility-administered medications on file prior to visit.         Review of Systems      Review of Systems:   History obtained from chart review and the patient  General ROS: negative for - chills, fatigue, fever, night sweats or weight gain  Constitutional: Negativefor chills, diaphoresis, fatigue, fever and unexpected weight change. Musculoskeletal: Positive for arthralgias, gait problem and joint swelling. Neurological ROS: negative for - behavioral changes, confusion, headaches or seizures. Negative for weakness and numbness. Dermatological ROS: negative for - mole changes,rash  Cardiovascular: Negative for leg swelling. Gastrointestinal: Negative for constipation, diarrhea, nausea and vomiting. Objective:  Dermatologic Exam:  Soft tissue lesion to the left 4th interspace with central core and petechiae. Pain on palpation of lesion. Skin No rashes or nodules noted. .   Skin is thin, with flaky sloughing skin as well as decreased hair growth to the lower leg  Small red hemosiderin deposits seen dorsal foot   Musculoskeletal:     1st MPJ ROM decreased, Bilateral.  Muscle strength 5/5, Bilateral.  Pain present upon palpation of toenails 1-5, Bilateral. decreased medial longitudinal arch, Bilateral.  Ankle ROM decreased,Bilateral.    Dorsally contracted digits present digits 2, Bilateral.     Vascular: DP pulses 1/4 bilateral.  PT pulses 0/4 bilateral.   CFT <5 seconds, Bilateral.  Hair growth absent to the level of the digits, Bilateral.  Edema present, Bilateral.  Varicosities absent, Bilateral. Erythema absent, Bilateral    Neurological: Sensation diminshed to light touch to level of digits, Bilateral.  Protective sensation intact 6/10 sites via 5.07/10g [place] Monofilament, Bilateral.  negative Tinel's, Bilateral.  negative Valleix sign, Bilateral.      Integument: Warm, dry, supple, Bilateral.  Open lesion absent, Bilateral.  Interdigital maceration absent to web spaces 4, Bilateral.  Nails 1-5 left and 1-5 right thickened > 3.0 mm, dystrophic and crumbly, discolored with subungual debris. Fissures absent, Bilateral.   General: AAO x 3 in NAD.     Derm  Toenail Description  Sites of Onychomycosis Involvement (Check affected area)  [x] [x] [x] [x] [x] [x] [x] [x] [x] [x]  5 4 3 2 1 1 2 3 4 5                          Right                                        Left    Thickness  [x] [x] [x] [x] [x] [x] [x] [x] [x] [x]  5 4 3 2 1 1 2 3 4 5                         Right                                        Left    Dystrophic Changes   [x] [x] [x] [x] [x] [x] [x] [x] [x] [x]  5 4 3 2 1 1 2 3 4 5                         Right                                        Left    Color   [x] [x] [x] [x] [x] [x] [x] [x] [x] [x]  5 4 3 2 1 1 2 3 4 5                          Right                                        Left    Incurvation/Ingrowin   [] [] [] [] [] [] [] [] [] []  5 4 3 2 1 1 2 3 4 5                         Right                                        Left    Inflammation/Pain   [x] [x] [x] [x] [x] [x] [x] [x] [x] [x]  5 4 3 2 1 1 2 3 4 5                         Right                                        Left      Visual inspection:  Deformity: hammertoe deformity yudy feet  amputation: absent  Skin lesions: as above  Edema: right- 2+ pittingedema, left- 2+ pitting edema  Sensory exam:  Monofilament sensation: abnormal - 6/10 via SW 5.07/10g monofilament to the plantar foot bilateral feet  Pulses: abnormal - 1/4 dorsalis pedis pulse and 1/4Posterior tibial pulse,   Pinprick: Impaired  Proprioception: Impaired  Vibration (128 Hz): Impaired       Assessment:  78 y.o. female with:   Diagnosis Orders   1. Benign neoplasm of skin of left foot  69014 - [place] DESTRUCTION BENIGN LESIONS UP TO 14    HM DIABETES FOOT EXAM   2. PVD (peripheral vascular disease) ([facility] [address]  65909 - [place] DEBRIDEMENT OF NAILS, 6 OR MORE    HM DIABETES FOOT EXAM   3. Dermatophytosis of nail  08391 - [place] DEBRIDEMENT OF NAILS, 6 OR MORE    HM DIABETES FOOT EXAM   4. Type II diabetes mellitus with peripheral circulatory disorder (HCC)  35793 - [place] DEBRIDEMENT OF NAILS, 6 OR MORE    HM DIABETES FOOT EXAM   5.  Heloma molle  76638 - [place] DESTRUCTION BENIGN LESIONS UP TO 14     DIABETES FOOT EXAM   6. Edema of lower extremity   DIABETES FOOT EXAM           Q7   []Yes  []No                Q8   [x]Yes  []No                     Q9   []Yes    []No    Plan:   Pt was evaluated and examined. Patient was given personalized discharge instructions. Nails 1-10 were debrided sharply in length and thickness with anipper and , without incident. Diagnosis was discussed with the pt and all of their questions were answered indetail. Proper foot hygiene and care was discussed with the pt. Informed patient on proper diabetic foot care and importance of tight glycemic control. Patient to check feet daily and contact the office with anyquestions/problems/concerns. Other comorbidity noted and will be managed by PCP. To address new complaint of increased swelling, recommend patient to wear compression stockings. Dispensed tubi  for yudy LE and instructed pt to wear at all times when walking. Pt may take NSAIDs as needed. The lesion was partially debrided and silver nitratewas applied with an apperature pad under occlusion. The patient will leave in place for 24-48 hours and than remove. The patient tolerated the procedure well and without complication.    Pt will follow up in 2months       Electronically signed by Casie Ramirez DPM on 9/22/2020 at 1:10 PM  9/22/2020

## 2020-09-28 RX ORDER — LORAZEPAM 0.5 MG/1
0.5 TABLET ORAL 2 TIMES DAILY PRN
Qty: 60 TABLET | Refills: 3 | Status: SHIPPED | OUTPATIENT
Start: 2020-09-28 | End: 2021-08-30 | Stop reason: SDUPTHER

## 2020-09-28 RX ORDER — FUROSEMIDE 40 MG/1
TABLET ORAL
Qty: 30 TABLET | Refills: 0 | Status: SHIPPED | OUTPATIENT
Start: 2020-09-28 | End: 2021-08-30 | Stop reason: SDUPTHER

## 2020-11-03 PROBLEM — J18.9 PNEUMONIA: Status: RESOLVED | Noted: 2018-02-17 | Resolved: 2020-11-03

## 2020-11-03 PROBLEM — I10 ESSENTIAL (PRIMARY) HYPERTENSION: Status: RESOLVED | Noted: 2017-03-13 | Resolved: 2020-11-03

## 2021-04-06 ENCOUNTER — OFFICE VISIT (OUTPATIENT)
Dept: PODIATRY | Age: 71
End: 2021-04-06
Payer: MEDICARE

## 2021-04-06 VITALS — BODY MASS INDEX: 29.77 KG/M2 | WEIGHT: 168 LBS | TEMPERATURE: 97 F | HEIGHT: 63 IN

## 2021-04-06 DIAGNOSIS — E11.51 TYPE II DIABETES MELLITUS WITH PERIPHERAL CIRCULATORY DISORDER (HCC): Primary | ICD-10-CM

## 2021-04-06 DIAGNOSIS — B35.1 DERMATOPHYTOSIS OF NAIL: ICD-10-CM

## 2021-04-06 DIAGNOSIS — I73.9 PVD (PERIPHERAL VASCULAR DISEASE) (HCC): ICD-10-CM

## 2021-04-06 DIAGNOSIS — D23.72 BENIGN NEOPLASM OF SKIN OF LEFT FOOT: ICD-10-CM

## 2021-04-06 PROCEDURE — 3017F COLORECTAL CA SCREEN DOC REV: CPT | Performed by: PODIATRIST

## 2021-04-06 PROCEDURE — 1123F ACP DISCUSS/DSCN MKR DOCD: CPT | Performed by: PODIATRIST

## 2021-04-06 PROCEDURE — 4040F PNEUMOC VAC/ADMIN/RCVD: CPT | Performed by: PODIATRIST

## 2021-04-06 PROCEDURE — 3046F HEMOGLOBIN A1C LEVEL >9.0%: CPT | Performed by: PODIATRIST

## 2021-04-06 PROCEDURE — G8400 PT W/DXA NO RESULTS DOC: HCPCS | Performed by: PODIATRIST

## 2021-04-06 PROCEDURE — 1036F TOBACCO NON-USER: CPT | Performed by: PODIATRIST

## 2021-04-06 PROCEDURE — 99213 OFFICE O/P EST LOW 20 MIN: CPT | Performed by: PODIATRIST

## 2021-04-06 PROCEDURE — 1090F PRES/ABSN URINE INCON ASSESS: CPT | Performed by: PODIATRIST

## 2021-04-06 PROCEDURE — G8427 DOCREV CUR MEDS BY ELIG CLIN: HCPCS | Performed by: PODIATRIST

## 2021-04-06 PROCEDURE — G8417 CALC BMI ABV UP PARAM F/U: HCPCS | Performed by: PODIATRIST

## 2021-04-06 PROCEDURE — 11721 DEBRIDE NAIL 6 OR MORE: CPT | Performed by: PODIATRIST

## 2021-04-06 PROCEDURE — 17110 DESTRUCTION B9 LES UP TO 14: CPT | Performed by: PODIATRIST

## 2021-04-06 PROCEDURE — 2022F DILAT RTA XM EVC RTNOPTHY: CPT | Performed by: PODIATRIST

## 2021-04-06 NOTE — PROGRESS NOTES
[facility] PODIATRY  [address] [facility] [address]  Dept: [phone]    DIABETIC NAIL & BENIGN NEOPLASM PROGRESS NOTE  Date of patient's visit: 4/6/2021  Patient's Name:  Bhavin Barnes YOB: 1950            Patient Care Team:  Kameron Nuñez MD as PCP - General (Family Medicine)  Osmin Mayo MD as Consulting Physician (Nephrology)  Maria T Toney DPM as Consulting Physician (Peace Carpio)  Saul Casper MD as Consulting Physician (Rheumatology)  Dorthula Sacks, DO as Consulting Physician (Pulmonology)  Jo Khan DPM as Physician (Podiatry)          Chief Complaint   Patient presents with    Diabetes    Nail Problem    Benign Neoplasm    Peripheral Neuropathy         Subjective:   Bhavin Barnes comes to clinic for Diabetes, Nail Problem, Benign Neoplasm, and Peripheral Neuropathy    she is a diabetic and states that left foot is painful. Pt currently has complaint of thickened, elongated nails that they cannot manage by themselves. Pt's primary care physician is Kameron Nuñez MD, MD last seen January 25 2021   Pt's last blood sugar was 200. She has new complaint of increased painful lesions on the left 4th and 5th digits. . They have been present for 6 month(s) duration. Lab Results   Component Value Date    LABA1C 12.5 08/24/2020      Complains of numbness in the feet bilat. Past Medical History:   Diagnosis Date    Acute respiratory failure ([address]     Anemia     Blood transfusion    Anxiety     Chronic kidney disease     GERD (gastroesophageal reflux disease)     H/O transfusion 02/2018    Anemia    Hiatal hernia     Hyperlipidemia     Hypertension 1998    on Rx.     Neuropathy     Osteoarthritis     Pulmonary embolism ([address] 2014    on coumadin    RLS (restless legs syndrome)     Type 2 diabetes mellitus without complication ([address] 2074    on metformin    Vocal cord paralysis 2014    [facility]    Wears glasses        No Known Allergies  Current Outpatient Medications on File Prior to Visit   Medication Sig Dispense Refill    furosemide (LASIX) 40 MG tablet Take 1 tablet by mouth once daily 30 tablet 0    rOPINIRole (REQUIP) 0.5 MG tablet Take 1 tablet by mouth 3 times daily as needed (restless  legs) Indications: Restless Leg Syndrome, usually just needs once a day 90 tablet 3    Semaglutide,0.25 or 0.5MG/DOS, (OZEMPIC, 0.25 OR 0.5 MG/DOSE,) 2 MG/1.5ML SOPN Inject 1 mg into the skin once a week 5 pen 3    Accu-Chek Softclix Lancets MISC USE   TO CHECK GLUCOSE TWICE DAILY 100 each 0    atorvastatin (LIPITOR) 20 MG tablet Take 1 tablet by mouth once daily 90 tablet 0    blood glucose test strips (ASCENSIA AUTODISC VI;ONE TOUCH ULTRA TEST VI) strip USE STRIP TO CHECK GLUCOSE TWICE DAILY 100 each 0    Handicap Placard MISC by Other route Expires 2/2025 1 each 0    aspirin 81 MG chewable tablet Take 81 mg by mouth daily      acetaminophen (TYLENOL ARTHRITIS PAIN) 650 MG extended release tablet Take 1,300 mg by mouth 3 times daily       Alcohol Swabs PADS Need to use  twice a day 100 each 3    Blood Glucose Monitoring Suppl FLAVIA Glucometer Accu check. Test Blood Sugars 2 Times Daily. Dx: E 11.22, N 18.3 type II DM 1 Device 0     No current facility-administered medications on file prior to visit. Review of Systems      Review of Systems:   History obtained from chart review and the patient  General ROS: negative for - chills, fatigue, fever, night sweats or weight gain  Constitutional: Negativefor chills, diaphoresis, fatigue, fever and unexpected weight change. Musculoskeletal: Positive for arthralgias, gait problem and joint swelling. Neurological ROS: negative for - behavioral changes, confusion, headaches or seizures. Negative for weakness and numbness. Dermatological ROS: negative for - mole changes,rash  Cardiovascular: Negative for leg swelling.    Gastrointestinal: Negative for constipation, diarrhea, nausea and vomiting. Objective:  Dermatologic Exam:  Soft tissue lesion to the left foot 4th interspace along 4th and 5th digits with central core and petechiae. Pain on palpation of lesion. Skin No rashes or nodules noted. .   Skin is thin, with flaky sloughing skin as well as decreased hair growth to the lower leg  Small red hemosiderin deposits seen dorsal foot   Musculoskeletal:     1st MPJ ROM decreased, Bilateral.  Muscle strength 5/5, Bilateral.  Pain present upon palpation of toenails 1-5, Bilateral. decreased medial longitudinal arch, Bilateral.  Ankle ROM decreased,Bilateral.    Dorsally contracted digits present digits 2, Bilateral.     Vascular: DP pulses 1/4 bilateral.  PT pulses 0/4 bilateral.   CFT <5 seconds, Bilateral.  Hair growth absent to the level of the digits, Bilateral.  Edema present, Bilateral.  Varicosities absent, Bilateral. Erythema absent, Bilateral    Neurological: Sensation diminshed to light touch to level of digits, Bilateral.  Protective sensation intact 6/10 sites via 5.07/10g [place]h Monofilament, Bilateral.  negative Tinel's, Bilateral.  negative Valleix sign, Bilateral.      Integument: Warm, dry, supple, Bilateral.  Open lesion absent, Bilateral.  Interdigital maceration absent to web spaces 4, Bilateral.  Nails 1-5 left and 1-5 right thickened > 3.0 mm, dystrophic and crumbly, discolored with subungual debris. Fissures absent, Bilateral.   General: AAO x 3 in NAD.     Derm  Toenail Description  Sites of Onychomycosis Involvement (Check affected area)  [x] [x] [x] [x] [x] [x] [x] [x] [x] [x]  5 4 3 2 1 1 2 3 4 5                          Right                                        Left    Thickness  [x] [x] [x] [x] [x] [x] [x] [x] [x] [x]  5 4 3 2 1 1 2 3 4 5                         Right                                        Left    Dystrophic Changes   [x] [x] [x] [x] [x] [x] [x] [x] [x] [x]  5 4 3 2 1 1 2 3 4 5 Right                                        Left    Color   [x] [x] [x] [x] [x] [x] [x] [x] [x] [x]  5 4 3 2 1 1 2 3 4 5                          Right                                        Left    Incurvation/Ingrowin   [] [] [] [] [] [] [] [] [] []  5 4 3 2 1 1 2 3 4 5                         Right                                        Left    Inflammation/Pain   [x] [x] [x] [x] [x] [x] [x] [x] [x] [x]  5 4 3 2 1 1 2 3 4 5                         Right                                        Left      Visual inspection:  Deformity: hammertoe deformity yudy feet  amputation: absent  Skin lesions: as above  Edema: right- 2+ pittingedema, left- 2+ pitting edema  Sensory exam:  Monofilament sensation: abnormal - 6/10 via SW 5.07/10g monofilament to the plantar foot bilateral feet  Pulses: abnormal - 1/4 dorsalis pedis pulse and 1/4Posterior tibial pulse,   Pinprick: Impaired  Proprioception: Impaired  Vibration (128 Hz): Impaired       Assessment:  69 y.o. female with:   Diagnosis Orders   1. Type II diabetes mellitus with peripheral circulatory disorder (HCC)  31827 - [place] DEBRIDEMENT OF NAILS, 6 OR MORE     DIABETES FOOT EXAM    83933 - [place] DESTRUCTION BENIGN LESIONS UP TO 14   2. Dermatophytosis of nail  22237 - [place] DEBRIDEMENT OF NAILS, 6 OR MORE     DIABETES FOOT EXAM   3. PVD (peripheral vascular disease) ([facility] [address]  [address]7550 - [place] DEBRIDEMENT OF NAILS, 6 OR MORE    HM DIABETES FOOT EXAM    58611 - [place] DESTRUCTION BENIGN LESIONS UP TO 14   4. Benign neoplasm of skin of left foot   DIABETES FOOT EXAM    81972 - [place] DESTRUCTION BENIGN LESIONS UP TO 14           Q7   []Yes  []No                Q8   [x]Yes  []No                     Q9   []Yes    []No    Plan:   Pt was evaluated and examined. Patient was given personalized discharge instructions. Nails 1-10 were debrided sharply in length and thickness with anipper and , without incident.   Diagnosis was discussed with the pt and all of their questions were answered indetail. Proper foot hygiene and care was discussed with the pt. Informed patient on proper diabetic foot care and importance of tight glycemic control. Patient to check feet daily and contact the office with anyquestions/problems/concerns. Other comorbidity noted and will be managed by PCP. The lesion was partially debrided and silver nitratewas applied with an apperature pad under occlusion. The patient will leave in place for 24-48 hours and than remove. The patient tolerated the procedure well and without complication.    Pt will follow up in 2months       Electronically signed by Tomas Meneses DPM on 4/6/2021 at 1:45 PM  4/6/2021

## 2021-05-18 ENCOUNTER — OFFICE VISIT (OUTPATIENT)
Dept: PODIATRY | Age: 71
End: 2021-05-18
Payer: MEDICARE

## 2021-05-18 VITALS — WEIGHT: 168 LBS | HEIGHT: 63 IN | BODY MASS INDEX: 29.77 KG/M2

## 2021-05-18 DIAGNOSIS — D23.72 BENIGN NEOPLASM OF SKIN OF LEFT FOOT: ICD-10-CM

## 2021-05-18 DIAGNOSIS — I73.9 PVD (PERIPHERAL VASCULAR DISEASE) (HCC): ICD-10-CM

## 2021-05-18 DIAGNOSIS — E11.51 TYPE II DIABETES MELLITUS WITH PERIPHERAL CIRCULATORY DISORDER (HCC): Primary | ICD-10-CM

## 2021-05-18 DIAGNOSIS — B35.1 DERMATOPHYTOSIS OF NAIL: ICD-10-CM

## 2021-05-18 PROCEDURE — 1123F ACP DISCUSS/DSCN MKR DOCD: CPT | Performed by: PODIATRIST

## 2021-05-18 PROCEDURE — 3017F COLORECTAL CA SCREEN DOC REV: CPT | Performed by: PODIATRIST

## 2021-05-18 PROCEDURE — 2022F DILAT RTA XM EVC RTNOPTHY: CPT | Performed by: PODIATRIST

## 2021-05-18 PROCEDURE — G8417 CALC BMI ABV UP PARAM F/U: HCPCS | Performed by: PODIATRIST

## 2021-05-18 PROCEDURE — G8427 DOCREV CUR MEDS BY ELIG CLIN: HCPCS | Performed by: PODIATRIST

## 2021-05-18 PROCEDURE — 1090F PRES/ABSN URINE INCON ASSESS: CPT | Performed by: PODIATRIST

## 2021-05-18 PROCEDURE — 1036F TOBACCO NON-USER: CPT | Performed by: PODIATRIST

## 2021-05-18 PROCEDURE — 4040F PNEUMOC VAC/ADMIN/RCVD: CPT | Performed by: PODIATRIST

## 2021-05-18 PROCEDURE — 17110 DESTRUCTION B9 LES UP TO 14: CPT | Performed by: PODIATRIST

## 2021-05-18 PROCEDURE — 3046F HEMOGLOBIN A1C LEVEL >9.0%: CPT | Performed by: PODIATRIST

## 2021-05-18 PROCEDURE — 99213 OFFICE O/P EST LOW 20 MIN: CPT | Performed by: PODIATRIST

## 2021-05-18 PROCEDURE — G8400 PT W/DXA NO RESULTS DOC: HCPCS | Performed by: PODIATRIST

## 2021-05-18 NOTE — PROGRESS NOTES
[facility] PODIATRY  [address] [address]  Dept: [phone]    DIABETIC NAIL & BENIGN NEOPLASM PROGRESS NOTE  Date of patient's visit: 5/18/2021  Patient's Name:  Prashant Arias YOB: 1950            Patient Care Team:  Johnathon Hampton MD as PCP - General (Family Medicine)  Zach Cruz MD as Consulting Physician (Nephrology)  Yeison Marcelino DPM as Consulting Physician ([place] [place])  Heather Dodge MD as Consulting Physician (Rheumatology)  Anthony Mayfield DO as Consulting Physician (Pulmonology)  Marquise Pompa DPM as Physician (Podiatry)          Chief Complaint   Patient presents with    Diabetes    Nail Problem    Peripheral Neuropathy    Benign Neoplasm         Subjective:   Prashant Airas comes to clinic for Diabetes, Nail Problem, Peripheral Neuropathy, and Benign Neoplasm    she is a diabetic and states that skin lesions are painful. Pt's primary care physician is Johnathon Hampton MD, MD last seen January 25 2021   Pt's last blood sugar was 130. Painful lesions on herfeet. . They have been present for 6 week(s) duration. The lesions are present on the left foot. The patient has 1 lesion that is deep seated andhas a painful core. Lab Results   Component Value Date    LABA1C 12.5 08/24/2020      Complains of numbness in the feet bilat. Past Medical History:   Diagnosis Date    Acute respiratory failure ([address]     Anemia     Blood transfusion    Anxiety     Chronic kidney disease     GERD (gastroesophageal reflux disease)     H/O transfusion 02/2018    Anemia    Hiatal hernia     Hyperlipidemia     Hypertension 1998    on Rx.     Neuropathy     Osteoarthritis     Pulmonary embolism ([address] 2014    on coumadin    RLS (restless legs syndrome)     Type 2 diabetes mellitus without complication ([address] 8209    on metformin    Vocal cord paralysis 2014    vocal cord surgery in [facility]    Wears glasses        No Known Allergies  Current Outpatient Medications on File Prior to Visit   Medication Sig Dispense Refill    furosemide (LASIX) 40 MG tablet Take 1 tablet by mouth once daily 30 tablet 0    rOPINIRole (REQUIP) 0.5 MG tablet Take 1 tablet by mouth 3 times daily as needed (restless  legs) Indications: Restless Leg Syndrome, usually just needs once a day 90 tablet 3    Semaglutide,0.25 or 0.5MG/DOS, (OZEMPIC, 0.25 OR 0.5 MG/DOSE,) 2 MG/1.5ML SOPN Inject 1 mg into the skin once a week 5 pen 3    Accu-Chek Softclix Lancets MISC USE   TO CHECK GLUCOSE TWICE DAILY 100 each 0    atorvastatin (LIPITOR) 20 MG tablet Take 1 tablet by mouth once daily 90 tablet 0    blood glucose test strips (ASCENSIA AUTODISC VI;ONE TOUCH ULTRA TEST VI) strip USE STRIP TO CHECK GLUCOSE TWICE DAILY 100 each 0    Handicap Placard MISC by Other route Expires 2/2025 1 each 0    aspirin 81 MG chewable tablet Take 81 mg by mouth daily      acetaminophen (TYLENOL ARTHRITIS PAIN) 650 MG extended release tablet Take 1,300 mg by mouth 3 times daily       Alcohol Swabs PADS Need to use  twice a day 100 each 3    Blood Glucose Monitoring Suppl FLAVIA Glucometer Accu check. Test Blood Sugars 2 Times Daily. Dx: E 11.22, N 18.3 type II DM 1 Device 0     No current facility-administered medications on file prior to visit. Review of Systems      Review of Systems:   History obtained from chart review and the patient  General ROS: negative for - chills, fatigue, fever, night sweats or weight gain  Constitutional: Negativefor chills, diaphoresis, fatigue, fever and unexpected weight change. Musculoskeletal: Positive for arthralgias, gait problem and joint swelling. Neurological ROS: negative for - behavioral changes, confusion, headaches or seizures. Negative for weakness and numbness. Dermatological ROS: negative for - mole changes,rash  Cardiovascular: Negative for leg swelling.    Gastrointestinal: Negative for constipation, diarrhea, nausea and vomiting. Objective:  Dermatologic Exam:  Soft tissue lesion to the left 4th interspace with central core and petechiae. Pain on palpation of lesion. Skin No rashes or nodules noted. .   Skin is thin, with flaky sloughing skin as well as decreased hair growth to the lower leg  Small red hemosiderin deposits seen dorsal foot   Musculoskeletal:     1st MPJ ROM decreased, Bilateral.  Muscle strength 5/5, Bilateral.  Pain present upon palpation of toenails 1-5, Bilateral. decreased medial longitudinal arch, Bilateral.  Ankle ROM decreased,Bilateral.    Dorsally contracted digits present digits 2, Bilateral.     Vascular: DP pulses 1/4 bilateral.  PT pulses 0/4 bilateral.   CFT <5 seconds, Bilateral.  Hair growth absent to the level of the digits, Bilateral.  Edema present, Bilateral.  Varicosities absent, Bilateral. Erythema absent, Bilateral    Neurological: Sensation diminshed to light touch to level of digits, Bilateral.  Protective sensation intact 6/10 sites via 5.07/10g [place]-Mahesh Monofilament, Bilateral.  negative Tinel's, Bilateral.  negative Valleix sign, Bilateral.      Integument: Warm, dry, supple, Bilateral.  Open lesion absent, Bilateral.  Interdigital maceration absent to web spaces 4, Bilateral.  Nails 1-5 left and 1-5 right thickened > 3.0 mm, dystrophic and crumbly, discolored with subungual debris. Fissures absent, Bilateral.   General: AAO x 3 in NAD.     Derm  Toenail Description  Sites of Onychomycosis Involvement (Check affected area)  [x] [x] [x] [x] [x] [x] [x] [x] [x] [x]  5 4 3 2 1 1 2 3 4 5                          Right                                        Left    Thickness  [x] [x] [x] [x] [x] [x] [x] [x] [x] [x]  5 4 3 2 1 1 2 3 4 5                         Right                                        Left    Dystrophic Changes   [x] [x] [x] [x] [x] [x] [x] [x] [x] [x]  5 4 3 2 1 1 2 3 4 5                         Right                                        Left    Color [x] [x] [x] [x] [x] [x] [x] [x] [x] [x]  5 4 3 2 1 1 2 3 4 5                          Right                                        Left    Incurvation/Ingrowin   [] [] [] [] [] [] [] [] [] []  5 4 3 2 1 1 2 3 4 5                         Right                                        Left    Inflammation/Pain   [x] [x] [x] [x] [x] [x] [x] [x] [x] [x]  5 4 3 2 1 1 2 3 4 5                         Right                                        Left      Visual inspection:  Deformity: hammertoe deformity yudy feet  amputation: absent  Skin lesions: as above  Edema: right- 2+ pittingedema, left- 2+ pitting edema  Sensory exam:  Monofilament sensation: abnormal - 6/10 via SW 5.07/10g monofilament to the plantar foot bilateral feet  Pulses: abnormal - 1/4 dorsalis pedis pulse and 1/4Posterior tibial pulse,   Pinprick: Impaired  Proprioception: Impaired  Vibration (128 Hz): Impaired       Assessment:  69 y.o. female with:   Diagnosis Orders   1. Type II diabetes mellitus with peripheral circulatory disorder (HCC)   DIABETES FOOT EXAM   2. Dermatophytosis of nail   DIABETES FOOT EXAM   3. PVD (peripheral vascular disease) ([facility])   DIABETES FOOT EXAM   4. Benign neoplasm of skin of left foot  55953 - [place] DESTRUCTION BENIGN LESIONS UP TO 14     DIABETES FOOT EXAM           Q7   []Yes  []No                Q8   [x]Yes  []No                     Q9   []Yes    []No    Plan:   Pt was evaluated and examined. Patient was given personalized discharge instructions. Diagnosis was discussed with the pt and all of their questions were answered indetail. Proper foot hygiene and care was discussed with the pt. Informed patient on proper diabetic foot care and importance of tight glycemic control. Patient to check feet daily and contact the office with anyquestions/problems/concerns. Other comorbidity noted and will be managed by PCP.     All labs were reviewed and all imagining including the above findings were reviewed PRIOR to the patients arrival and with the patient today. Previous patient encounter was reviewed. Encounters from the patients other medical providers were reviewed and noted. Time was spent educating the patient and their families/caregivers on proper care of the feet and ankles. All the above diagnosis were addressed at todays visit and all questions were answered. A total of 20 minutes was spent with this patients encounter which included charting after the patients visit    The lesion was partially debrided and silver nitratewas applied with an apperature pad under occlusion. The patient will leave in place for 24-48 hours and than remove. The patient tolerated the procedure well and without complication.    Pt will follow up in 9weeks       Electronically signed by Rolando Anderson DPM on 5/18/2021 at 1:28 PM  5/18/2021

## 2021-07-27 ENCOUNTER — OFFICE VISIT (OUTPATIENT)
Dept: PODIATRY | Age: 71
End: 2021-07-27
Payer: MEDICARE

## 2021-07-27 DIAGNOSIS — B35.1 DERMATOPHYTOSIS OF NAIL: ICD-10-CM

## 2021-07-27 DIAGNOSIS — E11.51 TYPE II DIABETES MELLITUS WITH PERIPHERAL CIRCULATORY DISORDER (HCC): Primary | ICD-10-CM

## 2021-07-27 DIAGNOSIS — M79.604 PAIN IN BOTH LOWER EXTREMITIES: ICD-10-CM

## 2021-07-27 DIAGNOSIS — I73.9 PVD (PERIPHERAL VASCULAR DISEASE) (HCC): ICD-10-CM

## 2021-07-27 DIAGNOSIS — M79.605 PAIN IN BOTH LOWER EXTREMITIES: ICD-10-CM

## 2021-07-27 DIAGNOSIS — D23.72 BENIGN NEOPLASM OF SKIN OF LEFT FOOT: ICD-10-CM

## 2021-07-27 PROCEDURE — G8400 PT W/DXA NO RESULTS DOC: HCPCS | Performed by: PODIATRIST

## 2021-07-27 PROCEDURE — 1123F ACP DISCUSS/DSCN MKR DOCD: CPT | Performed by: PODIATRIST

## 2021-07-27 PROCEDURE — 1036F TOBACCO NON-USER: CPT | Performed by: PODIATRIST

## 2021-07-27 PROCEDURE — 2022F DILAT RTA XM EVC RTNOPTHY: CPT | Performed by: PODIATRIST

## 2021-07-27 PROCEDURE — 17110 DESTRUCTION B9 LES UP TO 14: CPT | Performed by: PODIATRIST

## 2021-07-27 PROCEDURE — 99213 OFFICE O/P EST LOW 20 MIN: CPT | Performed by: PODIATRIST

## 2021-07-27 PROCEDURE — 1090F PRES/ABSN URINE INCON ASSESS: CPT | Performed by: PODIATRIST

## 2021-07-27 PROCEDURE — 3017F COLORECTAL CA SCREEN DOC REV: CPT | Performed by: PODIATRIST

## 2021-07-27 PROCEDURE — G8428 CUR MEDS NOT DOCUMENT: HCPCS | Performed by: PODIATRIST

## 2021-07-27 PROCEDURE — 4040F PNEUMOC VAC/ADMIN/RCVD: CPT | Performed by: PODIATRIST

## 2021-07-27 PROCEDURE — G8417 CALC BMI ABV UP PARAM F/U: HCPCS | Performed by: PODIATRIST

## 2021-07-27 PROCEDURE — 11721 DEBRIDE NAIL 6 OR MORE: CPT | Performed by: PODIATRIST

## 2021-07-27 PROCEDURE — 3046F HEMOGLOBIN A1C LEVEL >9.0%: CPT | Performed by: PODIATRIST

## 2021-07-27 NOTE — PROGRESS NOTES
[facility] PODIATRY  [address] [place] [address] [address]  Dept: [phone]    DIABETIC NAIL & BENIGN NEOPLASM PROGRESS NOTE  Date of patient's visit: 7/27/2021  Patient's Name:  Anne Marie Wood YOB: 1950            Patient Care Team:  Basilio Barr MD as PCP - General (Family Medicine)  Oksana Fulton MD as Consulting Physician (Nephrology)  Selena Walker DPM as Consulting Physician ([facility]ylie Santos)  Jones Tamez MD as Consulting Physician (Rheumatology)  Geovanna Caraballo DO as Consulting Physician (Pulmonology)  Neeru Castelan DPM as Physician (Podiatry)          Chief Complaint   Patient presents with    Diabetes     bs 197    Nail Problem    Peripheral Neuropathy    Benign Neoplasm         Subjective:   Anne Marie Wood comes to clinic for Diabetes (bs 197), Nail Problem, Peripheral Neuropathy, and Benign Neoplasm    she is a diabetic and states that feet are painful. Pt currently has complaint of thickened, elongated nails that they cannot manage by themselves. Pt's primary care physician is Basilio Barr MD, MD last seen January 25 2021   Pt's last blood sugar was 197. Painful lesions on both feet. . They have been present for 2 month(s) duration. The lesions are present on the left and right foot. The patient has 2 lesion that is deep seated andhas a painful core. Treatment has included previous podiatry treatment       Lab Results   Component Value Date    LABA1C 12.5 08/24/2020      Complains of numbness in the feet bilat. Past Medical History:   Diagnosis Date    Acute respiratory failure ([address]     Anemia     Blood transfusion    Anxiety     Chronic kidney disease     GERD (gastroesophageal reflux disease)     H/O transfusion 02/2018    Anemia    Hiatal hernia     Hyperlipidemia     Hypertension 1998    on Rx.     Neuropathy     Osteoarthritis     Pulmonary embolism ([address] 2014    on coumadin    RLS (restless legs syndrome)     Type 2 diabetes mellitus without complication ([address] 2281    on metformin    Vocal cord paralysis 2014    [facility] in [facility]    Wears glasses        No Known Allergies  Current Outpatient Medications on File Prior to Visit   Medication Sig Dispense Refill    furosemide (LASIX) 40 MG tablet Take 1 tablet by mouth once daily 30 tablet 0    rOPINIRole (REQUIP) 0.5 MG tablet Take 1 tablet by mouth 3 times daily as needed (restless  legs) Indications: Restless Leg Syndrome, usually just needs once a day 90 tablet 3    Semaglutide,0.25 or 0.5MG/DOS, (OZEMPIC, 0.25 OR 0.5 MG/DOSE,) 2 MG/1.5ML SOPN Inject 1 mg into the skin once a week 5 pen 3    Accu-Chek Softclix Lancets MISC USE   TO CHECK GLUCOSE TWICE DAILY 100 each 0    atorvastatin (LIPITOR) 20 MG tablet Take 1 tablet by mouth once daily 90 tablet 0    blood glucose test strips (ASCENSIA AUTODISC VI;ONE TOUCH ULTRA TEST VI) strip USE STRIP TO CHECK GLUCOSE TWICE DAILY 100 each 0    Handicap Placard MISC by Other route Expires 2/2025 1 each 0    aspirin 81 MG chewable tablet Take 81 mg by mouth daily      acetaminophen (TYLENOL ARTHRITIS PAIN) 650 MG extended release tablet Take 1,300 mg by mouth 3 times daily       Alcohol Swabs PADS Need to use  twice a day 100 each 3    Blood Glucose Monitoring Suppl FLAVIA Glucometer Accu check. Test Blood Sugars 2 Times Daily. Dx: E 11.22, N 18.3 type II DM 1 Device 0     No current facility-administered medications on file prior to visit. Review of Systems      Review of Systems:   History obtained from chart review and the patient  General ROS: negative for - chills, fatigue, fever, night sweats or weight gain  Constitutional: Negativefor chills, diaphoresis, fatigue, fever and unexpected weight change. Musculoskeletal: Positive for arthralgias, gait problem and joint swelling. Neurological ROS: negative for - behavioral changes, confusion, headaches or seizures.  Negative for weakness and numbness. Dermatological ROS: negative for - mole changes,rash  Cardiovascular: Negative for leg swelling. Gastrointestinal: Negative for constipation, diarrhea, nausea and vomiting. Objective:  Dermatologic Exam:  Soft tissue lesion to the plantar right sub 5th MTH and left foot 4th interspace with central core and petechiae. Pain on palpation of lesion. Skin No rashes or nodules noted. .   Skin is thin, with flaky sloughing skin as well as decreased hair growth to the lower leg  Small red hemosiderin deposits seen dorsal foot   Musculoskeletal:     1st MPJ ROM decreased, Bilateral.  Muscle strength 5/5, Bilateral.  Pain present upon palpation of toenails 1-5, Bilateral. decreased medial longitudinal arch, Bilateral.  Ankle ROM decreased,Bilateral.    Dorsally contracted digits present digits 2, Bilateral.     Vascular: DP pulses 1/4 bilateral.  PT pulses 0/4 bilateral.   CFT <5 seconds, Bilateral.  Hair growth absent to the level of the digits, Bilateral.  Edema present, Bilateral.  Varicosities absent, Bilateral. Erythema absent, Bilateral    Neurological: Sensation diminshed to light touch to level of digits, Bilateral.  Protective sensation intact 6/10 sites via 5.07/10g [place]-Mahesh Monofilament, Bilateral.  negative Tinel's, Bilateral.  negative Valleix sign, Bilateral.      Integument: Warm, dry, supple, Bilateral.  Open lesion absent, Bilateral.  Interdigital maceration absent to web spaces 4, Bilateral.  Nails 1-5 left and 1-5 right thickened > 3.0 mm, dystrophic and crumbly, discolored with subungual debris. Fissures absent, Bilateral.   General: AAO x 3 in NAD.     Derm  Toenail Description  Sites of Onychomycosis Involvement (Check affected area)  [x] [x] [x] [x] [x] [x] [x] [x] [x] [x]  5 4 3 2 1 1 2 3 4 5                          Right                                        Left    Thickness  [x] [x] [x] [x] [x] [x] [x] [x] [x] [x]  5 4 3 2 1 1 2 3 4 5                         Right Left    Dystrophic Changes   [x] [x] [x] [x] [x] [x] [x] [x] [x] [x]  5 4 3 2 1 1 2 3 4 5                         Right                                        Left    Color   [x] [x] [x] [x] [x] [x] [x] [x] [x] [x]  5 4 3 2 1 1 2 3 4 5                          Right                                        Left    Incurvation/Ingrowin   [] [] [] [] [] [] [] [] [] []  5 4 3 2 1 1 2 3 4 5                         Right                                        Left    Inflammation/Pain   [x] [x] [x] [x] [x] [x] [x] [x] [x] [x]  5 4 3 2 1 1 2 3 4 5                         Right                                        Left      Visual inspection:  Deformity: hammertoe deformity yudy feet  amputation: absent  Skin lesions: as above  Edema: right- 2+ pittingedema, left- 2+ pitting edema  Sensory exam:  Monofilament sensation: abnormal - 6/10 via SW 5.07/10g monofilament to the plantar foot bilateral feet  Pulses: abnormal - 1/4 dorsalis pedis pulse and 1/4Posterior tibial pulse,   Pinprick: Impaired  Proprioception: Impaired  Vibration (128 Hz): Impaired       Assessment:  69 y.o. female with:   Diagnosis Orders   1. Type II diabetes mellitus with peripheral circulatory disorder ([facility])  10159 - [place] DEBRIDEMENT OF NAILS, 6 OR MORE   2. Dermatophytosis of nail  74936 - [place] DEBRIDEMENT OF NAILS, 6 OR MORE   3. PVD (peripheral vascular disease) ([facility])  VL LOWER EXTREMITY ARTERIAL SEGMENTAL PRESSURES W PPG    49076 - [place] DEBRIDEMENT OF NAILS, 6 OR MORE   4. Benign neoplasm of skin of left foot  24186 - [place] DESTRUCTION BENIGN LESIONS UP TO 14   5. Pain in both lower extremities  90227 - [place] DEBRIDEMENT OF NAILS, 6 OR MORE    42545 - [place] DESTRUCTION BENIGN LESIONS UP TO 14           Q7   []Yes  []No                Q8   [x]Yes  []No                     Q9   []Yes    []No    Plan:   Pt was evaluated and examined. Patient was given personalized discharge instructions.   Nails 1-10 were debrided sharply in length and thickness with anipper and , without incident. Diagnosis was discussed with the pt and all of their questions were answered indetail. Proper foot hygiene and care was discussed with the pt. Informed patient on proper diabetic foot care and importance of tight glycemic control. Patient to check feet daily and contact the office with anyquestions/problems/concerns. Other comorbidity noted and will be managed by PCP. The lesion was partially debrided and silver nitratewas applied with an apperature pad under occlusion. The patient will leave in place for 24-48 hours and than remove. The patient tolerated the procedure well and without complication. Advised pt to get PVR studies for further evaluation of left cool distal digits. All labs were reviewed and all imagining including the above findings were reviewed PRIOR to the patients arrival and with the patient today. Previous patient encounter was reviewed. Encounters from the patients other medical providers were reviewed and noted. Time was spent educating the patient and their families/caregivers on proper care of the feet and ankles. All the above diagnosis were addressed at todays visit and all questions were answered.   A total of 25 minutes was spent with this patients encounter which included charting after the patients visit    Pt will follow up in 2months       Electronically signed by Jorge Ku DPM on 7/27/2021 at 1:56 PM  7/27/2021

## 2021-08-30 ENCOUNTER — HOSPITAL ENCOUNTER (OUTPATIENT)
Age: 71
Setting detail: SPECIMEN
Discharge: HOME | End: 2021-08-30
Payer: MEDICARE

## 2021-08-30 ENCOUNTER — OFFICE VISIT (OUTPATIENT)
Dept: PRIMARY CARE CLINIC | Age: 71
End: 2021-08-30
Payer: MEDICARE

## 2021-08-30 VITALS
WEIGHT: 173.4 LBS | RESPIRATION RATE: 13 BRPM | OXYGEN SATURATION: 97 % | HEART RATE: 88 BPM | BODY MASS INDEX: 30.72 KG/M2 | SYSTOLIC BLOOD PRESSURE: 130 MMHG | DIASTOLIC BLOOD PRESSURE: 82 MMHG | HEIGHT: 63 IN

## 2021-08-30 DIAGNOSIS — Z13.29 SCREENING FOR THYROID DISORDER: ICD-10-CM

## 2021-08-30 DIAGNOSIS — Z99.11: ICD-10-CM

## 2021-08-30 DIAGNOSIS — E11.9 TYPE 2 DIABETES MELLITUS WITHOUT COMPLICATION, WITHOUT LONG-TERM CURRENT USE OF INSULIN (HCC): ICD-10-CM

## 2021-08-30 DIAGNOSIS — I26.02 SADDLE EMBOLUS OF PULMONARY ARTERY WITH ACUTE COR PULMONALE, UNSPECIFIED CHRONICITY (HCC): ICD-10-CM

## 2021-08-30 DIAGNOSIS — E44.0 MODERATE MALNUTRITION (HCC): ICD-10-CM

## 2021-08-30 DIAGNOSIS — E78.5 HYPERLIPIDEMIA, UNSPECIFIED HYPERLIPIDEMIA TYPE: ICD-10-CM

## 2021-08-30 DIAGNOSIS — F41.9 ANXIETY: ICD-10-CM

## 2021-08-30 DIAGNOSIS — N17.9 AKI (ACUTE KIDNEY INJURY) (HCC): ICD-10-CM

## 2021-08-30 DIAGNOSIS — Z78.0 POST-MENOPAUSAL: ICD-10-CM

## 2021-08-30 DIAGNOSIS — I46.2 CARDIAC ARREST DUE TO UNDERLYING CARDIAC CONDITION (HCC): ICD-10-CM

## 2021-08-30 DIAGNOSIS — J96.20 ACUTE ON CHRONIC RESPIRATORY FAILURE, UNSPECIFIED WHETHER WITH HYPOXIA OR HYPERCAPNIA (HCC): ICD-10-CM

## 2021-08-30 DIAGNOSIS — E11.65 TYPE 2 DIABETES MELLITUS WITH HYPERGLYCEMIA, WITHOUT LONG-TERM CURRENT USE OF INSULIN (HCC): ICD-10-CM

## 2021-08-30 DIAGNOSIS — D68.9 COAGULOPATHY (HCC): ICD-10-CM

## 2021-08-30 DIAGNOSIS — Z00.00 ENCOUNTER FOR GENERAL ADULT MEDICAL EXAMINATION W/O ABNORMAL FINDINGS: Primary | ICD-10-CM

## 2021-08-30 LAB
ALBUMIN SERPL-MCNC: 4.5 G/DL (ref 3.5–5.2)
ALBUMIN/GLOBULIN RATIO: 1.5 (ref 1–2.5)
ALP BLD-CCNC: 90 U/L (ref 35–104)
ALT SERPL-CCNC: 29 U/L (ref 5–33)
ANION GAP SERPL CALCULATED.3IONS-SCNC: 16 MMOL/L (ref 9–17)
AST SERPL-CCNC: 16 U/L
BILIRUB SERPL-MCNC: 0.36 MG/DL (ref 0.3–1.2)
BUN BLDV-MCNC: 15 MG/DL (ref 8–23)
BUN/CREAT BLD: ABNORMAL (ref 9–20)
CALCIUM SERPL-MCNC: 10 MG/DL (ref 8.6–10.4)
CHLORIDE BLD-SCNC: 98 MMOL/L (ref 98–107)
CO2: 22 MMOL/L (ref 20–31)
CREAT SERPL-MCNC: 0.78 MG/DL (ref 0.5–0.9)
GFR AFRICAN AMERICAN: >60 ML/MIN
GFR NON-AFRICAN AMERICAN: >60 ML/MIN
GFR SERPL CREATININE-BSD FRML MDRD: ABNORMAL ML/MIN/{1.73_M2}
GFR SERPL CREATININE-BSD FRML MDRD: ABNORMAL ML/MIN/{1.73_M2}
GLUCOSE BLD-MCNC: 269 MG/DL (ref 70–99)
HBA1C MFR BLD: 9.4 %
POTASSIUM SERPL-SCNC: 4.2 MMOL/L (ref 3.7–5.3)
SODIUM BLD-SCNC: 136 MMOL/L (ref 135–144)
TOTAL PROTEIN: 7.5 G/DL (ref 6.4–8.3)
TSH SERPL DL<=0.05 MIU/L-ACNC: 2.29 MIU/L (ref 0.3–5)

## 2021-08-30 PROCEDURE — 3046F HEMOGLOBIN A1C LEVEL >9.0%: CPT | Performed by: NURSE PRACTITIONER

## 2021-08-30 PROCEDURE — 3017F COLORECTAL CA SCREEN DOC REV: CPT | Performed by: NURSE PRACTITIONER

## 2021-08-30 PROCEDURE — G0439 PPPS, SUBSEQ VISIT: HCPCS | Performed by: NURSE PRACTITIONER

## 2021-08-30 PROCEDURE — 4040F PNEUMOC VAC/ADMIN/RCVD: CPT | Performed by: NURSE PRACTITIONER

## 2021-08-30 PROCEDURE — 1123F ACP DISCUSS/DSCN MKR DOCD: CPT | Performed by: NURSE PRACTITIONER

## 2021-08-30 PROCEDURE — 83036 HEMOGLOBIN GLYCOSYLATED A1C: CPT | Performed by: NURSE PRACTITIONER

## 2021-08-30 RX ORDER — FUROSEMIDE 40 MG/1
TABLET ORAL
Qty: 30 TABLET | Refills: 0 | Status: SHIPPED | OUTPATIENT
Start: 2021-08-30 | End: 2021-12-06

## 2021-08-30 RX ORDER — LORAZEPAM 0.5 MG/1
0.5 TABLET ORAL 2 TIMES DAILY PRN
Qty: 60 TABLET | Refills: 3 | Status: SHIPPED | OUTPATIENT
Start: 2021-08-30 | End: 2022-03-07 | Stop reason: SDUPTHER

## 2021-08-30 RX ORDER — ATORVASTATIN CALCIUM 20 MG/1
TABLET, FILM COATED ORAL
Qty: 90 TABLET | Refills: 0 | Status: SHIPPED | OUTPATIENT
Start: 2021-08-30 | End: 2021-12-06 | Stop reason: SDUPTHER

## 2021-08-30 RX ORDER — LORAZEPAM 0.5 MG/1
0.5 TABLET ORAL 2 TIMES DAILY PRN
Qty: 60 TABLET | Refills: 3 | Status: CANCELLED | OUTPATIENT
Start: 2021-08-30 | End: 2021-09-29

## 2021-08-30 RX ORDER — LANCETS
EACH MISCELLANEOUS
Qty: 100 EACH | Refills: 0 | Status: SHIPPED | OUTPATIENT
Start: 2021-08-30

## 2021-08-30 SDOH — ECONOMIC STABILITY: FOOD INSECURITY: WITHIN THE PAST 12 MONTHS, YOU WORRIED THAT YOUR FOOD WOULD RUN OUT BEFORE YOU GOT MONEY TO BUY MORE.: NEVER TRUE

## 2021-08-30 SDOH — ECONOMIC STABILITY: FOOD INSECURITY: WITHIN THE PAST 12 MONTHS, THE FOOD YOU BOUGHT JUST DIDN'T LAST AND YOU DIDN'T HAVE MONEY TO GET MORE.: NEVER TRUE

## 2021-08-30 ASSESSMENT — PATIENT HEALTH QUESTIONNAIRE - PHQ9
SUM OF ALL RESPONSES TO PHQ QUESTIONS 1-9: 0
SUM OF ALL RESPONSES TO PHQ QUESTIONS 1-9: 0
1. LITTLE INTEREST OR PLEASURE IN DOING THINGS: 0
SUM OF ALL RESPONSES TO PHQ9 QUESTIONS 1 & 2: 0
2. FEELING DOWN, DEPRESSED OR HOPELESS: 0
SUM OF ALL RESPONSES TO PHQ QUESTIONS 1-9: 0

## 2021-08-30 ASSESSMENT — LIFESTYLE VARIABLES: HOW OFTEN DO YOU HAVE A DRINK CONTAINING ALCOHOL: 0

## 2021-08-30 ASSESSMENT — SOCIAL DETERMINANTS OF HEALTH (SDOH): HOW HARD IS IT FOR YOU TO PAY FOR THE VERY BASICS LIKE FOOD, HOUSING, MEDICAL CARE, AND HEATING?: NOT HARD AT ALL

## 2021-08-30 NOTE — PROGRESS NOTES
Medicare Annual Wellness Visit  Name: Tasia Hinton Date: 2021   MRN: [id number] Sex: Female   Age: 66 y.o. Ethnicity: Non- / Non    : 1950 Race: White (non-)      Mony Us is here for Medicare AWV    Screenings for behavioral, psychosocial and functional/safety risks, and cognitive dysfunction are all negative except as indicated below. These results, as well as other patient data from the [address] form, are documented in Flowsheets linked to this Encounter. No Known Allergies      Prior to Visit Medications    Medication Sig Taking? Authorizing Provider   empagliflozin (JARDIANCE) 10 MG tablet Take 10 mg by mouth daily Yes Historical Provider, MD   Accu-Chek Softclix Lancets MISC USE   TO CHECK GLUCOSE TWICE DAILY Yes RANDY Olson CNP   atorvastatin (LIPITOR) 20 MG tablet Take 1 tablet by mouth once daily Yes RANDY Olson CNP   furosemide (LASIX) 40 MG tablet Take 1 tablet by mouth once daily Yes RANDY Olson CNP   rOPINIRole (REQUIP) 0.5 MG tablet Take 1 tablet by mouth 3 times daily as needed (restless  legs) Indications: Restless Leg Syndrome, usually just needs once a day Yes RANDY Olson CNP   Semaglutide,0.25 or 0.5MG/DOS, (OZEMPIC, 0.25 OR 0.5 MG/DOSE,) 2 MG/1.5ML SOPN Inject 1 mg into the skin once a week Yes RANDY Olson CNP   Handicap Placard MISC by Other route Expires 2025 Yes RANDY Olson CNP   aspirin 81 MG chewable tablet Take 81 mg by mouth daily Yes Historical Provider, MD   acetaminophen (TYLENOL ARTHRITIS PAIN) 650 MG extended release tablet Take 1,300 mg by mouth 3 times daily  Yes Historical Provider, MD   Alcohol Swabs PADS Need to use  twice a day Yes Carlee Mcbride MD   Blood Glucose Monitoring Suppl FLAVIA Glucometer Accu check. Test Blood Sugars 2 Times Daily.  Dx: E 11.22, N 18.3 type II DM Yes Carlee Mcbride MD   blood glucose test strips (ASCENSIA AUTODISC VI;ONE TOUCH ULTRA TEST VI) strip USE STRIP TO CHECK GLUCOSE ONCE DAILY  RANDY Robertson CNP         Past Medical History:   Diagnosis Date    Acute respiratory failure ([address]     Anemia     Blood transfusion    Anxiety     Chronic kidney disease     GERD (gastroesophageal reflux disease)     H/O transfusion 02/2018    Anemia    Hiatal hernia     Hyperlipidemia     Hypertension 1998    on Rx.     Neuropathy     Osteoarthritis     Pulmonary embolism ([address] 2014    on coumadin    RLS (restless legs syndrome)     Type 2 diabetes mellitus without complication ([address] 0270    on metformin    Vocal cord paralysis 2014    [facility]    Wears glasses        Past Surgical History:   Procedure Laterality Date    BREAST REDUCTION SURGERY  '90's    COLONOSCOPY  2016    no polyp    GASTRIC FUNDOPLICATION  11/60/3745    robotic assisted hiatal hernia repair    HAMMER TOE SURGERY Bilateral 2000, 2003    HIP ARTHROPLASTY Bilateral 2001, 2005    [place] LAP,ESOPHAGOGAST FUNDOPLASTY N/A 5/21/2018    XI ROBOTIC HIATAL HERNIA REPAIR WITH NISSEN FUNDOPLICATION LAPAROSCOPIC, ENDOSEALER performed by Elin Killian MD at [facility]  2016    VOCAL CORD AUGMENTATION W/RADIESSE INJ  09/30/2014    vocal cord surgeryi         Family History   Problem Relation Age of Onset    Kidney Disease Mother         Dialysis    Liver Cancer Father     Arthritis Brother     Diabetes Maternal Grandmother     No Known Problems Maternal Grandfather     No Known Problems Paternal Grandmother     No Known Problems Paternal Grandfather     Diabetes Maternal Aunt     Scoliosis Son        CareTeam (Including outside providers/suppliers regularly involved in providing care):   Patient Care Team:  RANDY Robertson CNP as PCP - General (Nurse Practitioner)  RANDY Robertson CNP as PCP - [facility] Empaneled Provider  Mariann Gomez MD as Consulting Physician (Nephrology)  Meet Matos DPM as Consulting Physician (Podiatry)  Sarah Rosas MD as Consulting Physician (Rheumatology)  Shukri Reyes DO as Consulting Physician (Pulmonology)  Radha Corey DPM as Physician (Podiatry)    Wt Readings from Last 3 Encounters:   08/30/21 173 lb 6.4 oz (78.7 kg)   05/18/21 168 lb (76.2 kg)   04/06/21 168 lb (76.2 kg)     Vitals:    08/30/21 1053 08/30/21 1107 08/30/21 1125   BP: (!) 146/96 (!) 142/90 130/82   Pulse: 88     Resp: 13     SpO2: 97%     Weight: 173 lb 6.4 oz (78.7 kg)     Height: 5' 3\" (1.6 m)       Body mass index is 30.72 kg/m². Based upon direct observation of the patient, evaluation of cognition reveals recent and remote memory intact.     General Appearance: alert and oriented to person, place and time, well developed and well- nourished, in no acute distress  Skin: warm and dry, no rash or erythema  Head: normocephalic and atraumatic  Eyes: pupils equal, round, and reactive to light, extraocular eye movements intact, conjunctivae normal  ENT: tympanic membrane, external ear and ear canal normal bilaterally, nose without deformity, nasal mucosa and turbinates normal without polyps  Neck: supple and non-tender without mass, no thyromegaly or thyroid nodules, no cervical lymphadenopathy  Pulmonary/Chest: clear to auscultation bilaterally- no wheezes, rales or rhonchi, normal air movement, no respiratory distress  Cardiovascular: normal rate, regular rhythm, normal S1 and S2, no murmurs, rubs, clicks, or gallops, distal pulses intact, no carotid bruits  Abdomen: soft, non-tender, non-distended, normal bowel sounds, no masses or organomegaly  Extremities: no cyanosis, clubbing or edema  Musculoskeletal: normal range of motion, no joint swelling, deformity or tenderness  Neurologic: reflexes normal and symmetric, no cranial nerve deficit, gait, coordination and speech normal    Patient's complete Health Risk Assessment and screening values have been reviewed and are found in Flowsheets. The following problems were reviewed today and where indicated follow up appointments were made and/or referrals ordered. Positive Risk Factor Screenings with Interventions:            General Health and ACP:  General  In general, how would you say your health is?: Good  In the past 7 days, have you experienced any of the following? New or Increased Pain, New or Increased Fatigue, Loneliness, Social Isolation, Stress or Anger?: None of These  Do you get the social and emotional support that you need?: Yes  Do you have a Living Will?: (!) No  Advance Directives     Power of [address] Will ACP-Advance Directive ACP-Power of     Not on File Not on File Not on File Not on File      General Health Risk Interventions:  · No Living Will: Patient declines ACP discussion/assistance    Health Habits/Nutrition:  Health Habits/Nutrition  Do you exercise for at least 20 minutes 2-3 times per week?: (!) No  Have you lost any weight without trying in the past 3 months?: No  Do you eat only one meal per day?: No  Have you seen the dentist within the past year?: Yes  Body mass index: (!) 30.71  Health Habits/Nutrition Interventions:  · no issues identified      ADL:  ADLs  In the past 7 days, did you need help from others to perform any of the following everyday activities? Eating, dressing, grooming, bathing, toileting, or walking/balance?: None  In the past 7 days, did you need help from others to take care of any of the following?  Laundry, housekeeping, banking/finances, shopping, telephone use, food preparation, transportation, or taking medications?: [employer] Rosas, Laundry, Transportation  ADL Interventions:  · Patient declines any further evaluation/treatment for this issue    Personalized Preventive Plan   Current Health Maintenance Status  Immunization History   Administered Date(s) Administered    Influenza, High Dose (Fluzone 65 yrs and older) 10/15/2018    Influenza, High-dose, Quadv, 65 yrs +, IM (Fluzone) 10/27/2020    Influenza, Quadv, IM, (6 mo and older Fluzone, Flulaval, Fluarix and 3 yrs and older Afluria) 11/11/2015, 09/25/2017    Influenza, Triv, inactivated, subunit, adjuvanted, IM (Fluad 65 yrs and older) 11/11/2019        Health Maintenance   Topic Date Due    COVID-19 Vaccine (1) Never done    DTaP/Tdap/Td vaccine (1 - Tdap) Never done    DEXA (modify frequency per FRAX score)  Never done    Pneumococcal 65+ years Vaccine (1 of 1 - PPSV23) Never done    Diabetic retinal exam  12/11/2018    Lipid screen  03/11/2020    Flu vaccine (1) 09/01/2021    Diabetic microalbuminuria test  09/20/2021    Shingles Vaccine (1 of 2) 08/30/2022 (Originally 9/28/1999)    A1C test (Diabetic or Prediabetic)  11/30/2021    Diabetic foot exam  05/24/2022    Potassium monitoring  08/30/2022    Creatinine monitoring  08/30/2022    Breast cancer screen  08/31/2022    Annual Wellness Visit (AWV)  08/31/2022    Colon cancer screen colonoscopy  03/10/2026    Hepatitis C screen  Completed    Hepatitis A vaccine  Aged Out    Hib vaccine  Aged Out    Meningococcal (ACWY) vaccine  Aged Out     Recommendations for xaitment Due: see orders and patient instructions/AVS.  . Recommended screening schedule for the next 5-10 years is provided to the patient in written form: see Patient Justin Velazquez was seen today for medicare aw. Diagnoses and all orders for this visit:    Encounter for general adult medical examination w/o abnormal findings    Type 2 diabetes mellitus without complication, without long-term current use of insulin (HCC)  -     POCT glycosylated hemoglobin (Hb A1C)  -     Accu-Chek Softclix Lancets MISC; USE   TO CHECK GLUCOSE TWICE DAILY  -     atorvastatin (LIPITOR) 20 MG tablet;  Take 1 tablet by mouth once daily  -     Discontinue: blood glucose test strips (ASCENSIA AUTODISC VI;ONE TOUCH ULTRA TEST VI)

## 2021-08-31 DIAGNOSIS — E11.9 TYPE 2 DIABETES MELLITUS WITHOUT COMPLICATION, WITHOUT LONG-TERM CURRENT USE OF INSULIN (HCC): ICD-10-CM

## 2021-09-01 DIAGNOSIS — E11.9 TYPE 2 DIABETES MELLITUS WITHOUT COMPLICATION, WITHOUT LONG-TERM CURRENT USE OF INSULIN (HCC): ICD-10-CM

## 2021-09-01 NOTE — TELEPHONE ENCOUNTER
Office received communication from [address] stating that since patient is non insulin dependent, Medicare will only cover test strips for patient testing once daily. [address] is asking for a new script. Writer pended script with directions stating patient is testing once daily instead of twice daily.

## 2021-11-14 PROBLEM — A41.9 SEPSIS (HCC): Status: RESOLVED | Noted: 2018-02-17 | Resolved: 2021-11-14

## 2021-11-14 PROBLEM — E44.0 MODERATE MALNUTRITION (HCC): Status: RESOLVED | Noted: 2018-02-19 | Resolved: 2021-11-14

## 2021-12-06 ENCOUNTER — OFFICE VISIT (OUTPATIENT)
Dept: PRIMARY CARE CLINIC | Age: 71
End: 2021-12-06
Payer: MEDICARE

## 2021-12-06 VITALS
HEART RATE: 112 BPM | DIASTOLIC BLOOD PRESSURE: 88 MMHG | SYSTOLIC BLOOD PRESSURE: 138 MMHG | RESPIRATION RATE: 16 BRPM | OXYGEN SATURATION: 96 % | BODY MASS INDEX: 29.58 KG/M2 | WEIGHT: 167 LBS

## 2021-12-06 DIAGNOSIS — N17.9 AKI (ACUTE KIDNEY INJURY) (HCC): ICD-10-CM

## 2021-12-06 DIAGNOSIS — I46.2 CARDIAC ARREST DUE TO UNDERLYING CARDIAC CONDITION (HCC): ICD-10-CM

## 2021-12-06 DIAGNOSIS — I26.02 SADDLE EMBOLUS OF PULMONARY ARTERY WITH ACUTE COR PULMONALE, UNSPECIFIED CHRONICITY (HCC): ICD-10-CM

## 2021-12-06 DIAGNOSIS — J96.20 ACUTE ON CHRONIC RESPIRATORY FAILURE, UNSPECIFIED WHETHER WITH HYPOXIA OR HYPERCAPNIA (HCC): ICD-10-CM

## 2021-12-06 DIAGNOSIS — G25.81 RESTLESS LEG SYNDROME: ICD-10-CM

## 2021-12-06 DIAGNOSIS — E11.65 TYPE 2 DIABETES MELLITUS WITH HYPERGLYCEMIA, WITHOUT LONG-TERM CURRENT USE OF INSULIN (HCC): ICD-10-CM

## 2021-12-06 DIAGNOSIS — E11.9 TYPE 2 DIABETES MELLITUS WITHOUT COMPLICATION, WITHOUT LONG-TERM CURRENT USE OF INSULIN (HCC): Primary | ICD-10-CM

## 2021-12-06 DIAGNOSIS — D68.9 COAGULOPATHY (HCC): ICD-10-CM

## 2021-12-06 LAB
CREATININE URINE POCT: 50
HBA1C MFR BLD: 14 %
MICROALBUMIN/CREAT 24H UR: 30 MG/G{CREAT}
MICROALBUMIN/CREAT UR-RTO: NORMAL

## 2021-12-06 PROCEDURE — 3017F COLORECTAL CA SCREEN DOC REV: CPT | Performed by: NURSE PRACTITIONER

## 2021-12-06 PROCEDURE — 4040F PNEUMOC VAC/ADMIN/RCVD: CPT | Performed by: NURSE PRACTITIONER

## 2021-12-06 PROCEDURE — G8427 DOCREV CUR MEDS BY ELIG CLIN: HCPCS | Performed by: NURSE PRACTITIONER

## 2021-12-06 PROCEDURE — 1123F ACP DISCUSS/DSCN MKR DOCD: CPT | Performed by: NURSE PRACTITIONER

## 2021-12-06 PROCEDURE — 99214 OFFICE O/P EST MOD 30 MIN: CPT | Performed by: NURSE PRACTITIONER

## 2021-12-06 PROCEDURE — G8484 FLU IMMUNIZE NO ADMIN: HCPCS | Performed by: NURSE PRACTITIONER

## 2021-12-06 PROCEDURE — 3046F HEMOGLOBIN A1C LEVEL >9.0%: CPT | Performed by: NURSE PRACTITIONER

## 2021-12-06 PROCEDURE — G8400 PT W/DXA NO RESULTS DOC: HCPCS | Performed by: NURSE PRACTITIONER

## 2021-12-06 PROCEDURE — G8417 CALC BMI ABV UP PARAM F/U: HCPCS | Performed by: NURSE PRACTITIONER

## 2021-12-06 PROCEDURE — 82044 UR ALBUMIN SEMIQUANTITATIVE: CPT | Performed by: NURSE PRACTITIONER

## 2021-12-06 PROCEDURE — 1036F TOBACCO NON-USER: CPT | Performed by: NURSE PRACTITIONER

## 2021-12-06 PROCEDURE — 83036 HEMOGLOBIN GLYCOSYLATED A1C: CPT | Performed by: NURSE PRACTITIONER

## 2021-12-06 PROCEDURE — 1090F PRES/ABSN URINE INCON ASSESS: CPT | Performed by: NURSE PRACTITIONER

## 2021-12-06 PROCEDURE — 2022F DILAT RTA XM EVC RTNOPTHY: CPT | Performed by: NURSE PRACTITIONER

## 2021-12-06 RX ORDER — ROPINIROLE 0.5 MG/1
0.5 TABLET, FILM COATED ORAL 3 TIMES DAILY PRN
Qty: 90 TABLET | Refills: 3 | Status: SHIPPED | OUTPATIENT
Start: 2021-12-06

## 2021-12-06 RX ORDER — ATORVASTATIN CALCIUM 20 MG/1
TABLET, FILM COATED ORAL
Qty: 90 TABLET | Refills: 0 | Status: SHIPPED | OUTPATIENT
Start: 2021-12-06 | End: 2021-12-06 | Stop reason: SDUPTHER

## 2021-12-06 RX ORDER — ROPINIROLE 0.5 MG/1
0.5 TABLET, FILM COATED ORAL 3 TIMES DAILY PRN
Qty: 90 TABLET | Refills: 3 | Status: SHIPPED | OUTPATIENT
Start: 2021-12-06 | End: 2021-12-06 | Stop reason: SDUPTHER

## 2021-12-06 RX ORDER — ATORVASTATIN CALCIUM 20 MG/1
TABLET, FILM COATED ORAL
Qty: 90 TABLET | Refills: 0 | Status: SHIPPED | OUTPATIENT
Start: 2021-12-06 | End: 2022-03-28 | Stop reason: SDUPTHER

## 2021-12-06 RX ORDER — FLUCONAZOLE 150 MG/1
150 TABLET ORAL ONCE
Qty: 1 TABLET | Refills: 1 | Status: SHIPPED | OUTPATIENT
Start: 2021-12-06 | End: 2021-12-06

## 2021-12-06 ASSESSMENT — ENCOUNTER SYMPTOMS
SHORTNESS OF BREATH: 0
ABDOMINAL PAIN: 0
BACK PAIN: 0
COUGH: 0

## 2021-12-06 NOTE — PROGRESS NOTES
[address] PRIMARY CARE  [address] [facility]  [address] [address]  Dept: [phone]  Dept Fax: [phone]    Caty Wallace is a 66 y.o. female who presentstoday for her medical conditions/complaints as noted below. Caty Wallace is c/o of  Chief Complaint   Patient presents with    Diabetes     3 mo f/u           HPI:     Presents for 3 month recheck on chronic conditions with spouse Cabrera  BP elevated, improves with rest  Has lost 6lb since last visit  Has not been working on diet/exercise    Hga1c from 9.4 to 14  Has not been taking any medications  Unable tolerate metformin or jardiance- made her sick  Ozempic makes her feel off balance when she first starts med; however d/cd med because she did not feel like it was helping her  Reviewed it was helping somewhat- willing to resume    Using OTC laxative prn- about 2-3x per week  Keeps her bowels moving regularly    Using apple cider gummy in place of lasix, this is working well  Using ativan nightly prn for anxiety    C/o yeast infection. Discussed related to uncontrolled diabetes  Sent med    Denies any other problems/concerns          Hemoglobin A1C (%)   Date Value   2021 14.0   2021 9.4   2020 12.5             ( goal A1C is < 7)   Microalb/Crt.  Ratio (mcg/mg creat)   Date Value   2019 13     LDL Cholesterol (mg/dL)   Date Value   2019 77     LDL Calculated (mg/dL)   Date Value   2017 74       (goal LDL is <100)   AST (U/L)   Date Value   2021 16     ALT (U/L)   Date Value   2021 29     BUN (mg/dL)   Date Value   2021 15     BP Readings from Last 3 Encounters:   21 138/88   21 130/82   20 124/80          ([id number])    Past Medical History:   Diagnosis Date    Acute respiratory failure (HCC)     Anemia     Blood transfusion    Anxiety     Chronic kidney disease     GERD (gastroesophageal reflux disease)     H/O transfusion 2018 Anemia    Hiatal hernia     Hyperlipidemia     Hypertension 1998    on Rx.     Neuropathy     Osteoarthritis     Pulmonary embolism ([facility] 2014    on coumadin    RLS (restless legs syndrome)     Type 2 diabetes mellitus without complication ([facility] 7157    on metformin    Vocal cord paralysis 2014    vocal cord surgery in [facility]    Wears glasses       Past Surgical History:   Procedure Laterality Date    BREAST REDUCTION SURGERY  '90's    COLONOSCOPY  2016    no polyp    GASTRIC FUNDOPLICATION  32/53/7684    robotic assisted hiatal hernia repair    HAMMER TOE SURGERY Bilateral 2000, 2003    HIP ARTHROPLASTY Bilateral 2001, 2005    [place] LAP,ESOPHAGOGAST FUNDOPLASTY N/A 5/21/2018    XI ROBOTIC HIATAL HERNIA REPAIR WITH NISSEN FUNDOPLICATION LAPAROSCOPIC, ENDOSEALER performed by Sachi Nicholas MD at [facility]  2016    VOCAL CORD AUGMENTATION W/RADIESSE INJ  09/30/2014    vocal cord surgeryi       Family History   Problem Relation Age of Onset    Kidney Disease Mother         Dialysis    Liver Cancer Father     Arthritis Brother     Diabetes Maternal Grandmother     No Known Problems Maternal Grandfather     No Known Problems Paternal Grandmother     No Known Problems Paternal Grandfather     Diabetes Maternal Aunt     Scoliosis Son           Social History     Tobacco Use    Smoking status: Never Smoker    Smokeless tobacco: Never Used   Substance Use Topics    Alcohol use: No      Current Outpatient Medications   Medication Sig Dispense Refill    rOPINIRole (REQUIP) 0.5 MG tablet Take 1 tablet by mouth 3 times daily as needed (restless  legs) Indications: Restless Leg Syndrome, usually just needs once a day 90 tablet 3    atorvastatin (LIPITOR) 20 MG tablet Take 1 tablet by mouth once daily 90 tablet 0    fluconazole (DIFLUCAN) 150 MG tablet Take 1 tablet by mouth once for 1 dose 1 tablet 1    blood glucose test strips (ASCENSIA AUTODISC Negative for congestion. Eyes: Negative for visual disturbance (blurriness, has new glasses. Related to BS levels). Respiratory: Negative for cough and shortness of breath (at baseline). Cardiovascular: Negative for chest pain and palpitations. Gastrointestinal: Negative for abdominal pain. Genitourinary: Negative for dysuria. Musculoskeletal: Positive for arthralgias. Negative for back pain. Neurological: Negative for dizziness, numbness and headaches. Feels off balance at times   Psychiatric/Behavioral: Negative for self-injury, sleep disturbance and suicidal ideas. The patient is not nervous/anxious. Objective:     Physical Exam  Vitals and nursing note reviewed. Constitutional:       Appearance: She is well-developed. HENT:      Head: Normocephalic and atraumatic. Eyes:      Pupils: Pupils are equal, round, and reactive to light. Cardiovascular:      Rate and Rhythm: Normal rate and regular rhythm. Heart sounds: Normal heart sounds. Pulmonary:      Effort: Pulmonary effort is normal.      Breath sounds: Normal breath sounds. Abdominal:      General: Bowel sounds are normal.      Palpations: Abdomen is soft. Tenderness: There is no abdominal tenderness. Musculoskeletal:         General: Normal range of motion. Cervical back: Normal range of motion and neck supple. Skin:     General: Skin is warm and dry. Neurological:      Mental Status: She is alert and oriented to person, place, and time. Psychiatric:         Behavior: Behavior normal.         Thought Content: Thought content normal.         Judgment: Judgment normal.       /88   Pulse 112   Resp 16   Wt 167 lb (75.8 kg)   SpO2 96%   BMI 29.58 kg/m²     Assessment:       Diagnosis Orders   1. Type 2 diabetes mellitus without complication, without long-term current use of insulin (HCC)  POCT glycosylated hemoglobin (Hb A1C)    POCT microalbumin    atorvastatin (LIPITOR) 20 MG tablet   2. Restless leg syndrome  rOPINIRole (REQUIP) 0.5 MG tablet   3. Saddle embolus of pulmonary artery with acute cor pulmonale, unspecified chronicity ([facility]     4. BLANE (acute kidney injury) ([facility]     5. Acute on chronic respiratory failure, unspecified whether with hypoxia or hypercapnia ([facility]     6. Cardiac arrest due to underlying cardiac condition ([facility]     7. Coagulopathy ([facility]     8. Type 2 diabetes mellitus with hyperglycemia, without long-term current use of insulin ([facility]               Plan:      Return in about 3 months (around 3/6/2022) for Diabetes. 1. Chronic conditions- Hga1c uncontrolled at 13. Willing to resume ozempic, will give sample pen to titrate back up to 1mg weekly. Encouraged diet/exercise. Continue current meds. Follow up in 3 months for recheck/earlier if needed. Orders Placed This Encounter   Procedures    POCT glycosylated hemoglobin (Hb A1C)    POCT microalbumin        Orders Placed This Encounter   Medications    rOPINIRole (REQUIP) 0.5 MG tablet     Sig: Take 1 tablet by mouth 3 times daily as needed (restless  legs) Indications: Restless Leg Syndrome, usually just needs once a day     Dispense:  90 tablet     Refill:  3    atorvastatin (LIPITOR) 20 MG tablet     Sig: Take 1 tablet by mouth once daily     Dispense:  90 tablet     Refill:  0    fluconazole (DIFLUCAN) 150 MG tablet     Sig: Take 1 tablet by mouth once for 1 dose     Dispense:  1 tablet     Refill:  1       Patient given educational materials - see patient instructions. Discussed use, benefit, and side effects of prescribed medications. All patientquestions answered. Pt voiced understanding. Reviewed health maintenance. Instructedto continue current medications, diet and exercise. Patient agreed with treatmentplan. Follow up as directed.      Electronicallysigned by RANDY Orta CNP on 12/6/2021 at 11:26 AM

## 2021-12-27 DIAGNOSIS — E11.9 TYPE 2 DIABETES MELLITUS WITHOUT COMPLICATION, WITHOUT LONG-TERM CURRENT USE OF INSULIN (HCC): ICD-10-CM

## 2021-12-27 RX ORDER — BLOOD SUGAR DIAGNOSTIC
STRIP MISCELLANEOUS
Qty: 100 EACH | Refills: 0 | Status: SHIPPED | OUTPATIENT
Start: 2021-12-27 | End: 2022-01-11 | Stop reason: SDUPTHER

## 2021-12-30 RX ORDER — GLUCOSAMINE HCL/CHONDROITIN SU 500-400 MG
CAPSULE ORAL
Qty: 100 STRIP | Refills: 5 | Status: SHIPPED | OUTPATIENT
Start: 2021-12-30 | End: 2022-01-05 | Stop reason: SDUPTHER

## 2021-12-30 NOTE — TELEPHONE ENCOUNTER
Wrong test strips were ordered (One Touch strips are too small for machine)    Last Visit Date: 12/6/2021   Next Visit Date: 3/7/2022

## 2022-01-05 RX ORDER — GLUCOSAMINE HCL/CHONDROITIN SU 500-400 MG
CAPSULE ORAL
Qty: 100 STRIP | Refills: 5 | Status: SHIPPED | OUTPATIENT
Start: 2022-01-05 | End: 2022-01-07 | Stop reason: SDUPTHER

## 2022-01-05 RX ORDER — GLUCOSAMINE HCL/CHONDROITIN SU 500-400 MG
CAPSULE ORAL
Qty: 100 STRIP | Refills: 5 | Status: SHIPPED | OUTPATIENT
Start: 2022-01-05 | End: 2022-01-05 | Stop reason: SDUPTHER

## 2022-01-05 NOTE — TELEPHONE ENCOUNTER
Test strips were sent in on 12/20 but failed to send. Please re send.  is currently at pharmacy and lives in [place].  TY    Last Visit Date: 12/6/2021   Next Visit Date: 3/7/2022

## 2022-01-07 RX ORDER — GLUCOSAMINE HCL/CHONDROITIN SU 500-400 MG
CAPSULE ORAL
Qty: 100 STRIP | Refills: 5 | Status: SHIPPED | OUTPATIENT
Start: 2022-01-07 | End: 2022-01-12 | Stop reason: SDUPTHER

## 2022-01-07 NOTE — TELEPHONE ENCOUNTER
Last Visit Date: 12/6/2021   Next Visit Date: 3/7/2022     Pt  states that he need refill for test strips sent to [facility] in [address] [place] [address].  Medication pended for approval.    Pt  would like call back when mediation is sent. Please advise.

## 2022-01-11 DIAGNOSIS — E11.9 TYPE 2 DIABETES MELLITUS WITHOUT COMPLICATION, WITHOUT LONG-TERM CURRENT USE OF INSULIN (HCC): ICD-10-CM

## 2022-01-11 RX ORDER — BLOOD SUGAR DIAGNOSTIC
STRIP MISCELLANEOUS
Qty: 100 EACH | Refills: 0 | Status: SHIPPED | OUTPATIENT
Start: 2022-01-11 | End: 2022-01-14 | Stop reason: SDUPTHER

## 2022-01-12 RX ORDER — GLUCOSAMINE HCL/CHONDROITIN SU 500-400 MG
CAPSULE ORAL
Qty: 100 STRIP | Refills: 5 | Status: SHIPPED | OUTPATIENT
Start: 2022-01-12 | End: 2022-01-13 | Stop reason: SDUPTHER

## 2022-01-12 NOTE — TELEPHONE ENCOUNTER
Pt  called back stating that the Rx for the Test Strips need to be sent in as once a day for Medicare to cover. States that verbal that was given by writer earlier this week was not accepted and that pharmacy needs written order sent to them.     Medication pended for approval.

## 2022-01-12 NOTE — TELEPHONE ENCOUNTER
Patient's  called in office asking if we sent accucheck test strips to pharmacy and not a verbal order. I advised patient that glucose test strips were sent to [address] [place] [address] yesterday. He said he will check with pharmacy to make sure they are accucheck. He is unsure if we sent right prescription over. Please send accucheck strips to [address] [place] [address].

## 2022-01-13 RX ORDER — GLUCOSAMINE HCL/CHONDROITIN SU 500-400 MG
CAPSULE ORAL
Qty: 100 STRIP | Refills: 5 | Status: SHIPPED | OUTPATIENT
Start: 2022-01-13 | End: 2022-01-14 | Stop reason: SDUPTHER

## 2022-01-13 NOTE — TELEPHONE ENCOUNTER
Pt.  called again stating the [place] pharmacy has not yet received the Blood Glucose monitoring strips RX. Writer called pharmacy and they stated writer could not verbally authorize the RX and it needs to be received electronically or via hard-faxed RX. Please re-send the RX.

## 2022-01-14 DIAGNOSIS — E11.9 TYPE 2 DIABETES MELLITUS WITHOUT COMPLICATION, WITHOUT LONG-TERM CURRENT USE OF INSULIN (HCC): ICD-10-CM

## 2022-01-14 RX ORDER — GLUCOSAMINE HCL/CHONDROITIN SU 500-400 MG
CAPSULE ORAL
Qty: 100 STRIP | Refills: 5 | Status: SHIPPED | OUTPATIENT
Start: 2022-01-14

## 2022-01-14 RX ORDER — BLOOD SUGAR DIAGNOSTIC
STRIP MISCELLANEOUS
Qty: 100 EACH | Refills: 0 | Status: SHIPPED | OUTPATIENT
Start: 2022-01-14 | End: 2022-03-07

## 2022-01-14 NOTE — TELEPHONE ENCOUNTER
Could you please print these Rx they are not receiving them by E-scribe so we will need to fax these [phone]

## 2022-03-07 ENCOUNTER — OFFICE VISIT (OUTPATIENT)
Dept: PRIMARY CARE CLINIC | Age: 72
End: 2022-03-07
Payer: MEDICARE

## 2022-03-07 VITALS
WEIGHT: 168 LBS | DIASTOLIC BLOOD PRESSURE: 86 MMHG | SYSTOLIC BLOOD PRESSURE: 138 MMHG | BODY MASS INDEX: 29.77 KG/M2 | HEART RATE: 101 BPM | HEIGHT: 63 IN | RESPIRATION RATE: 14 BRPM | OXYGEN SATURATION: 95 %

## 2022-03-07 DIAGNOSIS — E11.65 TYPE 2 DIABETES MELLITUS WITH HYPERGLYCEMIA, WITHOUT LONG-TERM CURRENT USE OF INSULIN (HCC): ICD-10-CM

## 2022-03-07 DIAGNOSIS — N17.9 AKI (ACUTE KIDNEY INJURY) (HCC): ICD-10-CM

## 2022-03-07 DIAGNOSIS — I46.2 CARDIAC ARREST DUE TO UNDERLYING CARDIAC CONDITION (HCC): ICD-10-CM

## 2022-03-07 DIAGNOSIS — F41.9 ANXIETY: ICD-10-CM

## 2022-03-07 DIAGNOSIS — I26.02 SADDLE EMBOLUS OF PULMONARY ARTERY WITH ACUTE COR PULMONALE, UNSPECIFIED CHRONICITY (HCC): ICD-10-CM

## 2022-03-07 DIAGNOSIS — D68.9 COAGULOPATHY (HCC): ICD-10-CM

## 2022-03-07 DIAGNOSIS — E11.9 TYPE 2 DIABETES MELLITUS WITHOUT COMPLICATION, WITHOUT LONG-TERM CURRENT USE OF INSULIN (HCC): Primary | ICD-10-CM

## 2022-03-07 DIAGNOSIS — J96.20 ACUTE ON CHRONIC RESPIRATORY FAILURE, UNSPECIFIED WHETHER WITH HYPOXIA OR HYPERCAPNIA (HCC): ICD-10-CM

## 2022-03-07 LAB — HBA1C MFR BLD: 10.2 %

## 2022-03-07 PROCEDURE — 1123F ACP DISCUSS/DSCN MKR DOCD: CPT | Performed by: NURSE PRACTITIONER

## 2022-03-07 PROCEDURE — G8417 CALC BMI ABV UP PARAM F/U: HCPCS | Performed by: NURSE PRACTITIONER

## 2022-03-07 PROCEDURE — 83036 HEMOGLOBIN GLYCOSYLATED A1C: CPT | Performed by: NURSE PRACTITIONER

## 2022-03-07 PROCEDURE — 2022F DILAT RTA XM EVC RTNOPTHY: CPT | Performed by: NURSE PRACTITIONER

## 2022-03-07 PROCEDURE — G8400 PT W/DXA NO RESULTS DOC: HCPCS | Performed by: NURSE PRACTITIONER

## 2022-03-07 PROCEDURE — 3046F HEMOGLOBIN A1C LEVEL >9.0%: CPT | Performed by: NURSE PRACTITIONER

## 2022-03-07 PROCEDURE — 99214 OFFICE O/P EST MOD 30 MIN: CPT | Performed by: NURSE PRACTITIONER

## 2022-03-07 PROCEDURE — G8427 DOCREV CUR MEDS BY ELIG CLIN: HCPCS | Performed by: NURSE PRACTITIONER

## 2022-03-07 PROCEDURE — 1036F TOBACCO NON-USER: CPT | Performed by: NURSE PRACTITIONER

## 2022-03-07 PROCEDURE — 1090F PRES/ABSN URINE INCON ASSESS: CPT | Performed by: NURSE PRACTITIONER

## 2022-03-07 PROCEDURE — 4040F PNEUMOC VAC/ADMIN/RCVD: CPT | Performed by: NURSE PRACTITIONER

## 2022-03-07 PROCEDURE — G8484 FLU IMMUNIZE NO ADMIN: HCPCS | Performed by: NURSE PRACTITIONER

## 2022-03-07 PROCEDURE — 3017F COLORECTAL CA SCREEN DOC REV: CPT | Performed by: NURSE PRACTITIONER

## 2022-03-07 RX ORDER — LORAZEPAM 0.5 MG/1
0.5 TABLET ORAL 2 TIMES DAILY PRN
Qty: 60 TABLET | Refills: 3 | Status: SHIPPED | OUTPATIENT
Start: 2022-03-07 | End: 2022-08-22 | Stop reason: SDUPTHER

## 2022-03-07 RX ORDER — SEMAGLUTIDE 1.34 MG/ML
1 INJECTION, SOLUTION SUBCUTANEOUS WEEKLY
Qty: 3 ML | Refills: 5 | Status: SHIPPED | OUTPATIENT
Start: 2022-03-07

## 2022-03-07 ASSESSMENT — PATIENT HEALTH QUESTIONNAIRE - PHQ9
SUM OF ALL RESPONSES TO PHQ QUESTIONS 1-9: 0
SUM OF ALL RESPONSES TO PHQ9 QUESTIONS 1 & 2: 0
SUM OF ALL RESPONSES TO PHQ QUESTIONS 1-9: 0
SUM OF ALL RESPONSES TO PHQ QUESTIONS 1-9: 0
1. LITTLE INTEREST OR PLEASURE IN DOING THINGS: 0
2. FEELING DOWN, DEPRESSED OR HOPELESS: 0
SUM OF ALL RESPONSES TO PHQ QUESTIONS 1-9: 0

## 2022-03-07 ASSESSMENT — ENCOUNTER SYMPTOMS
BACK PAIN: 0
SHORTNESS OF BREATH: 0
COUGH: 0
ABDOMINAL PAIN: 0

## 2022-03-07 NOTE — PROGRESS NOTES
[address] PRIMARY CARE  [address] [facility] 15[address]  [address] [address]  Dept: [phone]  Dept Fax: [phone]    Prashant Arias is a 66 y.o. female who presentstoday for her medical conditions/complaints as noted below. Prashant Arias is c/o of  Chief Complaint   Patient presents with    Diabetes    3 Month Follow-Up    Discuss Labs         HPI:     Presents with  for 3 month recheck on chronic conditions  BP well controlled  Weight is stable    Hga1c from 14 to 10.2  Reviewed that this improved but still too high  Has been getting FBS 190s, improved from 300s  Getting weekly injections of ozempic at 0.5mg  Denies any side effects, will increase to 1mg dose  Increase in pain to her feet- reviewed could be due to improving neuropathy    Anxiety- Well controlled with use of ativan  Helps her to sleep well  Denies any side effects with use of med    Denies any other problems/concerns      Hemoglobin A1C (%)   Date Value   2022 10.2   2021 14.0   2021 9.4             ( goal A1C is < 7)   Microalb/Crt. Ratio (mcg/mg creat)   Date Value   2019 13     LDL Cholesterol (mg/dL)   Date Value   2019 77     LDL Calculated (mg/dL)   Date Value   2017 74       (goal LDL is <100)   AST (U/L)   Date Value   2021 16     ALT (U/L)   Date Value   2021 29     BUN (mg/dL)   Date Value   2021 15     BP Readings from Last 3 Encounters:   22 138/86   21 138/88   21 130/82          ([id number])    Past Medical History:   Diagnosis Date    Acute respiratory failure (HCC)     Anemia     Blood transfusion    Anxiety     Chronic kidney disease     GERD (gastroesophageal reflux disease)     H/O transfusion 2018    Anemia    Hiatal hernia     Hyperlipidemia     Hypertension     on Rx.     Neuropathy     Osteoarthritis     Pulmonary embolism ([address]     on coumadin    RLS (restless legs syndrome)     Type 2 diabetes mellitus without complication [facility] 5314    on metformin    Vocal cord paralysis 2014    [facility]ocal [facility] in [facility]    Wears glasses       Past Surgical History:   Procedure Laterality Date    BREAST REDUCTION SURGERY  '90's    COLONOSCOPY  2016    no polyp    GASTRIC FUNDOPLICATION  02/92/1354    robotic assisted hiatal hernia repair    HAMMER TOE SURGERY Bilateral 2000, 2003    HIP ARTHROPLASTY Bilateral 2001, 2005    [place] LAP,ESOPHAGOGAST FUNDOPLASTY N/A 5/21/2018    XI ROBOTIC HIATAL HERNIA REPAIR WITH NISSEN FUNDOPLICATION LAPAROSCOPIC, ENDOSEALER performed by Abraham Bone MD at [address]  2016    VOCAL CORD AUGMENTATION W/RADIESSE INJ  09/30/2014    vocal cord surgeryi       Family History   Problem Relation Age of Onset    Kidney Disease Mother         Dialysis    Liver Cancer Father     Arthritis Brother     Diabetes Maternal Grandmother     No Known Problems Maternal Grandfather     No Known Problems Paternal Grandmother     No Known Problems Paternal Grandfather     Diabetes Maternal Aunt     Scoliosis Son           Social History     Tobacco Use    Smoking status: Never Smoker    Smokeless tobacco: Never Used   Substance Use Topics    Alcohol use: No      Current Outpatient Medications   Medication Sig Dispense Refill    Multiple Vitamins-Minerals (MULTI-VITAMIN GUMMIES PO) Take by mouth      ZINC PO Take by mouth      LORazepam (ATIVAN) 0.5 MG tablet Take 1 tablet by mouth 2 times daily as needed for Anxiety for up to 30 days. 60 tablet 3    Semaglutide, 1 MG/DOSE, (OZEMPIC, 1 MG/DOSE,) 4 MG/3ML SOPN Inject 1 mg into the skin once a week 3 mL 5    blood glucose monitor strips Accucheck Cortney plus testing strips. Test one times a day & as needed for symptoms of irregular blood glucose. Dispense sufficient amount for indicated testing frequency plus additional to accommodate PRN testing needs.   Please fill with Accucheck testing strips. 100 strip 5    rOPINIRole (REQUIP) 0.5 MG tablet Take 1 tablet by mouth 3 times daily as needed (restless  legs) Indications: Restless Leg Syndrome, usually just needs once a day 90 tablet 3    atorvastatin (LIPITOR) 20 MG tablet Take 1 tablet by mouth once daily 90 tablet 0    empagliflozin (JARDIANCE) 10 MG tablet Take 10 mg by mouth daily      Accu-Chek Softclix Lancets MISC USE   TO CHECK GLUCOSE TWICE DAILY 100 each 0    Handicap Placard MISC by Other route Expires 2/2025 1 each 0    aspirin 81 MG chewable tablet Take 81 mg by mouth daily      acetaminophen (TYLENOL ARTHRITIS PAIN) 650 MG extended release tablet Take 1,300 mg by mouth 3 times daily       Alcohol Swabs PADS Need to use  twice a day 100 each 3    Blood Glucose Monitoring Suppl FLAVIA Glucometer Accu check. Test Blood Sugars 2 Times Daily. Dx: E 11.22, N 18.3 type II DM 1 Device 0     No current facility-administered medications for this visit.      No Known Allergies    Health Maintenance   Topic Date Due    DEXA (modify frequency per FRAX score)  Never done    Pneumococcal 65+ years Vaccine (1 of 1 - PPSV23) Never done    Diabetic retinal exam  12/11/2018    Lipid screen  03/11/2020    DTaP/Tdap/Td vaccine (1 - Tdap) 03/28/2022 (Originally 9/28/1969)    Shingles Vaccine (1 of 2) 08/30/2022 (Originally 9/28/1999)    Flu vaccine (1) 12/06/2022 (Originally 9/1/2021)    COVID-19 Vaccine (1) 12/06/2022 (Originally 9/28/1955)    Diabetic foot exam  05/24/2022    A1C test (Diabetic or Prediabetic)  06/07/2022    Depression Screen  08/30/2022    Potassium monitoring  08/30/2022    Creatinine monitoring  08/30/2022    Breast cancer screen  08/31/2022    Annual Wellness Visit (AWV)  08/31/2022    Diabetic microalbuminuria test  12/06/2022    Colorectal Cancer Screen  03/10/2026    Hepatitis C screen  Completed    Hepatitis A vaccine  Aged Out    Hib vaccine  Aged Out    Meningococcal (ACWY) vaccine Aged Out       Subjective:      Review of Systems   Constitutional: Negative for chills, fatigue and fever. HENT: Negative for congestion. Eyes: Negative for visual disturbance. Respiratory: Negative for cough and shortness of breath. Cardiovascular: Negative for chest pain and palpitations. Gastrointestinal: Negative for abdominal pain. Genitourinary: Negative for dysuria. Musculoskeletal: Positive for arthralgias. Negative for back pain. Using cane   Neurological: Negative for dizziness, numbness and headaches. Psychiatric/Behavioral: Negative for self-injury, sleep disturbance and suicidal ideas. The patient is not nervous/anxious. Objective:     Physical Exam  Vitals and nursing note reviewed. Constitutional:       Appearance: She is well-developed. HENT:      Head: Normocephalic and atraumatic. Eyes:      Pupils: Pupils are equal, round, and reactive to light. Cardiovascular:      Rate and Rhythm: Normal rate and regular rhythm. Heart sounds: Normal heart sounds. Pulmonary:      Effort: Pulmonary effort is normal.      Breath sounds: Normal breath sounds. Abdominal:      General: Bowel sounds are normal.      Palpations: Abdomen is soft. Tenderness: There is no abdominal tenderness. Musculoskeletal:         General: Normal range of motion. Cervical back: Normal range of motion and neck supple. Skin:     General: Skin is warm and dry. Neurological:      Mental Status: She is alert and oriented to person, place, and time. Psychiatric:         Behavior: Behavior normal.         Thought Content: Thought content normal.         Judgment: Judgment normal.       /86   Pulse 101   Resp 14   Ht 5' 3\" (1.6 m)   Wt 168 lb (76.2 kg)   SpO2 95%   Breastfeeding No   BMI 29.76 kg/m²     Assessment:       Diagnosis Orders   1.  Type 2 diabetes mellitus without complication, without long-term current use of insulin ([facility])  POCT glycosylated hemoglobin (Hb A1C)   2. Anxiety  LORazepam (ATIVAN) 0.5 MG tablet   3. Type 2 diabetes mellitus with hyperglycemia, without long-term current use of insulin (HCC)     4. Coagulopathy ([facility]     5. Saddle embolus of pulmonary artery with acute cor pulmonale, unspecified chronicity ([facility]     6. BLANE (acute kidney injury) ([facility]     7. Acute on chronic respiratory failure, unspecified whether with hypoxia or hypercapnia ([facility]     8. Cardiac arrest due to underlying cardiac condition [facility])               Plan:      Return in about 3 months (around 6/7/2022) for Diabetes. 1. DM- Hga1c improved to 10.2 but still too high. Increase ozempic to 1mg weekly. Sample given in office. Follow up in 3 months for recheck/repeat Hga1c.  2. Anxiety- Stable. Continue ativan at current dose. Follow up in 3 months for recheck. 3. Chronic conditions- Stable. Continue current meds. Follow up in 3 months for recheck. Orders Placed This Encounter   Procedures    POCT glycosylated hemoglobin (Hb A1C)        Orders Placed This Encounter   Medications    LORazepam (ATIVAN) 0.5 MG tablet     Sig: Take 1 tablet by mouth 2 times daily as needed for Anxiety for up to 30 days. Dispense:  60 tablet     Refill:  3    Semaglutide, 1 MG/DOSE, (OZEMPIC, 1 MG/DOSE,) 4 MG/3ML SOPN     Sig: Inject 1 mg into the skin once a week     Dispense:  3 mL     Refill:  5       Patient given educational materials - see patient instructions. Discussed use, benefit, and side effects of prescribed medications. All patientquestions answered. Pt voiced understanding. Reviewed health maintenance. Instructedto continue current medications, diet and exercise. Patient agreed with treatmentplan. Follow up as directed.      Electronicallysigned by RANDY Machuca CNP on 3/7/2022 at 11:39 AM

## 2022-03-28 DIAGNOSIS — E11.9 TYPE 2 DIABETES MELLITUS WITHOUT COMPLICATION, WITHOUT LONG-TERM CURRENT USE OF INSULIN (HCC): ICD-10-CM

## 2022-03-28 PROCEDURE — 3046F HEMOGLOBIN A1C LEVEL >9.0%: CPT | Performed by: NURSE PRACTITIONER

## 2022-03-28 RX ORDER — ATORVASTATIN CALCIUM 20 MG/1
TABLET, FILM COATED ORAL
Qty: 90 TABLET | Refills: 0 | Status: SHIPPED | OUTPATIENT
Start: 2022-03-28

## 2022-06-15 ENCOUNTER — OFFICE VISIT (OUTPATIENT)
Dept: PRIMARY CARE CLINIC | Age: 72
End: 2022-06-15
Payer: MEDICARE

## 2022-06-15 VITALS
RESPIRATION RATE: 16 BRPM | DIASTOLIC BLOOD PRESSURE: 80 MMHG | HEART RATE: 99 BPM | OXYGEN SATURATION: 95 % | WEIGHT: 171.2 LBS | SYSTOLIC BLOOD PRESSURE: 124 MMHG | BODY MASS INDEX: 30.33 KG/M2 | HEIGHT: 63 IN

## 2022-06-15 DIAGNOSIS — R06.02 SOB (SHORTNESS OF BREATH): ICD-10-CM

## 2022-06-15 DIAGNOSIS — R13.10 DYSPHAGIA, UNSPECIFIED TYPE: ICD-10-CM

## 2022-06-15 DIAGNOSIS — N17.9 AKI (ACUTE KIDNEY INJURY) (HCC): ICD-10-CM

## 2022-06-15 DIAGNOSIS — E11.9 TYPE 2 DIABETES MELLITUS WITHOUT COMPLICATION, WITHOUT LONG-TERM CURRENT USE OF INSULIN (HCC): Primary | ICD-10-CM

## 2022-06-15 LAB — HBA1C MFR BLD: 7 %

## 2022-06-15 PROCEDURE — G8400 PT W/DXA NO RESULTS DOC: HCPCS | Performed by: NURSE PRACTITIONER

## 2022-06-15 PROCEDURE — 1090F PRES/ABSN URINE INCON ASSESS: CPT | Performed by: NURSE PRACTITIONER

## 2022-06-15 PROCEDURE — G8417 CALC BMI ABV UP PARAM F/U: HCPCS | Performed by: NURSE PRACTITIONER

## 2022-06-15 PROCEDURE — G8427 DOCREV CUR MEDS BY ELIG CLIN: HCPCS | Performed by: NURSE PRACTITIONER

## 2022-06-15 PROCEDURE — 1036F TOBACCO NON-USER: CPT | Performed by: NURSE PRACTITIONER

## 2022-06-15 PROCEDURE — 83036 HEMOGLOBIN GLYCOSYLATED A1C: CPT | Performed by: NURSE PRACTITIONER

## 2022-06-15 PROCEDURE — 3051F HG A1C>EQUAL 7.0%<8.0%: CPT | Performed by: NURSE PRACTITIONER

## 2022-06-15 PROCEDURE — 1123F ACP DISCUSS/DSCN MKR DOCD: CPT | Performed by: NURSE PRACTITIONER

## 2022-06-15 PROCEDURE — 2022F DILAT RTA XM EVC RTNOPTHY: CPT | Performed by: NURSE PRACTITIONER

## 2022-06-15 PROCEDURE — 99214 OFFICE O/P EST MOD 30 MIN: CPT | Performed by: NURSE PRACTITIONER

## 2022-06-15 PROCEDURE — 3017F COLORECTAL CA SCREEN DOC REV: CPT | Performed by: NURSE PRACTITIONER

## 2022-06-15 RX ORDER — FUROSEMIDE 40 MG/1
TABLET ORAL
Qty: 30 TABLET | Refills: 0 | Status: SHIPPED | OUTPATIENT
Start: 2022-06-15

## 2022-06-15 ASSESSMENT — ENCOUNTER SYMPTOMS
COUGH: 0
BACK PAIN: 0
SHORTNESS OF BREATH: 0
ABDOMINAL PAIN: 0

## 2022-06-15 NOTE — PROGRESS NOTES
 Vocal cord paralysis 2014    [facility]ocal cord surgery in [facility]    Wears glasses       Past Surgical History:   Procedure Laterality Date    BREAST REDUCTION SURGERY  '90's    COLONOSCOPY  2016    no polyp    GASTRIC FUNDOPLICATION  76/31/7769    robotic assisted hiatal hernia repair    HAMMER TOE SURGERY Bilateral 2000, 2003    HIP ARTHROPLASTY Bilateral 2001, 2005    [place] LAP,ESOPHAGOGAST FUNDOPLASTY N/A 5/21/2018    XI ROBOTIC HIATAL HERNIA REPAIR WITH NISSEN FUNDOPLICATION LAPAROSCOPIC, ENDOSEALER performed by Stephen Garcia MD at [address]  2016    VOCAL CORD AUGMENTATION W/RADIESSE INJ  09/30/2014    vocal cord surgeryi       Family History   Problem Relation Age of Onset    Kidney Disease Mother         Dialysis    Liver Cancer Father     Arthritis Brother     Diabetes Maternal Grandmother     No Known Problems Maternal Grandfather     No Known Problems Paternal Grandmother     No Known Problems Paternal Grandfather     Diabetes Maternal Aunt     Scoliosis Son           Social History     Tobacco Use    Smoking status: Never Smoker    Smokeless tobacco: Never Used   Substance Use Topics    Alcohol use: No      Current Outpatient Medications   Medication Sig Dispense Refill    furosemide (LASIX) 40 MG tablet Take 1 tablet by mouth once daily 30 tablet 0    atorvastatin (LIPITOR) 20 MG tablet Take 1 tablet by mouth once daily 90 tablet 0    Multiple Vitamins-Minerals (MULTI-VITAMIN GUMMIES PO) Take by mouth      ZINC PO Take by mouth      Semaglutide, 1 MG/DOSE, (OZEMPIC, 1 MG/DOSE,) 4 MG/3ML SOPN Inject 1 mg into the skin once a week 3 mL 5    blood glucose monitor strips Accucheck Cortney plus testing strips. Test one times a day & as needed for symptoms of irregular blood glucose. Dispense sufficient amount for indicated testing frequency plus additional to accommodate PRN testing needs. Please fill with Accucheck testing strips.  100 strip 5    rOPINIRole (REQUIP) 0.5 MG tablet Take 1 tablet by mouth 3 times daily as needed (restless  legs) Indications: Restless Leg Syndrome, usually just needs once a day 90 tablet 3    empagliflozin (JARDIANCE) 10 MG tablet Take 10 mg by mouth daily      Accu-Chek Softclix Lancets MISC USE   TO CHECK GLUCOSE TWICE DAILY 100 each 0    Handicap Placard MISC by Other route Expires 2/2025 1 each 0    aspirin 81 MG chewable tablet Take 81 mg by mouth daily      acetaminophen (TYLENOL ARTHRITIS PAIN) 650 MG extended release tablet Take 1,300 mg by mouth 3 times daily       Alcohol Swabs PADS Need to use  twice a day 100 each 3    Blood Glucose Monitoring Suppl FLAVIA Glucometer Accu check. Test Blood Sugars 2 Times Daily. Dx: E 11.22, N 18.3 type II DM 1 Device 0     No current facility-administered medications for this visit. No Known Allergies    Health Maintenance   Topic Date Due    Pneumococcal 65+ years Vaccine (1 - PCV) Never done    DEXA (modify frequency per FRAX score)  Never done    Diabetic retinal exam  12/11/2018    Lipids  01/22/2022    Diabetic foot exam  05/24/2022    DTaP/Tdap/Td vaccine (1 - Tdap) 07/06/2022 (Originally 9/28/1969)    Shingles vaccine (1 of 2) 08/30/2022 (Originally 9/28/1999)    Flu vaccine (Season Ended) 12/06/2022 (Originally 9/1/2022)    COVID-19 Vaccine (1) 12/06/2022 (Originally 9/28/1955)    Breast cancer screen  08/31/2022    Annual Wellness Visit (AWV)  08/31/2022    Diabetic microalbuminuria test  12/06/2022    Depression Screen  03/07/2023    A1C test (Diabetic or Prediabetic)  06/15/2023    Colorectal Cancer Screen  03/10/2026    Hepatitis C screen  Completed    Hepatitis A vaccine  Aged Out    Hib vaccine  Aged Out    Meningococcal (ACWY) vaccine  Aged Out       Subjective:      Review of Systems   Constitutional: Negative for chills, fatigue and fever. HENT: Negative for congestion. Eyes: Negative for visual disturbance.    Respiratory: Negative for cough and shortness of breath. Cardiovascular: Negative for chest pain and palpitations. Gastrointestinal: Negative for abdominal pain. Genitourinary: Negative for dysuria. Musculoskeletal: Negative for back pain. Neurological: Negative for dizziness, numbness and headaches. Psychiatric/Behavioral: Negative for self-injury, sleep disturbance and suicidal ideas. The patient is not nervous/anxious. Objective:     Physical Exam  Vitals and nursing note reviewed. Constitutional:       Appearance: She is well-developed. HENT:      Head: Normocephalic and atraumatic. Eyes:      Pupils: Pupils are equal, round, and reactive to light. Cardiovascular:      Rate and Rhythm: Normal rate and regular rhythm. Heart sounds: Normal heart sounds. Pulmonary:      Effort: Pulmonary effort is normal.      Breath sounds: Normal breath sounds. Abdominal:      General: Bowel sounds are normal.      Palpations: Abdomen is soft. Tenderness: There is no abdominal tenderness. Musculoskeletal:         General: Normal range of motion. Cervical back: Normal range of motion and neck supple. Skin:     General: Skin is warm and dry. Neurological:      Mental Status: She is alert and oriented to person, place, and time. Psychiatric:         Behavior: Behavior normal.         Thought Content: Thought content normal.         Judgment: Judgment normal.       /80 (Site: Left Upper Arm, Position: Sitting, Cuff Size: Medium Adult)   Pulse 99   Resp 16   Ht 5' 3\" (1.6 m)   Wt 171 lb 3.2 oz (77.7 kg)   SpO2 95%   BMI 30.33 kg/m²     Assessment:       Diagnosis Orders   1. Type 2 diabetes mellitus without complication, without long-term current use of insulin ([facility])  POCT glycosylated hemoglobin (Hb A1C)   2. BLANE (acute kidney injury) ([facility]  furosemide (LASIX) 40 MG tablet             Plan:      Return in about 6 months (around 12/15/2022) for AWV. 1. DM- Hga1c improved to 7. Continue medication 1mg every other week. Follow up in six months for recheck/annual exam.  2. Chronic conditions- Stable. Continue meds at current dose. Follow up in six months for recheck/AWV. Orders Placed This Encounter   Procedures    POCT glycosylated hemoglobin (Hb A1C)        Orders Placed This Encounter   Medications    furosemide (LASIX) 40 MG tablet     Sig: Take 1 tablet by mouth once daily     Dispense:  30 tablet     Refill:  0       Patient given educational materials - see patient instructions. Discussed use, benefit, and side effects of prescribed medications. All patientquestions answered. Pt voiced understanding. Reviewed health maintenance. Instructedto continue current medications, diet and exercise. Patient agreed with treatmentplan. Follow up as directed.      Electronicallysigned by RANDY Alfaro CNP on 6/15/2022 at 11:36 AM

## 2022-08-22 DIAGNOSIS — F41.9 ANXIETY: ICD-10-CM

## 2022-08-22 RX ORDER — LORAZEPAM 0.5 MG/1
0.5 TABLET ORAL 2 TIMES DAILY PRN
Qty: 60 TABLET | Refills: 3 | Status: CANCELLED | OUTPATIENT
Start: 2022-08-22 | End: 2022-09-21

## 2022-08-22 RX ORDER — LORAZEPAM 0.5 MG/1
0.5 TABLET ORAL 2 TIMES DAILY PRN
Qty: 60 TABLET | Refills: 3 | Status: SHIPPED | OUTPATIENT
Start: 2022-08-22 | End: 2022-09-21

## 2022-08-22 RX ORDER — LORAZEPAM 0.5 MG/1
0.5 TABLET ORAL EVERY 6 HOURS PRN
Status: CANCELLED | OUTPATIENT
Start: 2022-08-22 | End: 2022-09-21

## 2022-12-15 ENCOUNTER — OFFICE VISIT (OUTPATIENT)
Dept: PRIMARY CARE CLINIC | Age: 72
End: 2022-12-15
Payer: MEDICARE

## 2022-12-15 ENCOUNTER — HOSPITAL ENCOUNTER (OUTPATIENT)
Age: 72
Setting detail: SPECIMEN
Discharge: HOME | End: 2022-12-15

## 2022-12-15 VITALS
OXYGEN SATURATION: 96 % | BODY MASS INDEX: 30.69 KG/M2 | SYSTOLIC BLOOD PRESSURE: 136 MMHG | DIASTOLIC BLOOD PRESSURE: 84 MMHG | HEART RATE: 110 BPM | RESPIRATION RATE: 13 BRPM | WEIGHT: 173.2 LBS | HEIGHT: 63 IN

## 2022-12-15 DIAGNOSIS — E11.9 TYPE 2 DIABETES MELLITUS WITHOUT COMPLICATION, WITHOUT LONG-TERM CURRENT USE OF INSULIN (HCC): ICD-10-CM

## 2022-12-15 DIAGNOSIS — F41.9 ANXIETY: ICD-10-CM

## 2022-12-15 DIAGNOSIS — R25.2 MUSCLE CRAMPING: ICD-10-CM

## 2022-12-15 DIAGNOSIS — N17.9 AKI (ACUTE KIDNEY INJURY) (HCC): ICD-10-CM

## 2022-12-15 DIAGNOSIS — R53.83 OTHER FATIGUE: ICD-10-CM

## 2022-12-15 DIAGNOSIS — E78.5 HYPERLIPIDEMIA, UNSPECIFIED HYPERLIPIDEMIA TYPE: ICD-10-CM

## 2022-12-15 DIAGNOSIS — Z00.00 ENCOUNTER FOR GENERAL ADULT MEDICAL EXAMINATION W/O ABNORMAL FINDINGS: Primary | ICD-10-CM

## 2022-12-15 LAB
ALBUMIN SERPL-MCNC: 4.4 G/DL (ref 3.5–5.2)
ALBUMIN/GLOBULIN RATIO: 1.2 (ref 1–2.5)
ALP BLD-CCNC: 89 U/L (ref 35–104)
ALT SERPL-CCNC: 27 U/L (ref 5–33)
ANION GAP SERPL CALCULATED.3IONS-SCNC: 17 MMOL/L (ref 9–17)
AST SERPL-CCNC: 24 U/L
BILIRUB SERPL-MCNC: 0.3 MG/DL (ref 0.3–1.2)
BUN BLDV-MCNC: 15 MG/DL (ref 8–23)
CALCIUM SERPL-MCNC: 10.1 MG/DL (ref 8.6–10.4)
CHLORIDE BLD-SCNC: 99 MMOL/L (ref 98–107)
CHOLESTEROL/HDL RATIO: 5.5
CHOLESTEROL: 347 MG/DL
CO2: 23 MMOL/L (ref 20–31)
CREAT SERPL-MCNC: 1.01 MG/DL (ref 0.5–0.9)
GFR SERPL CREATININE-BSD FRML MDRD: 59 ML/MIN/1.73M2
GLUCOSE BLD-MCNC: 174 MG/DL (ref 70–99)
HCT VFR BLD CALC: 50.7 % (ref 36.3–47.1)
HDLC SERPL-MCNC: 63 MG/DL
HEMOGLOBIN: 16.6 G/DL (ref 11.9–15.1)
LDL CHOLESTEROL DIRECT: 230 MG/DL
LDL CHOLESTEROL: ABNORMAL MG/DL (ref 0–130)
MAGNESIUM: 1.6 MG/DL (ref 1.6–2.6)
MCH RBC QN AUTO: 31.7 PG (ref 25.2–33.5)
MCHC RBC AUTO-ENTMCNC: 32.7 G/DL (ref 28.4–34.8)
MCV RBC AUTO: 96.8 FL (ref 82.6–102.9)
NRBC AUTOMATED: 0 PER 100 WBC
PDW BLD-RTO: 12.6 % (ref 11.8–14.4)
PLATELET # BLD: 370 K/UL (ref 138–453)
PMV BLD AUTO: 11.2 FL (ref 8.1–13.5)
POTASSIUM SERPL-SCNC: 4 MMOL/L (ref 3.7–5.3)
RBC # BLD: 5.24 M/UL (ref 3.95–5.11)
SODIUM BLD-SCNC: 139 MMOL/L (ref 135–144)
TOTAL PROTEIN: 8.1 G/DL (ref 6.4–8.3)
TRIGL SERPL-MCNC: 422 MG/DL
TSH SERPL DL<=0.05 MIU/L-ACNC: 2.11 UIU/ML (ref 0.3–5)
WBC # BLD: 11.7 K/UL (ref 3.5–11.3)

## 2022-12-15 PROCEDURE — 3074F SYST BP LT 130 MM HG: CPT | Performed by: NURSE PRACTITIONER

## 2022-12-15 PROCEDURE — 3017F COLORECTAL CA SCREEN DOC REV: CPT | Performed by: NURSE PRACTITIONER

## 2022-12-15 PROCEDURE — 3051F HG A1C>EQUAL 7.0%<8.0%: CPT | Performed by: NURSE PRACTITIONER

## 2022-12-15 PROCEDURE — G8484 FLU IMMUNIZE NO ADMIN: HCPCS | Performed by: NURSE PRACTITIONER

## 2022-12-15 PROCEDURE — G0439 PPPS, SUBSEQ VISIT: HCPCS | Performed by: NURSE PRACTITIONER

## 2022-12-15 PROCEDURE — 83036 HEMOGLOBIN GLYCOSYLATED A1C: CPT | Performed by: NURSE PRACTITIONER

## 2022-12-15 PROCEDURE — 1123F ACP DISCUSS/DSCN MKR DOCD: CPT | Performed by: NURSE PRACTITIONER

## 2022-12-15 PROCEDURE — 3078F DIAST BP <80 MM HG: CPT | Performed by: NURSE PRACTITIONER

## 2022-12-15 RX ORDER — FUROSEMIDE 40 MG/1
TABLET ORAL
Qty: 30 TABLET | Refills: 0 | Status: SHIPPED | OUTPATIENT
Start: 2022-12-15

## 2022-12-15 RX ORDER — TIRZEPATIDE 2.5 MG/.5ML
2.5 INJECTION, SOLUTION SUBCUTANEOUS WEEKLY
Qty: 2 ML | Refills: 0 | Status: SHIPPED | OUTPATIENT
Start: 2022-12-15

## 2022-12-15 RX ORDER — GLUCOSAMINE HCL/CHONDROITIN SU 500-400 MG
CAPSULE ORAL
Qty: 100 STRIP | Refills: 5 | Status: SHIPPED | OUTPATIENT
Start: 2022-12-15

## 2022-12-15 RX ORDER — LORAZEPAM 0.5 MG/1
0.5 TABLET ORAL 2 TIMES DAILY PRN
Qty: 180 TABLET | Refills: 1 | Status: SHIPPED | OUTPATIENT
Start: 2022-12-15 | End: 2023-01-14

## 2022-12-15 RX ORDER — GLUCOSAMINE HCL/CHONDROITIN SU 500-400 MG
CAPSULE ORAL
Qty: 100 STRIP | Refills: 5 | Status: SHIPPED | OUTPATIENT
Start: 2022-12-15 | End: 2022-12-15 | Stop reason: SDUPTHER

## 2022-12-15 RX ORDER — LANCETS
EACH MISCELLANEOUS
Qty: 100 EACH | Refills: 0 | Status: SHIPPED | OUTPATIENT
Start: 2022-12-15

## 2022-12-15 RX ORDER — LORAZEPAM 0.5 MG/1
0.5 TABLET ORAL 2 TIMES DAILY PRN
Qty: 60 TABLET | Refills: 3 | Status: CANCELLED | OUTPATIENT
Start: 2022-12-15 | End: 2023-01-14

## 2022-12-15 SDOH — ECONOMIC STABILITY: FOOD INSECURITY: WITHIN THE PAST 12 MONTHS, YOU WORRIED THAT YOUR FOOD WOULD RUN OUT BEFORE YOU GOT MONEY TO BUY MORE.: NEVER TRUE

## 2022-12-15 SDOH — ECONOMIC STABILITY: FOOD INSECURITY: WITHIN THE PAST 12 MONTHS, THE FOOD YOU BOUGHT JUST DIDN'T LAST AND YOU DIDN'T HAVE MONEY TO GET MORE.: NEVER TRUE

## 2022-12-15 ASSESSMENT — PATIENT HEALTH QUESTIONNAIRE - PHQ9
SUM OF ALL RESPONSES TO PHQ QUESTIONS 1-9: 0
2. FEELING DOWN, DEPRESSED OR HOPELESS: 0
SUM OF ALL RESPONSES TO PHQ QUESTIONS 1-9: 0
SUM OF ALL RESPONSES TO PHQ9 QUESTIONS 1 & 2: 0
1. LITTLE INTEREST OR PLEASURE IN DOING THINGS: 0

## 2022-12-15 ASSESSMENT — SOCIAL DETERMINANTS OF HEALTH (SDOH): HOW HARD IS IT FOR YOU TO PAY FOR THE VERY BASICS LIKE FOOD, HOUSING, MEDICAL CARE, AND HEATING?: NOT HARD AT ALL

## 2022-12-15 ASSESSMENT — LIFESTYLE VARIABLES
HOW MANY STANDARD DRINKS CONTAINING ALCOHOL DO YOU HAVE ON A TYPICAL DAY: PATIENT DOES NOT DRINK
HOW OFTEN DO YOU HAVE A DRINK CONTAINING ALCOHOL: NEVER

## 2022-12-15 NOTE — PROGRESS NOTES
Medicare Annual Wellness Visit    John Awad is here for Medicare AWV and Diabetes    Assessment & Plan   Encounter for general adult medical examination w/o abnormal findings  Type 2 diabetes mellitus without complication, without long-term current use of insulin (HCC)  -     POCT glycosylated hemoglobin (Hb A1C)  -     Accu-Chek Softclix Lancets MISC; Disp-100 each, R-0, NormalUSE   TO CHECK GLUCOSE TWICE DAILY  -     Comprehensive Metabolic Panel; Future  Anxiety  The following orders have not been finalized:  -     LORazepam (ATIVAN) 0.5 MG tablet  BLANE (acute kidney injury) (HCC)  -     furosemide (LASIX) 40 MG tablet; Take 1 tablet by mouth once daily, Disp-30 tablet, R-0Normal  Other fatigue  -     CBC; Future  -     TSH with Reflex; Future  Hyperlipidemia, unspecified hyperlipidemia type  -     Lipid Panel; Future  Muscle cramping  -     Magnesium; Future    Recommendations for Preventive Services Due: see orders and patient instructions/AVS.  Recommended screening schedule for the next 5-10 years is provided to the patient in written form: see Patient Instructions/AVS.     Return for Diabetes. Subjective   The following acute and/or chronic problems were also addressed today:  See below    Patient's complete Health Risk Assessment and screening values have been reviewed and are found in Flowsheets. The following problems were reviewed today and where indicated follow up appointments were made and/or referrals ordered.     Positive Risk Factor Screenings with Interventions:                 Weight and Activity:  Physical Activity: Inactive    Days of Exercise per Week: 0 days    Minutes of Exercise per Session: 0 min     On average, how many days per week do you engage in moderate to strenuous exercise (like a brisk walk)?: 0 days  Have you lost any weight without trying in the past 3 months?: No  Body mass index: (!) 30.68    Inactivity Interventions:  See above  Obesity Interventions:  Patient declines any further evaluation or treatment        Dentist Screen:  Have you seen the dentist within the past year?: (!) No    Intervention:  Referral placed to dentist  See AVS for additional education material  See A/P for any pertinent orders      Safety:  Do you always fasten your seatbelt when you are in a car?: (!) No  Interventions:  See above    ADL's:   Patient reports needing help with:  Select all that apply: (!) Laundry, Housekeeping, Banking/Finances, Shopping, Food Preparation, Transportation, Taking Medications  Interventions:  See above    Advanced Directives:  Do you have a Living Will?: (!) No    Intervention:  has NO advanced directive - not interested in additional information              Objective   Vitals:    12/15/22 1053   BP: 136/84   Pulse: (!) 110   Resp: 13   SpO2: 96%   Weight: 173 lb 3.2 oz (78.6 kg)   Height: 5' 3\" (1.6 m)      Body mass index is 30.68 kg/m².       General Appearance: alert and oriented to person, place and time, well developed and well- nourished, in no acute distress  Skin: warm and dry, no rash or erythema  Head: normocephalic and atraumatic  Eyes: pupils equal, round, and reactive to light, extraocular eye movements intact, conjunctivae normal  ENT: tympanic membrane, external ear and ear canal normal bilaterally, nose without deformity, nasal mucosa and turbinates normal without polyps  Neck: supple and non-tender without mass, no thyromegaly or thyroid nodules, no cervical lymphadenopathy  Pulmonary/Chest: clear to auscultation bilaterally- no wheezes, rales or rhonchi, normal air movement, no respiratory distress  Cardiovascular: normal rate, regular rhythm, normal S1 and S2, no murmurs, rubs, clicks, or gallops, distal pulses intact, no carotid bruits  Abdomen: soft, non-tender, non-distended, normal bowel sounds, no masses or organomegaly  Extremities: no cyanosis, clubbing or edema  Musculoskeletal: normal range of motion, no joint swelling, deformity or tenderness  Neurologic: reflexes normal and symmetric, no cranial nerve deficit, gait, coordination and speech normal       No Known Allergies  Prior to Visit Medications    Medication Sig Taking? Authorizing Provider   furosemide (LASIX) 40 MG tablet Take 1 tablet by mouth once daily Yes RANDY Mcclure CNP   Accu-Chek Softclix Lancets [facility] USE   TO CHECK GLUCOSE TWICE DAILY Yes RANDY Mcclure CNP   blood glucose monitor strips Accucheck Cortney plus testing strips. Test one times a day & as needed for symptoms of irregular blood glucose. Dispense sufficient amount for indicated testing frequency plus additional to accommodate PRN testing needs. Please fill with Accucheck testing strips.  Yes RANDY Mcclure CNP   Tirzepatide [facility]) 2.5 MG/0.5ML SOPN SC injection Inject 0.5 mLs into the skin once a week Yes RANDY Mcclure CNP   atorvastatin (LIPITOR) 20 MG tablet Take 1 tablet by mouth once daily Yes RANDY Mcclure CNP   Multiple Vitamins-Minerals (MULTI-VITAMIN GUMMIES PO) Take by mouth Yes Historical Provider, MD   ZINC PO Take by mouth Yes Historical Provider, MD   Semaglutide, 1 MG/DOSE, (OZEMPIC, 1 MG/DOSE,) 4 MG/3ML SOPN Inject 1 mg into the skin once a week Yes RANDY Mcclure CNP   rOPINIRole (REQUIP) 0.5 MG tablet Take 1 tablet by mouth 3 times daily as needed (restless  legs) Indications: Restless Leg Syndrome, usually just needs once a day Yes RANDY Mcclure CNP   empagliflozin (JARDIANCE) 10 MG tablet Take 10 mg by mouth daily Yes Historical Provider, MD   Handicap Placard [facility] by Other route Expires 2/2025 Yes RANDY Mcclure CNP   aspirin 81 MG chewable tablet Take 81 mg by mouth daily Yes Historical Provider, MD   acetaminophen (TYLENOL) 650 MG extended release tablet Take 1,300 mg by mouth 3 times daily  Yes Historical Provider, MD   Alcohol Swabs PADS Need to use  twice a day Yes Antoine Salamanca MD Blood Glucose Monitoring Suppl FLAVIA Glucometer Accu check. Test Blood Sugars 2 Times Daily.  Dx: E 11.22, N 18.3 type II DM Yes Marquita Baron MD       [facility] (Including outside providers/suppliers regularly involved in providing care):   Patient Care Team:  RANDY Hernadez CNP as PCP - General (Nurse Practitioner)  RANDY Hernadez CNP as PCP - [facility]  Janis Platt MD as Consulting Physician (Nephrology)  Amber Escobar DPM as Consulting Physician ([place]ianne Knee)  Camille Coronel MD as Consulting Physician (Rheumatology)  Lilibeth Knight DO as Consulting Physician (Pulmonology)  Yumiko Smiley DPM as Physician (Podiatry)     Reviewed and updated this visit:  Tobacco  Allergies  Meds  Problems  Med Hx  Surg Hx  Soc Hx  Fam Hx               Presents with spouse for AWV  BP well controlled  Weight is stable    Hga1c from 7 to 8.9  They are surprised as her sugars have been well controlled  Concerned as ozempic went from 42/month to 200/month  Will trial rx for mounjaro and update office with price    Using ativan prn anxiety/sleep BID  This works well for her  Denies any side effects with use of med    Willing to update annual labs    Declines mammogram/dexa and flu vaccine    Denies any other problems/concerns  Follow up in six [facility] for recheck or earlier if needed

## 2022-12-16 ENCOUNTER — TELEPHONE (OUTPATIENT)
Dept: PRIMARY CARE CLINIC | Age: 72
End: 2022-12-16

## 2022-12-16 NOTE — TELEPHONE ENCOUNTER
FYI Writer called to advise pt of results. Spoke to pts  Tish Union. Tish Clifford wanted to advise rian that the new medication sent in, Gudino Ponds is also going to be too expensive to afford. Tish Clifford is going to call insurance to see what medication options they have.

## 2022-12-21 DIAGNOSIS — E11.9 TYPE 2 DIABETES MELLITUS WITHOUT COMPLICATION, WITHOUT LONG-TERM CURRENT USE OF INSULIN (HCC): Primary | ICD-10-CM

## 2022-12-21 RX ORDER — GLUCOSAMINE HCL/CHONDROITIN SU 500-400 MG
CAPSULE ORAL
Qty: 100 STRIP | Refills: 5 | Status: SHIPPED | OUTPATIENT
Start: 2022-12-21

## 2022-12-21 RX ORDER — LANCETS
EACH MISCELLANEOUS
Qty: 100 EACH | Refills: 0 | Status: SHIPPED | OUTPATIENT
Start: 2022-12-21

## 2022-12-29 ENCOUNTER — TELEPHONE (OUTPATIENT)
Dept: PRIMARY CARE CLINIC | Age: 72
End: 2022-12-29

## 2023-01-18 DIAGNOSIS — Z12.31 VISIT FOR SCREENING MAMMOGRAM: Primary | ICD-10-CM

## 2023-07-05 DIAGNOSIS — F41.9 ANXIETY: ICD-10-CM

## 2023-07-05 RX ORDER — LORAZEPAM 0.5 MG/1
TABLET ORAL
Qty: 180 TABLET | Refills: 0 | OUTPATIENT
Start: 2023-07-05 | End: 2023-10-03

## 2023-07-05 RX ORDER — LORAZEPAM 0.5 MG/1
TABLET ORAL
Qty: 180 TABLET | Refills: 0 | Status: SHIPPED | OUTPATIENT
Start: 2023-07-05 | End: 2023-10-03

## 2023-09-08 ENCOUNTER — OFFICE VISIT (OUTPATIENT)
Dept: PRIMARY CARE CLINIC | Age: 73
End: 2023-09-08
Payer: MEDICARE

## 2023-09-08 VITALS
RESPIRATION RATE: 13 BRPM | OXYGEN SATURATION: 95 % | HEART RATE: 92 BPM | SYSTOLIC BLOOD PRESSURE: 136 MMHG | HEIGHT: 63 IN | BODY MASS INDEX: 31.61 KG/M2 | DIASTOLIC BLOOD PRESSURE: 84 MMHG | WEIGHT: 178.4 LBS

## 2023-09-08 DIAGNOSIS — E11.9 TYPE 2 DIABETES MELLITUS WITHOUT COMPLICATION, WITHOUT LONG-TERM CURRENT USE OF INSULIN (HCC): Primary | ICD-10-CM

## 2023-09-08 DIAGNOSIS — I26.02 SADDLE EMBOLUS OF PULMONARY ARTERY WITH ACUTE COR PULMONALE, UNSPECIFIED CHRONICITY (HCC): ICD-10-CM

## 2023-09-08 DIAGNOSIS — D68.9 COAGULOPATHY (HCC): ICD-10-CM

## 2023-09-08 DIAGNOSIS — N89.8 VAGINAL DISCHARGE: ICD-10-CM

## 2023-09-08 DIAGNOSIS — E11.65 TYPE 2 DIABETES MELLITUS WITH HYPERGLYCEMIA, WITHOUT LONG-TERM CURRENT USE OF INSULIN (HCC): ICD-10-CM

## 2023-09-08 DIAGNOSIS — R05.9 COUGH, UNSPECIFIED TYPE: ICD-10-CM

## 2023-09-08 DIAGNOSIS — N17.9 AKI (ACUTE KIDNEY INJURY) (HCC): ICD-10-CM

## 2023-09-08 LAB — HBA1C MFR BLD: 8.6 %

## 2023-09-08 PROCEDURE — G8400 PT W/DXA NO RESULTS DOC: HCPCS | Performed by: NURSE PRACTITIONER

## 2023-09-08 PROCEDURE — G8427 DOCREV CUR MEDS BY ELIG CLIN: HCPCS | Performed by: NURSE PRACTITIONER

## 2023-09-08 PROCEDURE — 3079F DIAST BP 80-89 MM HG: CPT | Performed by: NURSE PRACTITIONER

## 2023-09-08 PROCEDURE — 1036F TOBACCO NON-USER: CPT | Performed by: NURSE PRACTITIONER

## 2023-09-08 PROCEDURE — 2022F DILAT RTA XM EVC RTNOPTHY: CPT | Performed by: NURSE PRACTITIONER

## 2023-09-08 PROCEDURE — 3017F COLORECTAL CA SCREEN DOC REV: CPT | Performed by: NURSE PRACTITIONER

## 2023-09-08 PROCEDURE — 1090F PRES/ABSN URINE INCON ASSESS: CPT | Performed by: NURSE PRACTITIONER

## 2023-09-08 PROCEDURE — 99214 OFFICE O/P EST MOD 30 MIN: CPT | Performed by: NURSE PRACTITIONER

## 2023-09-08 PROCEDURE — G8417 CALC BMI ABV UP PARAM F/U: HCPCS | Performed by: NURSE PRACTITIONER

## 2023-09-08 PROCEDURE — 3052F HG A1C>EQUAL 8.0%<EQUAL 9.0%: CPT | Performed by: NURSE PRACTITIONER

## 2023-09-08 PROCEDURE — 3075F SYST BP GE 130 - 139MM HG: CPT | Performed by: NURSE PRACTITIONER

## 2023-09-08 PROCEDURE — 1123F ACP DISCUSS/DSCN MKR DOCD: CPT | Performed by: NURSE PRACTITIONER

## 2023-09-08 PROCEDURE — 83036 HEMOGLOBIN GLYCOSYLATED A1C: CPT | Performed by: NURSE PRACTITIONER

## 2023-09-08 RX ORDER — ONDANSETRON 4 MG/1
4 TABLET, FILM COATED ORAL EVERY 8 HOURS PRN
Qty: 60 TABLET | Refills: 3 | Status: SHIPPED | OUTPATIENT
Start: 2023-09-08

## 2023-09-08 RX ORDER — FUROSEMIDE 40 MG/1
TABLET ORAL
Qty: 30 TABLET | Refills: 0 | Status: SHIPPED | OUTPATIENT
Start: 2023-09-08

## 2023-09-08 RX ORDER — FLUCONAZOLE 150 MG/1
150 TABLET ORAL ONCE
Qty: 1 TABLET | Refills: 1 | Status: SHIPPED | OUTPATIENT
Start: 2023-09-08 | End: 2023-09-08

## 2023-09-08 RX ORDER — SEMAGLUTIDE 1.34 MG/ML
1 INJECTION, SOLUTION SUBCUTANEOUS WEEKLY
Qty: 3 ML | Refills: 5 | Status: CANCELLED | OUTPATIENT
Start: 2023-09-08

## 2023-09-08 RX ORDER — BENZONATATE 200 MG/1
200 CAPSULE ORAL 3 TIMES DAILY PRN
Qty: 90 CAPSULE | Refills: 3 | Status: SHIPPED | OUTPATIENT
Start: 2023-09-08 | End: 2024-01-06

## 2023-09-08 RX ORDER — SEMAGLUTIDE 1.34 MG/ML
0.5 INJECTION, SOLUTION SUBCUTANEOUS WEEKLY
Qty: 3 ML | Refills: 3 | Status: SHIPPED | OUTPATIENT
Start: 2023-09-08

## 2023-09-08 SDOH — ECONOMIC STABILITY: HOUSING INSECURITY
IN THE LAST 12 MONTHS, WAS THERE A TIME WHEN YOU DID NOT HAVE A STEADY PLACE TO SLEEP OR SLEPT IN A SHELTER (INCLUDING NOW)?: NO

## 2023-09-08 SDOH — ECONOMIC STABILITY: INCOME INSECURITY: HOW HARD IS IT FOR YOU TO PAY FOR THE VERY BASICS LIKE FOOD, HOUSING, MEDICAL CARE, AND HEATING?: NOT HARD AT ALL

## 2023-09-08 SDOH — ECONOMIC STABILITY: FOOD INSECURITY: WITHIN THE PAST 12 MONTHS, YOU WORRIED THAT YOUR FOOD WOULD RUN OUT BEFORE YOU GOT MONEY TO BUY MORE.: NEVER TRUE

## 2023-09-08 SDOH — ECONOMIC STABILITY: FOOD INSECURITY: WITHIN THE PAST 12 MONTHS, THE FOOD YOU BOUGHT JUST DIDN'T LAST AND YOU DIDN'T HAVE MONEY TO GET MORE.: NEVER TRUE

## 2023-09-08 ASSESSMENT — PATIENT HEALTH QUESTIONNAIRE - PHQ9
SUM OF ALL RESPONSES TO PHQ QUESTIONS 1-9: 0
2. FEELING DOWN, DEPRESSED OR HOPELESS: 0
1. LITTLE INTEREST OR PLEASURE IN DOING THINGS: 0
SUM OF ALL RESPONSES TO PHQ QUESTIONS 1-9: 0
SUM OF ALL RESPONSES TO PHQ QUESTIONS 1-9: 0
SUM OF ALL RESPONSES TO PHQ9 QUESTIONS 1 & 2: 0
SUM OF ALL RESPONSES TO PHQ QUESTIONS 1-9: 0

## 2023-09-08 ASSESSMENT — ENCOUNTER SYMPTOMS
ABDOMINAL PAIN: 0
BACK PAIN: 0
COUGH: 1
SHORTNESS OF BREATH: 0

## 2023-09-08 NOTE — PROGRESS NOTES
[address]  PRIMARY CARE  [address]  [facility] [address]  Dept: [phone]  Dept Fax: [phone]    Tabatha Burgess is a 67 y.o. female who presentstoday for her medical conditions/complaints as noted below. Tabatha Burgess is c/o of  Chief Complaint   Patient presents with    Diabetes    Cough     X 2 months tries OTC Flonase ,Amoxicillin 500 mg didn't work, OTC cough syrup calms it down .  Benzonatate 100 mg does help THC gummies 10 mg          HPI:     Presents with  for recheck on chronic conditions  BP well controlled  Has gained 7lb since LOV 6/2022  Using cane in office today    C/o cough  Worse at night, in early am  This wipes her out and makes her feel tired  No improvement with use of flonase   Tessalon perles do help- will send rx    Hga1c from 7 to 8.6  Has not used ozempic x 2 weeks  Hard to tolerate d/t nausea  Will reduce dose and use zofran    Using [facility] for bilateral lower extremity pain  Feels it helps her to relax  Continues ativan for anxiety prn    C/o yeast infection- will send diflucan    Denies any other problems/concerns      Hemoglobin A1C (%)   Date Value   09/08/2023 8.6   06/15/2022 7.0   03/07/2022 10.2             ( goal A1C is < 7)   No components found for: LABMICR  LDL Cholesterol (mg/dL)   Date Value   12/15/2022        03/11/2019 77     LDL Calculated (mg/dL)   Date Value   11/13/2017 74       (goal LDL is <100)   AST (U/L)   Date Value   12/15/2022 24     ALT (U/L)   Date Value   12/15/2022 27     BUN (mg/dL)   Date Value   12/15/2022 15     BP Readings from Last 3 Encounters:   09/08/23 136/84   12/15/22 136/84   06/15/22 124/80          ([id number])    Past Medical History:   Diagnosis Date    Acute respiratory failure (HCC)     Anemia     Blood transfusion    Anxiety     Chronic kidney disease     GERD (gastroesophageal reflux disease)     H/O transfusion 02/2018    Anemia    Hiatal hernia     Hyperlipidemia

## 2023-10-06 DIAGNOSIS — F41.9 ANXIETY: ICD-10-CM

## 2023-10-06 RX ORDER — LORAZEPAM 0.5 MG/1
TABLET ORAL
Qty: 180 TABLET | Refills: 0 | Status: SHIPPED | OUTPATIENT
Start: 2023-10-06 | End: 2024-01-04

## 2023-12-19 RX ORDER — ATORVASTATIN CALCIUM 20 MG/1
20 TABLET, FILM COATED ORAL DAILY
Qty: 90 TABLET | Refills: 3 | Status: SHIPPED | OUTPATIENT
Start: 2023-12-19

## 2024-01-05 DIAGNOSIS — F41.9 ANXIETY: ICD-10-CM

## 2024-01-05 RX ORDER — LORAZEPAM 0.5 MG/1
TABLET ORAL
Qty: 180 TABLET | Refills: 0 | Status: SHIPPED | OUTPATIENT
Start: 2024-01-05 | End: 2024-02-05

## 2024-01-05 NOTE — TELEPHONE ENCOUNTER
Last Visit Date: 12/18/2023   Next Visit Date: 6/19/2024     Pt would like something other than ozempic.    Ozempic is $700 and Pt can not afford that.

## 2024-02-26 RX ORDER — SEMAGLUTIDE 0.68 MG/ML
INJECTION, SOLUTION SUBCUTANEOUS
Qty: 3 ML | Refills: 0 | Status: SHIPPED | OUTPATIENT
Start: 2024-02-26

## 2024-03-28 RX ORDER — FLUCONAZOLE 150 MG/1
150 TABLET ORAL ONCE
Qty: 1 TABLET | Refills: 1 | Status: SHIPPED | OUTPATIENT
Start: 2024-03-28 | End: 2024-03-28

## 2024-04-10 DIAGNOSIS — F41.9 ANXIETY: ICD-10-CM

## 2024-04-10 RX ORDER — LORAZEPAM 0.5 MG/1
TABLET ORAL
Qty: 180 TABLET | Refills: 0 | Status: SHIPPED | OUTPATIENT
Start: 2024-04-10 | End: 2024-05-11

## 2024-06-05 DIAGNOSIS — N17.9 AKI (ACUTE KIDNEY INJURY) (HCC): ICD-10-CM

## 2024-06-05 RX ORDER — FUROSEMIDE 40 MG/1
TABLET ORAL
Qty: 30 TABLET | Refills: 0 | Status: SHIPPED | OUTPATIENT
Start: 2024-06-05

## 2024-07-10 DIAGNOSIS — F41.9 ANXIETY: ICD-10-CM

## 2024-07-10 RX ORDER — LORAZEPAM 0.5 MG/1
TABLET ORAL
Qty: 180 TABLET | Refills: 0 | OUTPATIENT
Start: 2024-07-10

## 2024-07-22 ENCOUNTER — OFFICE VISIT (OUTPATIENT)
Dept: PRIMARY CARE CLINIC | Age: 74
End: 2024-07-22
Payer: MEDICARE

## 2024-07-22 ENCOUNTER — HOSPITAL ENCOUNTER (OUTPATIENT)
Age: 74
Setting detail: SPECIMEN
Discharge: HOME | End: 2024-07-22

## 2024-07-22 VITALS
DIASTOLIC BLOOD PRESSURE: 84 MMHG | HEIGHT: 63 IN | WEIGHT: 171.6 LBS | SYSTOLIC BLOOD PRESSURE: 122 MMHG | RESPIRATION RATE: 15 BRPM | HEART RATE: 90 BPM | OXYGEN SATURATION: 95 % | BODY MASS INDEX: 30.41 KG/M2

## 2024-07-22 DIAGNOSIS — E11.65 TYPE 2 DIABETES MELLITUS WITH HYPERGLYCEMIA, WITHOUT LONG-TERM CURRENT USE OF INSULIN (HCC): ICD-10-CM

## 2024-07-22 DIAGNOSIS — I46.2 CARDIAC ARREST DUE TO UNDERLYING CARDIAC CONDITION (HCC): ICD-10-CM

## 2024-07-22 DIAGNOSIS — E11.9 TYPE 2 DIABETES MELLITUS WITHOUT COMPLICATION, WITHOUT LONG-TERM CURRENT USE OF INSULIN (HCC): Primary | ICD-10-CM

## 2024-07-22 DIAGNOSIS — D68.9 COAGULOPATHY (HCC): ICD-10-CM

## 2024-07-22 DIAGNOSIS — I26.02 SADDLE EMBOLUS OF PULMONARY ARTERY WITH ACUTE COR PULMONALE, UNSPECIFIED CHRONICITY (HCC): ICD-10-CM

## 2024-07-22 DIAGNOSIS — F41.9 ANXIETY: ICD-10-CM

## 2024-07-22 LAB
ALBUMIN SERPL-MCNC: 4.4 G/DL (ref 3.5–5.2)
ALBUMIN/GLOB SERPL: 2 {RATIO} (ref 1–2.5)
ALP SERPL-CCNC: 81 U/L (ref 35–104)
ALT SERPL-CCNC: 30 U/L (ref 10–35)
ANION GAP SERPL CALCULATED.3IONS-SCNC: 13 MMOL/L (ref 9–16)
AST SERPL-CCNC: 20 U/L (ref 10–35)
BILIRUB SERPL-MCNC: 0.5 MG/DL (ref 0–1.2)
BUN SERPL-MCNC: 12 MG/DL (ref 8–23)
CALCIUM SERPL-MCNC: 9.8 MG/DL (ref 8.6–10.4)
CHLORIDE SERPL-SCNC: 101 MMOL/L (ref 98–107)
CO2 SERPL-SCNC: 26 MMOL/L (ref 20–31)
CREAT SERPL-MCNC: 0.9 MG/DL (ref 0.5–0.9)
GFR, ESTIMATED: 67 ML/MIN/1.73M2
GLUCOSE SERPL-MCNC: 217 MG/DL (ref 74–99)
HBA1C MFR BLD: 8.8 %
POTASSIUM SERPL-SCNC: 4.3 MMOL/L (ref 3.7–5.3)
PROT SERPL-MCNC: 7.3 G/DL (ref 6.6–8.7)
SODIUM SERPL-SCNC: 140 MMOL/L (ref 136–145)

## 2024-07-22 PROCEDURE — G8400 PT W/DXA NO RESULTS DOC: HCPCS | Performed by: NURSE PRACTITIONER

## 2024-07-22 PROCEDURE — 99214 OFFICE O/P EST MOD 30 MIN: CPT | Performed by: NURSE PRACTITIONER

## 2024-07-22 PROCEDURE — 2022F DILAT RTA XM EVC RTNOPTHY: CPT | Performed by: NURSE PRACTITIONER

## 2024-07-22 PROCEDURE — 3017F COLORECTAL CA SCREEN DOC REV: CPT | Performed by: NURSE PRACTITIONER

## 2024-07-22 PROCEDURE — 83036 HEMOGLOBIN GLYCOSYLATED A1C: CPT | Performed by: NURSE PRACTITIONER

## 2024-07-22 PROCEDURE — G2211 COMPLEX E/M VISIT ADD ON: HCPCS | Performed by: NURSE PRACTITIONER

## 2024-07-22 PROCEDURE — 1090F PRES/ABSN URINE INCON ASSESS: CPT | Performed by: NURSE PRACTITIONER

## 2024-07-22 PROCEDURE — 1036F TOBACCO NON-USER: CPT | Performed by: NURSE PRACTITIONER

## 2024-07-22 PROCEDURE — 1123F ACP DISCUSS/DSCN MKR DOCD: CPT | Performed by: NURSE PRACTITIONER

## 2024-07-22 PROCEDURE — G8417 CALC BMI ABV UP PARAM F/U: HCPCS | Performed by: NURSE PRACTITIONER

## 2024-07-22 PROCEDURE — 3074F SYST BP LT 130 MM HG: CPT | Performed by: NURSE PRACTITIONER

## 2024-07-22 PROCEDURE — G8427 DOCREV CUR MEDS BY ELIG CLIN: HCPCS | Performed by: NURSE PRACTITIONER

## 2024-07-22 PROCEDURE — 3052F HG A1C>EQUAL 8.0%<EQUAL 9.0%: CPT | Performed by: NURSE PRACTITIONER

## 2024-07-22 PROCEDURE — 3079F DIAST BP 80-89 MM HG: CPT | Performed by: NURSE PRACTITIONER

## 2024-07-22 RX ORDER — BUMETANIDE 0.5 MG/1
0.5 TABLET ORAL DAILY PRN
Qty: 30 TABLET | Refills: 0 | Status: SHIPPED | OUTPATIENT
Start: 2024-07-22 | End: 2024-07-23 | Stop reason: SDUPTHER

## 2024-07-22 RX ORDER — LORAZEPAM 0.5 MG/1
0.5 TABLET ORAL 2 TIMES DAILY
Qty: 180 TABLET | Refills: 1 | Status: SHIPPED | OUTPATIENT
Start: 2024-07-22 | End: 2025-01-18

## 2024-07-22 RX ORDER — LORAZEPAM 0.5 MG/1
0.5 TABLET ORAL 2 TIMES DAILY
COMMUNITY
Start: 2024-07-17 | End: 2024-07-22 | Stop reason: SDUPTHER

## 2024-07-22 RX ORDER — ALBUTEROL SULFATE 90 UG/1
2 AEROSOL, METERED RESPIRATORY (INHALATION) EVERY 6 HOURS PRN
COMMUNITY
Start: 2024-07-17

## 2024-07-22 ASSESSMENT — ENCOUNTER SYMPTOMS
SHORTNESS OF BREATH: 1
ABDOMINAL PAIN: 0
COUGH: 0
BACK PAIN: 0

## 2024-07-22 NOTE — PROGRESS NOTES
current use of insulin (HCC)  POCT glycosylated hemoglobin (Hb A1C)      2. Cardiac arrest due to underlying cardiac condition (HCC)        3. 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 Type 2 diabetes mellitus with hyperglycemia, without long-term current use of insulin (HCC)        5. Coagulopathy (HCC)        6. Anxiety                  Plan:   Assessment & Plan   Return in about 6 months (around 1/22/2025) for annual exam.    Chronic conditions- Stable. Continue diet/exercise. Continue current meds. Update CMP. Follow up in six months for AWV/earlier if needed.    Orders Placed This Encounter   Procedures    POCT glycosylated hemoglobin (Hb A1C)      No orders of the defined types were placed in this encounter.      Patient given educational materials - see patient instructions.Discussed use, benefit, and side effects of prescribed medications.  All patientquestions answered. Pt voiced understanding. Reviewed health maintenance.  Instructedto continue current medications, diet and exercise.  Patient agreed with treatmentplan. Follow up as directed.     Electronicallysigned by RANDY SINGH CNP on 7/22/2024 at 9:47 AM

## 2024-07-23 RX ORDER — BUMETANIDE 0.5 MG/1
0.5 TABLET ORAL DAILY PRN
Qty: 30 TABLET | Refills: 0 | Status: SHIPPED | OUTPATIENT
Start: 2024-07-23

## 2024-07-23 NOTE — TELEPHONE ENCOUNTER
Last Visit Date: 7/22/2024   Next Visit Date: 1/22/2025   Pharmacy:   [id number] - [place], [place] - [address] - P [phone] - F [phone]  [facility] [facility] [address]  Phone: [phone] Fax: [phone]      Pt needs sent to [facility] in [facility].    Medication pend, pharmacy updated

## 2024-11-25 ENCOUNTER — TELEPHONE (OUTPATIENT)
Dept: PRIMARY CARE CLINIC | Age: 74
End: 2024-11-25

## 2024-11-25 NOTE — TELEPHONE ENCOUNTER
Patient  called stating he would like her appointment pushed up to December.     Writer informed caller that PCP does not have any open appointments in December and her first available is 1/3/25.    Caller stated that is no help to him because she already has an appointment 1/22/24 and that he will need a message sent to PCP to have pt squeezed in sooner since they moved back to [place].

## (undated) DEVICE — PENROSE DRAIN 18 X .5" SILICONE: Brand: MEDLINE

## (undated) DEVICE — GARMENT,MEDLINE,DVT,INT,CALF,MED, GEN2: Brand: MEDLINE

## (undated) DEVICE — GOWN,AURORA,NONREINFORCED,LARGE: Brand: MEDLINE

## (undated) DEVICE — ARM DRAPE

## (undated) DEVICE — SUTURE PERMAHAND SZ 0 L30IN NONABSORBABLE BLK L26MM SH 1/2 K834H

## (undated) DEVICE — TOWEL,OR,DSP,ST,NATURAL,DLX,4/PK,20PK/CS: Brand: MEDLINE

## (undated) DEVICE — SUTURE MCRYL + SZ 4 0 L18IN ABSRB UD PC 3 L16MM 3 8 CIR PRIM MCP845G

## (undated) DEVICE — TROCAR: Brand: KII FIOS FIRST ENTRY

## (undated) DEVICE — PROTECTOR ULN NRV PUR FOAM HK LOOP STRP ANATOMICALLY

## (undated) DEVICE — BLADELESS OBTURATOR: Brand: WECK VISTA

## (undated) DEVICE — BINDER ABD UNIV H9IN WAIST 45-62IN E SFT COT PREM 3 PNL

## (undated) DEVICE — INSUFFLATION TUBING SET WITH FILTER, FUNNEL CONNECTOR AND LUER LOCK: Brand: JOSNOE MEDICAL INC

## (undated) DEVICE — SOLUTION ANTIFOG VIS SYS CLEARIFY LAPSCP

## (undated) DEVICE — KENDALL SCD EXPRESS SLEEVES, KNEE LENGTH, MEDIUM: Brand: KENDALL SCD

## (undated) DEVICE — Device

## (undated) DEVICE — VESSEL SEALER: Brand: ENDOWRIST

## (undated) DEVICE — CANNULA SEAL

## (undated) DEVICE — GLOVE SURG SZ 65 THK91MIL LTX FREE SYN POLYISOPRENE

## (undated) DEVICE — GLOVE ORANGE PI 7   MSG9070

## (undated) DEVICE — COVER,LIGHT HANDLE,FLX,2/PK: Brand: MEDLINE INDUSTRIES, INC.

## (undated) DEVICE — CHLORAPREP 26ML ORANGE

## (undated) DEVICE — SKIN AFFIX SURG ADHESIVE 72/CS 0.55ML: Brand: MEDLINE

## (undated) DEVICE — SUTURE ETHBND EXCEL SZ 0 L30IN NONABSORBABLE GRN L26MM CT-2 X412H